# Patient Record
Sex: MALE | Race: BLACK OR AFRICAN AMERICAN | NOT HISPANIC OR LATINO | Employment: FULL TIME | URBAN - METROPOLITAN AREA
[De-identification: names, ages, dates, MRNs, and addresses within clinical notes are randomized per-mention and may not be internally consistent; named-entity substitution may affect disease eponyms.]

---

## 2017-01-18 ENCOUNTER — TELEPHONE (OUTPATIENT)
Dept: SMOKING CESSATION | Facility: CLINIC | Age: 42
End: 2017-01-18

## 2017-01-18 NOTE — TELEPHONE ENCOUNTER
First attempt regarding smoking cessation quit 1 episode, unable to leave message due to no answering service available.

## 2017-01-25 ENCOUNTER — TELEPHONE (OUTPATIENT)
Dept: SMOKING CESSATION | Facility: CLINIC | Age: 42
End: 2017-01-25

## 2017-01-25 ENCOUNTER — CLINICAL SUPPORT (OUTPATIENT)
Dept: SMOKING CESSATION | Facility: CLINIC | Age: 42
End: 2017-01-25
Payer: COMMERCIAL

## 2017-01-25 DIAGNOSIS — F17.200 NICOTINE DEPENDENCE: Primary | ICD-10-CM

## 2017-01-25 PROCEDURE — 99407 BEHAV CHNG SMOKING > 10 MIN: CPT | Mod: S$GLB,,,

## 2017-01-25 NOTE — TELEPHONE ENCOUNTER
First attempt regarding smoking cessation quit 1 episode, pt stated he was busy and couldn't talk. He requested for me to call him back in a few hours, will call back at a later time.

## 2017-01-27 ENCOUNTER — CLINICAL SUPPORT (OUTPATIENT)
Dept: SMOKING CESSATION | Facility: CLINIC | Age: 42
End: 2017-01-27
Payer: COMMERCIAL

## 2017-01-27 DIAGNOSIS — F17.200 NICOTINE DEPENDENCE: Primary | ICD-10-CM

## 2017-01-27 PROCEDURE — 99404 PREV MED CNSL INDIV APPRX 60: CPT | Mod: S$GLB,,,

## 2017-01-27 PROCEDURE — 99999 PR PBB SHADOW E&M-EST. PATIENT-LVL I: CPT | Mod: PBBFAC,,,

## 2017-01-27 RX ORDER — NICOTINE 7MG/24HR
1 PATCH, TRANSDERMAL 24 HOURS TRANSDERMAL DAILY
Qty: 14 PATCH | Refills: 0 | Status: SHIPPED | OUTPATIENT
Start: 2017-01-27 | End: 2018-03-08

## 2017-01-27 RX ORDER — VARENICLINE TARTRATE 0.5 (11)-1
KIT ORAL
Qty: 1 PACKAGE | Refills: 0 | Status: SHIPPED | OUTPATIENT
Start: 2017-01-27 | End: 2018-03-08

## 2017-01-27 NOTE — PROGRESS NOTES
Pt was seen today for smoking cessation quit #2. Pt is smoking about 4-5 cigs/day and is using a e-cigarette with 1.8 % nicotine 2-3 times per day. Pt is confident and ready to get quit. He was able to quit last year when he went through program for 4 months, but relapsed because of stress. Patient will be participating in biweekly tobacco cessation meetings and will begin the prescribed tobacco cessation medication regimen of the Chantix starter pack and 7 mg nicotine patches QAM. He used both prior and didn't experience any negative side effects. Pt asked to cut back to 2-3 cigs/day and try to eliminate using e-cig. Pt is to start  with rate reduction and wait time of 15 min prior to smoking. Pt is to follow up in group session on 1/31/17 with Fritz Vines.

## 2017-02-16 ENCOUNTER — CLINICAL SUPPORT (OUTPATIENT)
Dept: SMOKING CESSATION | Facility: CLINIC | Age: 42
End: 2017-02-16
Payer: COMMERCIAL

## 2017-02-16 DIAGNOSIS — F17.200 NICOTINE DEPENDENCE: Primary | ICD-10-CM

## 2017-02-16 PROCEDURE — 99407 BEHAV CHNG SMOKING > 10 MIN: CPT | Mod: S$GLB,,,

## 2017-03-07 ENCOUNTER — OFFICE VISIT (OUTPATIENT)
Dept: FAMILY MEDICINE | Facility: CLINIC | Age: 42
End: 2017-03-07
Payer: COMMERCIAL

## 2017-03-07 VITALS
BODY MASS INDEX: 41.75 KG/M2 | HEART RATE: 94 BPM | WEIGHT: 315 LBS | OXYGEN SATURATION: 96 % | HEIGHT: 73 IN | DIASTOLIC BLOOD PRESSURE: 84 MMHG | SYSTOLIC BLOOD PRESSURE: 122 MMHG

## 2017-03-07 DIAGNOSIS — G89.4 CHRONIC PAIN SYNDROME: ICD-10-CM

## 2017-03-07 DIAGNOSIS — Z12.5 SCREENING FOR PROSTATE CANCER: ICD-10-CM

## 2017-03-07 DIAGNOSIS — I10 ESSENTIAL HYPERTENSION: ICD-10-CM

## 2017-03-07 DIAGNOSIS — E66.01 SEVERE OBESITY (BMI >= 40): ICD-10-CM

## 2017-03-07 DIAGNOSIS — Z00.00 ANNUAL PHYSICAL EXAM: Primary | ICD-10-CM

## 2017-03-07 PROCEDURE — 3074F SYST BP LT 130 MM HG: CPT | Mod: S$GLB,,, | Performed by: FAMILY MEDICINE

## 2017-03-07 PROCEDURE — 99396 PREV VISIT EST AGE 40-64: CPT | Mod: S$GLB,,, | Performed by: FAMILY MEDICINE

## 2017-03-07 PROCEDURE — 3079F DIAST BP 80-89 MM HG: CPT | Mod: S$GLB,,, | Performed by: FAMILY MEDICINE

## 2017-03-07 PROCEDURE — 99999 PR PBB SHADOW E&M-EST. PATIENT-LVL III: CPT | Mod: PBBFAC,,, | Performed by: FAMILY MEDICINE

## 2017-03-07 NOTE — PROGRESS NOTES
Subjective:       Patient ID: Shane Savage is a 41 y.o. male.    Chief Complaint: Annual Exam    HPI Comments: 41 years old male who came to the clinic for his physical examination.  Blood pressure today is stable.  No chest pain palpitations or PND.  Patient is obese with a BMI of 44 currently trying to lose weight.  Patient with chronic back pain requesting a second opinion with other pain management group.    Review of Systems   Constitutional: Negative.    HENT: Negative.    Eyes: Negative.    Respiratory: Negative.    Cardiovascular: Negative.  Negative for chest pain, palpitations and leg swelling.   Gastrointestinal: Negative.    Genitourinary: Negative.    Musculoskeletal: Positive for arthralgias.   Skin: Negative.    Neurological: Negative.    Psychiatric/Behavioral: Negative.        Objective:      Physical Exam   Constitutional: He is oriented to person, place, and time. He appears well-developed and well-nourished. No distress.   HENT:   Head: Normocephalic and atraumatic.   Right Ear: External ear normal.   Left Ear: External ear normal.   Nose: Nose normal.   Mouth/Throat: Oropharynx is clear and moist. No oropharyngeal exudate.   Eyes: Conjunctivae and EOM are normal. Pupils are equal, round, and reactive to light. Right eye exhibits no discharge. Left eye exhibits no discharge. No scleral icterus.   Neck: Normal range of motion. Neck supple. No JVD present. No tracheal deviation present. No thyromegaly present.   Cardiovascular: Normal rate, regular rhythm, normal heart sounds and intact distal pulses.  Exam reveals no gallop and no friction rub.    No murmur heard.  Pulmonary/Chest: Effort normal and breath sounds normal. No stridor. No respiratory distress. He has no wheezes. He has no rales. He exhibits no tenderness.   Abdominal: Soft. Bowel sounds are normal. He exhibits no distension and no mass. There is no tenderness. There is no rebound and no guarding.   Musculoskeletal: Normal range of  motion. He exhibits no edema or tenderness.   Lymphadenopathy:     He has no cervical adenopathy.   Neurological: He is alert and oriented to person, place, and time. He has normal reflexes. He displays normal reflexes. No cranial nerve deficit. He exhibits normal muscle tone. Coordination normal.   Skin: Skin is warm and dry. No rash noted. He is not diaphoretic. No erythema. No pallor.   Psychiatric: He has a normal mood and affect. His behavior is normal. Judgment and thought content normal.   Nursing note and vitals reviewed.      Assessment:       1. Annual physical exam    2. Essential hypertension    3. Severe obesity (BMI >= 40)    4. Chronic pain syndrome    5. Screening for prostate cancer        Plan:         Shane was seen today for annual exam.    Diagnoses and all orders for this visit:    Annual physical exam  -     Comprehensive metabolic panel; Future  -     Lipid panel; Future  -     Urinalysis; Future  -     TSH; Future  -     CBC auto differential; Future    Essential hypertension  -     Comprehensive metabolic panel; Future  -     Lipid panel; Future  -     Urinalysis; Future  -     TSH; Future  -     CBC auto differential; Future    Severe obesity (BMI >= 40)  -     Comprehensive metabolic panel; Future  -     Lipid panel; Future    Chronic pain syndrome  -     Ambulatory referral to Pain Clinic    Screening for prostate cancer  -     PSA, Screening; Future    Continue monitoring blood pressure at home, low sodium diet.

## 2017-03-07 NOTE — MR AVS SNAPSHOT
[facility]   [place]  [place] [address]  Phone: [phone]  Fax: [phone]                  Shane Savage   3/7/2017 1:40 PM   Office Visit    Description:  Male : 1975   Provider:  Nuno White MD   Department:  [facility]           Reason for Visit     Annual Exam           Diagnoses this Visit        Comments    Annual physical exam    -  Primary     Essential hypertension         Severe obesity (BMI >= 40)         Chronic pain syndrome         Screening for prostate cancer                To Do List           Future Appointments        Provider Department Dept Phone    3/14/2017 7:00 AM [facility] [phone]    3/14/2017 7:15 AM [facility] [phone]      Goals (5 Years of Data)     None      Follow-Up and Disposition     Return in about 6 months (around 2017), or if symptoms worsen or fail to improve.    Follow-up and Disposition History      [facility]     [facility] Care [facility] -  Assistance  Registered nurses in the [facility] provide clinical advisement, health education, appointment booking, and other advisory services.  Call for this free service at [phone].             Medications           Message regarding Medications     Verify the changes and/or additions to your medication regime listed below are the same as discussed with your clinician today.  If any of these changes or additions are incorrect, please notify your healthcare provider.        STOP taking these medications     acyclovir (ZOVIRAX) 400 MG tablet TK ONE T PO FIVE TIMES DAILY FOR 7 DAYS    CYCLOBENZAPRINE HCL (FLEXERIL ORAL) Take by mouth every evening.    naproxen (NAPROSYN) 500 MG tablet Take 1 tablet (500 mg total) by mouth every 12 (twelve) hours as needed (pain). Take directly after meals.    valacyclovir (VALTREX) 1000 MG tablet Take 1 tablet (1,000 mg  "total) by mouth 3 (three) times daily.           Verify that the below list of medications is an accurate representation of the medications you are currently taking.  If none reported, the list may be blank. If incorrect, please contact your healthcare provider. Carry this list with you in case of emergency.           Current Medications     hydrochlorothiazide (HYDRODIURIL) 12.5 MG Tab TAKE ONE TABLET BY MOUTH ONCE DAILY    hydrocodone-acetaminophen 7.5-325mg (NORCO) 7.5-325 mg per tablet Take 1 tablet by mouth every 6 (six) hours as needed for Pain.    nicotine (NICODERM CQ) 7 mg/24 hr Place 1 patch onto the skin once daily.    CHANTIX STARTING MONTH BOX 0.5 mg (11)- 1 mg (42) tablet Take one 0.5mg tab by mouth once daily X3 days,then increase to one 0.5mg tab twice daily X4 days,then increase to one 1mg tab twice daily           Clinical Reference Information           Your Vitals Were     BP Pulse Height Weight SpO2 BMI    122/84 (BP Location: Left arm, Patient Position: Sitting, BP Method: Manual) 94 6' 1" (1.854 m) 152.8 kg (336 lb 13.8 oz) 96% 44.44 kg/m2      Blood Pressure          Most Recent Value    BP  122/84      Allergies as of 3/7/2017     Shellfish Containing Products      Immunizations Administered on Date of Encounter - 3/7/2017     None      Orders Placed During Today's Visit      Normal Orders This Visit    Ambulatory referral to Pain Clinic     Future Labs/Procedures Expected by Expires    CBC auto differential  3/7/2017 3/7/2018    Comprehensive metabolic panel  3/7/2017 6/5/2017    Lipid panel  3/7/2017 6/5/2017    PSA, Screening  3/7/2017 6/5/2017    TSH  3/7/2017 6/5/2017    Urinalysis  3/7/2017 6/5/2017      Instructions      Chronic Pain  Pain serves an important role. It lets you know something is wrong that needs your attention. When the body heals, pain normally goes away.  When pain lasts longer than six months, it is called chronic pain. This is pain that is present even after the " body has healed. Chronic pain can cause mood problems and get in the way of your relationships and your daily life.  A number of conditions can cause chronic pain. Some of the more common include:  · Previous surgery  · An old injury  · Infection  · Diseases such as diabetes  · Nerve damage  · Back injury  · Arthritis  · Migraine or other headaches  · Fibromyalgia  · Cancer  Depression and stress can make chronic pain symptoms worse. In some cases, a cause for the pain cannot be found.   Treatment  Treatment can greatly reduce pain. In many cases, pain can become less severe, occur less often, and interfere less with your daily life. Chronic pain is often treated with a combination of medicines, therapies, and lifestyle changes. You will work closely with your healthcare provider to find a treatment plan that works best for you.  · Ask your healthcare provider for a referral to a pain management specialty center. These can provide the most recent and proven pain management strategies, along with emotional support and comprehensive services.  · Several different types of medicines may be prescribed for chronic pain. Work with your healthcare provider to develop a medicine plan that helps manage your pain.  · Physical therapy can be very effective in helping reduce certain types of chronic pain.  · Occupational therapy teaches you how to do routine tasks of daily living in ways that lessen your discomfort.  · Psychological therapy can help you cope better with stress and pain.  · Other therapies such as meditation, yoga, biofeedback, massage, and acupuncture can also help manage chronic pain.  · Changing certain habits can help reduce chronic pain. They include:  ¨ Eating healthy  ¨ Developing an exercise routine  ¨ Getting enough sleep at night  ¨ Stopping smoking and limiting alcohol use  ¨ Losing excess weight  Follow-up care  Follow up with your healthcare provider as advised. Let your healthcare provider know if  your current treatment plan is working or if changes are needed.  Resources  For more information, contact:  · American [place] for Headache Society www.achenet.org  · American Chronic Pain Association www.theacpa.org [phone]  Date Last Reviewed: 7/26/2015  © 9218-4604 [employer]. [address] [place] [address]. All rights reserved. This information is not intended as a substitute for professional medical care. Always follow your healthcare professional's instructions.             Smoking Cessation     If you would like to quit smoking:   You may be eligible for free services if you are a [place] resident and started smoking cigarettes before September 1, 1988.  Call the Smoking Cessation Trust (SCT) toll free at (958) 255-9427 or (187) 126-1363.   Call 9-900-QUIT-NOW if you do not meet the above criteria.            Language Assistance Services     ATTENTION: Language assistance services are available, free of charge. Please call [phone].      ATENCIÓN: Si habla kuldip, tiene a villarreal disposición servicios gratuitos de asistencia lingüística. Llame al [phone].     LAKISHA Ý: N?u b?n nói Ti?ng Vi?t, có các d?ch v? h? tr? ngôn ng? mi?n phí dành cho b?n. G?i s? [phone].         [facility] complies with applicable Federal civil rights laws and does not discriminate on the basis of race, color, national origin, age, disability, or sex.

## 2017-03-07 NOTE — PATIENT INSTRUCTIONS
Chronic Pain  Pain serves an important role. It lets you know something is wrong that needs your attention. When the body heals, pain normally goes away.  When pain lasts longer than six months, it is called chronic pain. This is pain that is present even after the body has healed. Chronic pain can cause mood problems and get in the way of your relationships and your daily life.  A number of conditions can cause chronic pain. Some of the more common include:  · Previous surgery  · An old injury  · Infection  · Diseases such as diabetes  · Nerve damage  · Back injury  · Arthritis  · Migraine or other headaches  · Fibromyalgia  · Cancer  Depression and stress can make chronic pain symptoms worse. In some cases, a cause for the pain cannot be found.   Treatment  Treatment can greatly reduce pain. In many cases, pain can become less severe, occur less often, and interfere less with your daily life. Chronic pain is often treated with a combination of medicines, therapies, and lifestyle changes. You will work closely with your healthcare provider to find a treatment plan that works best for you.  · Ask your healthcare provider for a referral to a pain management specialty center. These can provide the most recent and proven pain management strategies, along with emotional support and comprehensive services.  · Several different types of medicines may be prescribed for chronic pain. Work with your healthcare provider to develop a medicine plan that helps manage your pain.  · Physical therapy can be very effective in helping reduce certain types of chronic pain.  · Occupational therapy teaches you how to do routine tasks of daily living in ways that lessen your discomfort.  · Psychological therapy can help you cope better with stress and pain.  · Other therapies such as meditation, yoga, biofeedback, massage, and acupuncture can also help manage chronic pain.  · Changing certain habits can help reduce chronic pain. They  include:  ¨ Eating healthy  ¨ Developing an exercise routine  ¨ Getting enough sleep at night  ¨ Stopping smoking and limiting alcohol use  ¨ Losing excess weight  Follow-up care  Follow up with your healthcare provider as advised. Let your healthcare provider know if your current treatment plan is working or if changes are needed.  Resources  For more information, contact:  · American [place] for Headache Society www.achenet.org  · American Chronic Pain Association www.theacpa.org [phone]  Date Last Reviewed: 7/26/2015 © 2000-2016 [employer]. [address] [place] [address]. All rights reserved. This information is not intended as a substitute for professional medical care. Always follow your healthcare professional's instructions.

## 2017-03-14 ENCOUNTER — LAB VISIT (OUTPATIENT)
Dept: LAB | Facility: HOSPITAL | Age: 42
End: 2017-03-14
Attending: FAMILY MEDICINE
Payer: COMMERCIAL

## 2017-03-14 DIAGNOSIS — E66.01 SEVERE OBESITY (BMI >= 40): ICD-10-CM

## 2017-03-14 DIAGNOSIS — I10 ESSENTIAL HYPERTENSION: ICD-10-CM

## 2017-03-14 DIAGNOSIS — Z00.00 ANNUAL PHYSICAL EXAM: ICD-10-CM

## 2017-03-14 DIAGNOSIS — Z12.5 SCREENING FOR PROSTATE CANCER: ICD-10-CM

## 2017-03-14 LAB
ALBUMIN SERPL BCP-MCNC: 3.3 G/DL
ALP SERPL-CCNC: 51 U/L
ALT SERPL W/O P-5'-P-CCNC: 20 U/L
ANION GAP SERPL CALC-SCNC: 7 MMOL/L
AST SERPL-CCNC: 29 U/L
BASOPHILS # BLD AUTO: 0.01 K/UL
BASOPHILS NFR BLD: 0.2 %
BILIRUB SERPL-MCNC: 0.4 MG/DL
BUN SERPL-MCNC: 9 MG/DL
CALCIUM SERPL-MCNC: 9.3 MG/DL
CHLORIDE SERPL-SCNC: 106 MMOL/L
CHOLEST/HDLC SERPL: 3.6 {RATIO}
CO2 SERPL-SCNC: 27 MMOL/L
COMPLEXED PSA SERPL-MCNC: 0.39 NG/ML
CREAT SERPL-MCNC: 1 MG/DL
DIFFERENTIAL METHOD: ABNORMAL
EOSINOPHIL # BLD AUTO: 0.2 K/UL
EOSINOPHIL NFR BLD: 3.2 %
ERYTHROCYTE [DISTWIDTH] IN BLOOD BY AUTOMATED COUNT: 13.2 %
EST. GFR  (AFRICAN AMERICAN): >60 ML/MIN/1.73 M^2
EST. GFR  (NON AFRICAN AMERICAN): >60 ML/MIN/1.73 M^2
GLUCOSE SERPL-MCNC: 91 MG/DL
HCT VFR BLD AUTO: 39.5 %
HDL/CHOLESTEROL RATIO: 27.8 %
HDLC SERPL-MCNC: 133 MG/DL
HDLC SERPL-MCNC: 37 MG/DL
HGB BLD-MCNC: 13 G/DL
LDLC SERPL CALC-MCNC: 72.4 MG/DL
LYMPHOCYTES # BLD AUTO: 2.9 K/UL
LYMPHOCYTES NFR BLD: 57.4 %
MCH RBC QN AUTO: 31.1 PG
MCHC RBC AUTO-ENTMCNC: 32.9 %
MCV RBC AUTO: 95 FL
MONOCYTES # BLD AUTO: 0.4 K/UL
MONOCYTES NFR BLD: 8 %
NEUTROPHILS # BLD AUTO: 1.6 K/UL
NEUTROPHILS NFR BLD: 31 %
NONHDLC SERPL-MCNC: 96 MG/DL
PLATELET # BLD AUTO: 272 K/UL
PMV BLD AUTO: 10.4 FL
POTASSIUM SERPL-SCNC: 4.2 MMOL/L
PROT SERPL-MCNC: 6.8 G/DL
RBC # BLD AUTO: 4.18 M/UL
SODIUM SERPL-SCNC: 140 MMOL/L
TRIGL SERPL-MCNC: 118 MG/DL
TSH SERPL DL<=0.005 MIU/L-ACNC: 1.03 UIU/ML
WBC # BLD AUTO: 5.02 K/UL

## 2017-03-14 PROCEDURE — 80053 COMPREHEN METABOLIC PANEL: CPT

## 2017-03-14 PROCEDURE — 80061 LIPID PANEL: CPT

## 2017-03-14 PROCEDURE — 84153 ASSAY OF PSA TOTAL: CPT

## 2017-03-14 PROCEDURE — 36415 COLL VENOUS BLD VENIPUNCTURE: CPT | Mod: PO

## 2017-03-14 PROCEDURE — 85025 COMPLETE CBC W/AUTO DIFF WBC: CPT

## 2017-03-14 PROCEDURE — 84443 ASSAY THYROID STIM HORMONE: CPT

## 2017-04-13 RX ORDER — HYDROCHLOROTHIAZIDE 12.5 MG/1
TABLET ORAL
Qty: 90 TABLET | Refills: 0 | Status: SHIPPED | OUTPATIENT
Start: 2017-04-13 | End: 2017-08-10 | Stop reason: SDUPTHER

## 2017-04-25 ENCOUNTER — HOSPITAL ENCOUNTER (OUTPATIENT)
Dept: RADIOLOGY | Facility: HOSPITAL | Age: 42
Discharge: HOME | End: 2017-04-25
Attending: NURSE PRACTITIONER
Payer: COMMERCIAL

## 2017-04-25 ENCOUNTER — OFFICE VISIT (OUTPATIENT)
Dept: FAMILY MEDICINE | Facility: CLINIC | Age: 42
End: 2017-04-25
Payer: COMMERCIAL

## 2017-04-25 VITALS
BODY MASS INDEX: 41.75 KG/M2 | WEIGHT: 315 LBS | DIASTOLIC BLOOD PRESSURE: 86 MMHG | HEART RATE: 88 BPM | SYSTOLIC BLOOD PRESSURE: 118 MMHG | HEIGHT: 73 IN

## 2017-04-25 DIAGNOSIS — R10.31 RIGHT LOWER QUADRANT ABDOMINAL PAIN: ICD-10-CM

## 2017-04-25 DIAGNOSIS — R10.31 RIGHT LOWER QUADRANT ABDOMINAL PAIN: Primary | ICD-10-CM

## 2017-04-25 DIAGNOSIS — R19.7 DIARRHEA, UNSPECIFIED TYPE: ICD-10-CM

## 2017-04-25 PROCEDURE — 3074F SYST BP LT 130 MM HG: CPT | Mod: S$GLB,,, | Performed by: NURSE PRACTITIONER

## 2017-04-25 PROCEDURE — 74000 XR ABDOMEN AP 1 VIEW: CPT | Mod: TC

## 2017-04-25 PROCEDURE — 3079F DIAST BP 80-89 MM HG: CPT | Mod: S$GLB,,, | Performed by: NURSE PRACTITIONER

## 2017-04-25 PROCEDURE — 74000 XR ABDOMEN AP 1 VIEW: CPT | Mod: 26,,, | Performed by: RADIOLOGY

## 2017-04-25 PROCEDURE — 99999 PR PBB SHADOW E&M-EST. PATIENT-LVL III: CPT | Mod: PBBFAC,,, | Performed by: NURSE PRACTITIONER

## 2017-04-25 PROCEDURE — 99213 OFFICE O/P EST LOW 20 MIN: CPT | Mod: S$GLB,,, | Performed by: NURSE PRACTITIONER

## 2017-04-25 PROCEDURE — 1160F RVW MEDS BY RX/DR IN RCRD: CPT | Mod: S$GLB,,, | Performed by: NURSE PRACTITIONER

## 2017-04-25 RX ORDER — TRAMADOL HYDROCHLORIDE 50 MG/1
TABLET ORAL
COMMUNITY
Start: 2017-03-28 | End: 2018-03-08

## 2017-04-25 RX ORDER — CIPROFLOXACIN 500 MG/1
500 TABLET ORAL EVERY 12 HOURS
Qty: 20 TABLET | Refills: 0 | Status: SHIPPED | OUTPATIENT
Start: 2017-04-25 | End: 2017-05-05

## 2017-04-25 RX ORDER — DICYCLOMINE HYDROCHLORIDE 20 MG/1
20 TABLET ORAL EVERY 6 HOURS PRN
Qty: 30 TABLET | Refills: 0 | Status: SHIPPED | OUTPATIENT
Start: 2017-04-25 | End: 2017-05-25

## 2017-04-25 NOTE — PROGRESS NOTES
Subjective:       Patient ID: Shane Savage is a 41 y.o. male.    Chief Complaint: Abdominal Pain    Abdominal Pain   This is a new problem. The current episode started in the past 7 days (3 days ago). The onset quality is sudden. The problem occurs constantly. The problem has been unchanged. The pain is located in the LLQ and RLQ. The pain is at a severity of 3/10 (pain is a 10 when it is exacerbated). The pain is mild. The quality of the pain is aching and sharp. The abdominal pain does not radiate. Associated symptoms include diarrhea and flatus. His past medical history is significant for abdominal surgery (gastric bypass sx).   Diarrhea    This is a new problem. The current episode started in the past 7 days (3 days ago). The problem occurs 2 to 4 times per day. The problem has been unchanged. Associated symptoms include abdominal pain, coughing and increased flatus. Risk factors include suspect food intake. Treatments tried: gas-x. The treatment provided no relief.     Review of Systems   Respiratory: Positive for cough.    Gastrointestinal: Positive for abdominal pain, diarrhea and flatus.       Objective:      Physical Exam   Constitutional: He is oriented to person, place, and time. He appears well-developed. No distress.   Morbidly obese   HENT:   Head: Normocephalic and atraumatic.   Eyes: EOM are normal. Pupils are equal, round, and reactive to light.   Neck: Neck supple. No JVD present. No tracheal deviation present.   Cardiovascular: Normal rate, regular rhythm, normal heart sounds and intact distal pulses.    No murmur heard.  Pulmonary/Chest: Effort normal and breath sounds normal. No respiratory distress. He has no wheezes. He has no rales.   Abdominal: Soft. Normal appearance. He exhibits no distension and no mass. Bowel sounds are increased. There is no hepatosplenomegaly. There is tenderness in the right lower quadrant. There is no rebound and no CVA tenderness.   Musculoskeletal: Normal range  of motion. He exhibits no edema or tenderness.   Neurological: He is alert and oriented to person, place, and time. Coordination normal.   Skin: Skin is warm and dry. No erythema. No pallor.   Psychiatric: He has a normal mood and affect. His behavior is normal. Judgment and thought content normal. Cognition and memory are normal. He expresses no homicidal and no suicidal ideation.   Nursing note and vitals reviewed.      Assessment:       1. Right lower quadrant abdominal pain    2. Diarrhea, unspecified type        Plan:     Shane was seen today for abdominal pain.    Diagnoses and all orders for this visit:    Right lower quadrant abdominal pain  -     X-Ray Abdomen AP 1 View; Future  -     dicyclomine (BENTYL) 20 mg tablet; Take 1 tablet (20 mg total) by mouth every 6 (six) hours as needed.    Diarrhea, unspecified type  -     ciprofloxacin HCl (CIPRO) 500 MG tablet; Take 1 tablet (500 mg total) by mouth every 12 (twelve) hours.    Follow-up with PCP if symptoms worsen or fail to improve.

## 2017-04-25 NOTE — MR AVS SNAPSHOT
[facility]   [place]  [place] [address]  Phone: [phone]  Fax: [phone]                  Shane Savage   2017 3:00 PM   Office Visit    Description:  Male : 1975   Provider:  Brittney Polk NP   Department:  [facility]           Reason for Visit     Abdominal Pain           Diagnoses this Visit        Comments    Right lower quadrant abdominal pain    -  Primary     Diarrhea, unspecified type                To Do List           Future Appointments        Provider Department Dept Phone    2017 3:45 PM [facility] XR1 300 LB LIMIT [facility] [phone]      Goals (5 Years of Data)     None      Follow-Up and Disposition     Return if symptoms worsen or fail to improve.       These Medications        Disp Refills Start End    ciprofloxacin HCl (CIPRO) 500 MG tablet 20 tablet 0 2017    Take 1 tablet (500 mg total) by mouth every 12 (twelve) hours. - Oral    Pharmacy: [facility] [place] [address] Ph #: [phone]       dicyclomine (BENTYL) 20 mg tablet 30 tablet 0 2017    Take 1 tablet (20 mg total) by mouth every 6 (six) hours as needed. - Oral    Pharmacy: [address] [place] [address] Ph #: [phone]         [facility]     [facility] Nurse Care Line - 24/7 Assistance  Unless otherwise directed by your provider, please contact [facility], our nurse care line that is available for 24/7 assistance.     Registered nurses in the [facility] provide: appointment scheduling, clinical advisement, health education, and other advisory services.  Call: [phone] (toll free)               Medications           Message regarding Medications     Verify the changes and/or additions to your medication regime listed below are the same as discussed with your clinician today.  If any of these changes or additions are  "incorrect, please notify your healthcare provider.        START taking these NEW medications        Refills    ciprofloxacin HCl (CIPRO) 500 MG tablet 0    Sig: Take 1 tablet (500 mg total) by mouth every 12 (twelve) hours.    Class: Normal    Route: Oral    dicyclomine (BENTYL) 20 mg tablet 0    Sig: Take 1 tablet (20 mg total) by mouth every 6 (six) hours as needed.    Class: Normal    Route: Oral           Verify that the below list of medications is an accurate representation of the medications you are currently taking.  If none reported, the list may be blank. If incorrect, please contact your healthcare provider. Carry this list with you in case of emergency.           Current Medications     CHANTIX STARTING MONTH BOX 0.5 mg (11)- 1 mg (42) tablet Take one 0.5mg tab by mouth once daily X3 days,then increase to one 0.5mg tab twice daily X4 days,then increase to one 1mg tab twice daily    hydrochlorothiazide (HYDRODIURIL) 12.5 MG Tab TAKE ONE TABLET BY MOUTH ONCE DAILY    hydrocodone-acetaminophen 7.5-325mg (NORCO) 7.5-325 mg per tablet Take 1 tablet by mouth every 6 (six) hours as needed for Pain.    nicotine (NICODERM CQ) 7 mg/24 hr Place 1 patch onto the skin once daily.    tramadol (ULTRAM) 50 mg tablet     ciprofloxacin HCl (CIPRO) 500 MG tablet Take 1 tablet (500 mg total) by mouth every 12 (twelve) hours.    dicyclomine (BENTYL) 20 mg tablet Take 1 tablet (20 mg total) by mouth every 6 (six) hours as needed.           Clinical Reference Information           Your Vitals Were     BP Pulse Height Weight BMI    118/86 88 6' 1" (1.854 m) 152.4 kg (335 lb 15.7 oz) 44.33 kg/m2      Blood Pressure          Most Recent Value    BP  118/86      Allergies as of 4/25/2017     Shellfish Containing Products      Immunizations Administered on Date of Encounter - 4/25/2017     None      Orders Placed During Today's Visit     Future Labs/Procedures Expected by Expires    X-Ray Abdomen AP 1 View  4/25/2017 4/25/2018    "   [facility] Sign-Up     Activating your [facility] account is as easy as 1-2-3!     1) Visit [email or web address], select Sign Up Now, enter this activation code and your date of birth, then select Next.  Activation code not generated  Current Patient Portal Status: Account disabled      2) Create a username and password to use when you visit [facility] in the future and select a security question in case you lose your password and select Next.    3) Enter your e-mail address and click Sign Up!    Additional Information  If you have questions, please e-mail [email or web address] or call [phone] to talk to our [facility] staff. Remember, [facility] is NOT to be used for urgent needs. For medical emergencies, dial 911.         Smoking Cessation     If you would like to quit smoking:   You may be eligible for free services if you are a [place] resident and started smoking cigarettes before September 1, 1988.  Call the Smoking Cessation Trust ([facility]) toll free at (140) 811-7565 or (316) 188-5187.   Call 9-739-QUIT-NOW if you do not meet the above criteria.   Contact us via email: [email or web address]   View our website for more information: [email or web address]        Language Assistance Services     ATTENTION: Language assistance services are available, free of charge. Please call [phone].      ATENCIÓN: Si habla español, tiene a villarreal disposición servicios gratuitos de asistencia lingüística. Llame al [phone].     CHÚ Ý: N?u b?n nói Ti?ng Vi?t, có các d?ch v? h? tr? ngôn ng? mi?n phí dành cho b?n. G?i s? [phone].         [facility] complies with applicable Federal civil rights laws and does not discriminate on the basis of race, color, national origin, age, disability, or sex.

## 2017-05-05 ENCOUNTER — TELEPHONE (OUTPATIENT)
Dept: SMOKING CESSATION | Facility: CLINIC | Age: 42
End: 2017-05-05

## 2017-07-11 ENCOUNTER — TELEPHONE (OUTPATIENT)
Dept: SMOKING CESSATION | Facility: CLINIC | Age: 42
End: 2017-07-11

## 2017-07-12 ENCOUNTER — TELEPHONE (OUTPATIENT)
Dept: SMOKING CESSATION | Facility: CLINIC | Age: 42
End: 2017-07-12

## 2017-07-18 ENCOUNTER — TELEPHONE (OUTPATIENT)
Dept: SMOKING CESSATION | Facility: CLINIC | Age: 42
End: 2017-07-18

## 2017-08-10 RX ORDER — HYDROCHLOROTHIAZIDE 12.5 MG/1
TABLET ORAL
Qty: 90 TABLET | Refills: 0 | Status: SHIPPED | OUTPATIENT
Start: 2017-08-10 | End: 2018-05-07 | Stop reason: SDUPTHER

## 2017-08-17 ENCOUNTER — TELEPHONE (OUTPATIENT)
Dept: FAMILY MEDICINE | Facility: CLINIC | Age: 42
End: 2017-08-17

## 2017-08-17 NOTE — TELEPHONE ENCOUNTER
----- Message from Molly Sloan sent at 8/17/2017  1:18 PM CDT -----  Contact:  Judith from [facility] [phone]   Pharmacy would like to speak with you about changing hydrochlorothiazide (HYDRODIURIL) 12.5 MG Tab to capsule. Please advise.

## 2017-12-20 RX ORDER — HYDROCHLOROTHIAZIDE 12.5 MG/1
CAPSULE ORAL
Qty: 90 CAPSULE | Refills: 0 | Status: SHIPPED | OUTPATIENT
Start: 2017-12-20 | End: 2018-03-08

## 2017-12-21 NOTE — TELEPHONE ENCOUNTER
----- Message from Carolyn Ko sent at 12/21/2017  2:28 PM CST -----  No. [phone]    [facility] on [address] requested refill on Hydrochlorothiazide.   Please authorize.

## 2018-01-10 ENCOUNTER — TELEPHONE (OUTPATIENT)
Dept: SMOKING CESSATION | Facility: CLINIC | Age: 43
End: 2018-01-10

## 2018-01-11 ENCOUNTER — TELEPHONE (OUTPATIENT)
Dept: SMOKING CESSATION | Facility: CLINIC | Age: 43
End: 2018-01-11

## 2018-01-12 ENCOUNTER — TELEPHONE (OUTPATIENT)
Dept: SMOKING CESSATION | Facility: CLINIC | Age: 43
End: 2018-01-12

## 2018-01-12 NOTE — TELEPHONE ENCOUNTER
Third attempt left message regarding smoking cessation quit 2 episode, will resolve episode.

## 2018-02-19 ENCOUNTER — CLINICAL SUPPORT (OUTPATIENT)
Dept: FAMILY MEDICINE | Facility: CLINIC | Age: 43
End: 2018-02-19

## 2018-02-19 DIAGNOSIS — Z00.00 ROUTINE GENERAL MEDICAL EXAMINATION AT A HEALTH CARE FACILITY: Primary | ICD-10-CM

## 2018-02-19 PROCEDURE — 99201 PR OFFICE/OUTPT VISIT,NEW,LEVL I: CPT | Mod: S$GLB,,, | Performed by: FAMILY MEDICINE

## 2018-02-19 NOTE — PROGRESS NOTES
Shane has presented today for Post-Accident screening on behalf of [facility]. Shane Savage has completed Non-DOT Physical. For left shoulder pain    $55    Chasity Kimbrough

## 2018-03-08 ENCOUNTER — LAB VISIT (OUTPATIENT)
Dept: LAB | Facility: HOSPITAL | Age: 43
End: 2018-03-08
Attending: FAMILY MEDICINE
Payer: COMMERCIAL

## 2018-03-08 ENCOUNTER — OFFICE VISIT (OUTPATIENT)
Dept: FAMILY MEDICINE | Facility: CLINIC | Age: 43
End: 2018-03-08
Payer: COMMERCIAL

## 2018-03-08 VITALS
HEART RATE: 60 BPM | BODY MASS INDEX: 41.75 KG/M2 | SYSTOLIC BLOOD PRESSURE: 130 MMHG | DIASTOLIC BLOOD PRESSURE: 94 MMHG | HEIGHT: 73 IN | WEIGHT: 315 LBS

## 2018-03-08 DIAGNOSIS — I10 ESSENTIAL HYPERTENSION: ICD-10-CM

## 2018-03-08 DIAGNOSIS — Z00.00 ROUTINE GENERAL MEDICAL EXAMINATION AT A HEALTH CARE FACILITY: ICD-10-CM

## 2018-03-08 DIAGNOSIS — E66.01 SEVERE OBESITY (BMI >= 40): ICD-10-CM

## 2018-03-08 DIAGNOSIS — Z12.5 SCREENING FOR PROSTATE CANCER: ICD-10-CM

## 2018-03-08 DIAGNOSIS — Z00.00 ROUTINE GENERAL MEDICAL EXAMINATION AT A HEALTH CARE FACILITY: Primary | ICD-10-CM

## 2018-03-08 DIAGNOSIS — F12.90 MARIJUANA USER: ICD-10-CM

## 2018-03-08 LAB
ALBUMIN SERPL BCP-MCNC: 3.7 G/DL
ALP SERPL-CCNC: 46 U/L
ALT SERPL W/O P-5'-P-CCNC: 23 U/L
ANION GAP SERPL CALC-SCNC: 9 MMOL/L
AST SERPL-CCNC: 27 U/L
BASOPHILS # BLD AUTO: 0.02 K/UL
BASOPHILS NFR BLD: 0.4 %
BILIRUB SERPL-MCNC: 0.6 MG/DL
BILIRUB UR QL STRIP: NEGATIVE
BUN SERPL-MCNC: 10 MG/DL
CALCIUM SERPL-MCNC: 9.8 MG/DL
CHLORIDE SERPL-SCNC: 105 MMOL/L
CHOLEST SERPL-MCNC: 139 MG/DL
CHOLEST/HDLC SERPL: 2.7 {RATIO}
CLARITY UR REFRACT.AUTO: CLEAR
CO2 SERPL-SCNC: 25 MMOL/L
COLOR UR AUTO: YELLOW
COMPLEXED PSA SERPL-MCNC: 0.4 NG/ML
CREAT SERPL-MCNC: 1.1 MG/DL
DIFFERENTIAL METHOD: ABNORMAL
EOSINOPHIL # BLD AUTO: 0.1 K/UL
EOSINOPHIL NFR BLD: 1.9 %
ERYTHROCYTE [DISTWIDTH] IN BLOOD BY AUTOMATED COUNT: 13.2 %
EST. GFR  (AFRICAN AMERICAN): >60 ML/MIN/1.73 M^2
EST. GFR  (NON AFRICAN AMERICAN): >60 ML/MIN/1.73 M^2
GLUCOSE SERPL-MCNC: 95 MG/DL
GLUCOSE UR QL STRIP: NEGATIVE
HCT VFR BLD AUTO: 40.7 %
HDLC SERPL-MCNC: 51 MG/DL
HDLC SERPL: 36.7 %
HGB BLD-MCNC: 13.3 G/DL
HGB UR QL STRIP: NEGATIVE
IMM GRANULOCYTES # BLD AUTO: 0.01 K/UL
IMM GRANULOCYTES NFR BLD AUTO: 0.2 %
KETONES UR QL STRIP: NEGATIVE
LDLC SERPL CALC-MCNC: 66.4 MG/DL
LEUKOCYTE ESTERASE UR QL STRIP: NEGATIVE
LYMPHOCYTES # BLD AUTO: 1.9 K/UL
LYMPHOCYTES NFR BLD: 40.2 %
MCH RBC QN AUTO: 31.1 PG
MCHC RBC AUTO-ENTMCNC: 32.7 G/DL
MCV RBC AUTO: 95 FL
MICROSCOPIC COMMENT: NORMAL
MONOCYTES # BLD AUTO: 0.5 K/UL
MONOCYTES NFR BLD: 10.6 %
NEUTROPHILS # BLD AUTO: 2.2 K/UL
NEUTROPHILS NFR BLD: 46.7 %
NITRITE UR QL STRIP: NEGATIVE
NONHDLC SERPL-MCNC: 88 MG/DL
NRBC BLD-RTO: 0 /100 WBC
PH UR STRIP: 5 [PH] (ref 5–8)
PLATELET # BLD AUTO: 259 K/UL
PMV BLD AUTO: 10.7 FL
POTASSIUM SERPL-SCNC: 4.2 MMOL/L
PROT SERPL-MCNC: 6.9 G/DL
PROT UR QL STRIP: NEGATIVE
RBC # BLD AUTO: 4.28 M/UL
RBC #/AREA URNS AUTO: 1 /HPF (ref 0–4)
SODIUM SERPL-SCNC: 139 MMOL/L
SP GR UR STRIP: 1.02 (ref 1–1.03)
TRIGL SERPL-MCNC: 108 MG/DL
TSH SERPL DL<=0.005 MIU/L-ACNC: 0.98 UIU/ML
URN SPEC COLLECT METH UR: NORMAL
UROBILINOGEN UR STRIP-ACNC: NEGATIVE EU/DL
WBC # BLD AUTO: 4.7 K/UL

## 2018-03-08 PROCEDURE — 84443 ASSAY THYROID STIM HORMONE: CPT

## 2018-03-08 PROCEDURE — 36415 COLL VENOUS BLD VENIPUNCTURE: CPT | Mod: PO

## 2018-03-08 PROCEDURE — 81001 URINALYSIS AUTO W/SCOPE: CPT

## 2018-03-08 PROCEDURE — 80053 COMPREHEN METABOLIC PANEL: CPT

## 2018-03-08 PROCEDURE — 99999 PR PBB SHADOW E&M-EST. PATIENT-LVL III: CPT | Mod: PBBFAC,,, | Performed by: FAMILY MEDICINE

## 2018-03-08 PROCEDURE — 85025 COMPLETE CBC W/AUTO DIFF WBC: CPT

## 2018-03-08 PROCEDURE — 99396 PREV VISIT EST AGE 40-64: CPT | Mod: S$GLB,,, | Performed by: FAMILY MEDICINE

## 2018-03-08 PROCEDURE — 84153 ASSAY OF PSA TOTAL: CPT

## 2018-03-08 PROCEDURE — 80061 LIPID PANEL: CPT

## 2018-03-08 NOTE — PROGRESS NOTES
Subjective:       Patient ID: Shane Savage is a 42 y.o. male.    Chief Complaint: Annual Exam    42 years old male came to the clinic for his physical examination.  Patient with a BMI of 45 currently trying to lose weight.  Blood pressure today stable.  No chest pain palpitations orthopnea PND.  Patient is occasional marijuana use for chronic pain.      Review of Systems   Constitutional: Negative.    HENT: Negative.    Eyes: Negative.    Respiratory: Negative.    Cardiovascular: Negative.  Negative for chest pain, palpitations and leg swelling.   Gastrointestinal: Negative.    Genitourinary: Negative.    Musculoskeletal: Positive for back pain.   Skin: Negative.    Neurological: Negative.    Psychiatric/Behavioral: Negative.        Objective:      Physical Exam   Constitutional: He is oriented to person, place, and time. He appears well-developed and well-nourished. No distress.   HENT:   Head: Normocephalic and atraumatic.   Right Ear: External ear normal.   Left Ear: External ear normal.   Nose: Nose normal.   Mouth/Throat: Oropharynx is clear and moist. No oropharyngeal exudate.   Eyes: Conjunctivae and EOM are normal. Pupils are equal, round, and reactive to light. Right eye exhibits no discharge. Left eye exhibits no discharge. No scleral icterus.   Neck: Normal range of motion. Neck supple. No JVD present. No tracheal deviation present. No thyromegaly present.   Cardiovascular: Normal rate, regular rhythm, normal heart sounds and intact distal pulses.  Exam reveals no gallop and no friction rub.    No murmur heard.  Pulmonary/Chest: Effort normal and breath sounds normal. No stridor. No respiratory distress. He has no wheezes. He has no rales. He exhibits no tenderness.   Abdominal: Soft. Bowel sounds are normal. He exhibits no distension and no mass. There is no tenderness. There is no rebound and no guarding.   Musculoskeletal: Normal range of motion. He exhibits no edema or tenderness.    Lymphadenopathy:     He has no cervical adenopathy.   Neurological: He is alert and oriented to person, place, and time. He has normal reflexes. He displays normal reflexes. No cranial nerve deficit. He exhibits normal muscle tone. Coordination normal.   Skin: Skin is warm and dry. No rash noted. He is not diaphoretic. No erythema. No pallor.   Psychiatric: He has a normal mood and affect. His behavior is normal. Judgment and thought content normal.   Nursing note and vitals reviewed.      Assessment:       1. Routine general medical examination at a health care facility    2. Essential hypertension    3. Screening for prostate cancer    4. Severe obesity (BMI >= 40)    5. Marijuana user        Plan:         Shane was seen today for annual exam.    Diagnoses and all orders for this visit:    Routine general medical examination at a health care facility  -     Comprehensive metabolic panel; Future  -     Lipid panel; Future  -     PSA, Screening; Future  -     Cancel: Urinalysis; Future  -     TSH; Future  -     CBC auto differential; Future    Essential hypertension  -     Comprehensive metabolic panel; Future  -     Lipid panel; Future  -     Cancel: Urinalysis; Future  -     TSH; Future  -     CBC auto differential; Future  -     Urinalysis    Screening for prostate cancer  -     PSA, Screening; Future    Severe obesity (BMI >= 40)    Marijuana user    Continue monitoring blood pressure at home, low sodium diet.   Diet and physical activity to promote weight loss.

## 2018-03-08 NOTE — PATIENT INSTRUCTIONS
Prevention Guidelines, Men Ages 40 to 49  Screening tests and vaccines are an important part of managing your health. Health counseling is essential, too. Below are guidelines for these, for men ages 40 to 49. Talk with your healthcare provider to make sure youre up to date on what you need.  Screening Who needs it How often   Alcohol misuse All men in this age group At routine exams   Blood pressure All men in this age group Every 2 years if your blood pressure reading is less than 120/80 mm Hg; yearly if your systolic blood pressure reading is 120 to 139 mm Hg, or your diastolic blood pressure reading is 80 to 89 mm Hg   Depression All men in this age group At routine exams   Type 2 diabetes or prediabetes All adults beginning at age 45 and adults without symptoms at any age who are overweight or obese and have 1 or more other risk factors for diabetes At least every 3 years (yearly if blood sugar has begun to rise)   Hepatitis C Men at increased risk for infection - talk with your healthcare provider At routine exams   High cholesterol or triglycerides All men ages 35 and older, and younger men at high risk for coronary artery disease At least every 5 years   HIV All men At routine exams   Obesity All men in this age group At routine exams   Prostate cancer Starting at age 45, talk to healthcare provider about risks and benefits of digital rectal exam (NICOLAS) and prostate-specific antigen (PSA) screening1 At routine exams   Syphilis Men at increased risk for infection - talk with your healthcare provider At routine exams   Tuberculosis Men at increased risk for infection - talk with your healthcare provider Check with your healthcare provider   Vision All men in this age group Every 2 to 4 years if no risk factors for eye disease2   Vaccine Who needs it How often   Chickenpox (varicella) All men in this age group who have no record of this infection or vaccine 2 doses; the second dose should be given at least 4  weeks after the first dose   Hepatitis A Men at increased risk for infection - talk with your healthcare provider 2 doses given at least 6 months apart   Hepatitis B Men at increased risk for infection - talk with your healthcare provider 3 doses over 6 months; second dose should be given 1 month after the first dose; the third dose should be given at least 2 months after the second dose and at least 4 months after the first dose   Haemophilus influenzae Type B (HIB) Men at increased risk for infection - talk with your healthcare provider 1 to 3 doses   Influenza (flu) All men in this age group Once a year   Measles, mumps, rubella (MMR) All men in this age group who have no record of these infections or vaccines 1 or 2 doses   Meningococcal Men at increased risk for infection - talk with your healthcare provider 1 or more doses   Pneumococcal conjugate vaccine (PCV13) and pneumococcal polysaccharide vaccine (PPSV23) Men at increased risk for infection - talk with your healthcare provider PCV13: 1 dose ages 19 to 65 (protects against 13 types of pneumococcal bacteria)     PPSV23: 1 to 2 doses through age 64, or 1 dose at 65 or older (protects against 23 types of pneumococcal bacteria)      Tetanus/diphtheria/  pertussis (Td/Tdap) booster All men in this age group Td every 10 years, or a one-time dose of Tdap instead of a Td booster after age 18, then Td every 10 years   Counseling Who needs it How often   Diet and exercise Men who are overweight or obese When diagnosed, and then at routine exams   Sexually transmitted infection prevention Men at increased risk for infection - talk with your healthcare provider At routine exams   Use of daily aspirin Men ages 45 to 79 at risk for cardiovascular health problems At routine exams   Use of tobacco and the health effects it can cause All men in this age group Every exam   [address] [place] [address] [facility]   [facility] [employer]  Date Last Reviewed:  2/1/2017  © 7073-1724 [employer] [place]. [address] [place] [address], [place] [address]. All rights reserved. This information is not intended as a substitute for professional medical care. Always follow your healthcare professional's instructions.

## 2018-05-06 ENCOUNTER — HOSPITAL ENCOUNTER (EMERGENCY)
Facility: HOSPITAL | Age: 43
Discharge: HOME | End: 2018-05-06
Attending: EMERGENCY MEDICINE
Payer: COMMERCIAL

## 2018-05-06 VITALS
HEIGHT: 73 IN | HEART RATE: 64 BPM | OXYGEN SATURATION: 96 % | RESPIRATION RATE: 18 BRPM | SYSTOLIC BLOOD PRESSURE: 182 MMHG | BODY MASS INDEX: 41.75 KG/M2 | DIASTOLIC BLOOD PRESSURE: 110 MMHG | TEMPERATURE: 98 F | WEIGHT: 315 LBS

## 2018-05-06 DIAGNOSIS — S61.213A LACERATION OF LEFT MIDDLE FINGER WITHOUT FOREIGN BODY WITHOUT DAMAGE TO NAIL, INITIAL ENCOUNTER: ICD-10-CM

## 2018-05-06 DIAGNOSIS — S67.193A CRUSHING INJURY OF LEFT MIDDLE FINGER, INITIAL ENCOUNTER: Primary | ICD-10-CM

## 2018-05-06 PROCEDURE — 99283 EMERGENCY DEPT VISIT LOW MDM: CPT

## 2018-05-06 PROCEDURE — 63600175 PHARM REV CODE 636 W HCPCS: Performed by: EMERGENCY MEDICINE

## 2018-05-06 PROCEDURE — 90715 TDAP VACCINE 7 YRS/> IM: CPT | Performed by: EMERGENCY MEDICINE

## 2018-05-06 PROCEDURE — 90471 IMMUNIZATION ADMIN: CPT | Performed by: EMERGENCY MEDICINE

## 2018-05-06 PROCEDURE — 25000003 PHARM REV CODE 250: Performed by: EMERGENCY MEDICINE

## 2018-05-06 RX ORDER — HYDROCODONE BITARTRATE AND ACETAMINOPHEN 7.5; 325 MG/1; MG/1
1 TABLET ORAL EVERY 6 HOURS PRN
Qty: 12 TABLET | Refills: 0 | Status: SHIPPED | OUTPATIENT
Start: 2018-05-06 | End: 2018-06-13

## 2018-05-06 RX ORDER — HYDROCODONE BITARTRATE AND ACETAMINOPHEN 10; 325 MG/1; MG/1
2 TABLET ORAL
Status: COMPLETED | OUTPATIENT
Start: 2018-05-06 | End: 2018-05-06

## 2018-05-06 RX ORDER — NAPROXEN 500 MG/1
500 TABLET ORAL 2 TIMES DAILY WITH MEALS
Qty: 30 TABLET | Refills: 0 | Status: SHIPPED | OUTPATIENT
Start: 2018-05-06 | End: 2018-06-13

## 2018-05-06 RX ADMIN — HYDROCODONE BITARTRATE AND ACETAMINOPHEN 2 TABLET: 10; 325 TABLET ORAL at 01:05

## 2018-05-06 RX ADMIN — CLOSTRIDIUM TETANI TOXOID ANTIGEN (FORMALDEHYDE INACTIVATED), CORYNEBACTERIUM DIPHTHERIAE TOXOID ANTIGEN (FORMALDEHYDE INACTIVATED), BORDETELLA PERTUSSIS TOXOID ANTIGEN (GLUTARALDEHYDE INACTIVATED), BORDETELLA PERTUSSIS FILAMENTOUS HEMAGGLUTININ ANTIGEN (FORMALDEHYDE INACTIVATED), BORDETELLA PERTUSSIS PERTACTIN ANTIGEN, AND BORDETELLA PERTUSSIS FIMBRIAE 2/3 ANTIGEN 0.5 ML: 5; 2; 2.5; 5; 3; 5 INJECTION, SUSPENSION INTRAMUSCULAR at 01:05

## 2018-05-06 NOTE — ED PROVIDER NOTES
"Encounter Date: 5/6/2018       History     Chief Complaint   Patient presents with    Finger Injury     Pt states "smashed" left middle finger in machine at work [employer].  Ace wrap and bandage in place on arrival.      42-year-old male was at work when while working with a piece of equipment he had the middle distal left hand finger smashed.  Sustained a traumatic laceration contusion of the distal fingertip.  Patient was wearing his glasses at the time.  No other injuries.  Typically healthy otherwise.  Does several hypertension and has not been taking his blood pressure medicines about 3 or 4 days.  Suspects his blood pressure may be high secondary to pain and medication noncompliance.          Review of patient's allergies indicates:   Allergen Reactions    Shellfish containing products Hives     Past Medical History:   Diagnosis Date    Arthritis     Hypertension      Past Surgical History:   Procedure Laterality Date    gastic bypass       Family History   Problem Relation Age of Onset    Hypertension Mother     Diabetes Mother     Hypertension Father      Social History   Substance Use Topics    Smoking status: Current Some Day Smoker     Packs/day: 0.50     Years: 9.00     Types: Cigarettes    Smokeless tobacco: Former User    Alcohol use Yes     Review of Systems   Constitutional: Negative.    HENT: Negative.    Respiratory: Negative.    Cardiovascular: Negative.    Gastrointestinal: Negative.    Musculoskeletal: Negative.    All other systems reviewed and are negative.      Physical Exam     Initial Vitals [05/06/18 1249]   BP Pulse Resp Temp SpO2   (!) 182/110 64 18 98.1 °F (36.7 °C) 96 %      MAP       134         Physical Exam    Nursing note and vitals reviewed.  Constitutional: He appears well-developed and well-nourished.   HENT:   Head: Normocephalic.   Neck: Normal range of motion.   Cardiovascular: Normal rate and regular rhythm.   Pulmonary/Chest: Breath sounds normal.   Abdominal: Soft. "   Musculoskeletal: Normal range of motion.   Left index distal finger with 1 cm laceration on the distal pad.  Minimal to no bleeding at this time.   Neurological: He is alert and oriented to person, place, and time. He has normal strength.   Skin: Skin is warm. Capillary refill takes less than 2 seconds.   Psychiatric: He has a normal mood and affect. Thought content normal.         ED Course   Procedures  Labs Reviewed - No data to display          Medical Decision Making:   Differential Diagnosis:   Tendon injury, nerve injury, muscle injury, nail injury, foreign body, infection, fracture.  ED Management:  The wound is been close.  The patient stable at this time.  I'll have the patient follow-up with primary care physician in 9 days time for removal of stitches.                      Clinical Impression:   The primary encounter diagnosis was Crushing injury of left middle finger, initial encounter. A diagnosis of Laceration of left middle finger without foreign body without damage to nail, initial encounter was also pertinent to this visit.    Disposition:   Disposition: Discharged  Condition: Stable                        Nikolay Henry MD  05/06/18 5706

## 2018-05-06 NOTE — ED NOTES
Smashed his left middle finger at work finger is sliced open pretty good. Finger was undressed and covered with a wet gauze.

## 2018-05-06 NOTE — DISCHARGE INSTRUCTIONS
Stitches out in 9 days.  Keep finger dry and clean for 2 days.  Keep elevated.  Keep Neosporin on the wound for the first 4 days.  Off work

## 2018-05-07 ENCOUNTER — CLINICAL SUPPORT (OUTPATIENT)
Dept: FAMILY MEDICINE | Facility: CLINIC | Age: 43
End: 2018-05-07

## 2018-05-07 DIAGNOSIS — Z00.00 ROUTINE GENERAL MEDICAL EXAMINATION AT A HEALTH CARE FACILITY: Primary | ICD-10-CM

## 2018-05-07 PROCEDURE — 99201 PR OFFICE/OUTPT VISIT,NEW,LEVL I: CPT | Mod: S$GLB,,, | Performed by: FAMILY MEDICINE

## 2018-05-07 RX ORDER — HYDROCHLOROTHIAZIDE 12.5 MG/1
12.5 TABLET ORAL DAILY
Qty: 90 TABLET | Refills: 0 | Status: SHIPPED | OUTPATIENT
Start: 2018-05-07 | End: 2018-08-24 | Stop reason: SDUPTHER

## 2018-05-07 NOTE — PROGRESS NOTES
Shane has presented today for Post-Accident screening on behalf of [facility] [employer]. Shane Savage has completed Non-DOT Physical.      Total $55      Chasity Kimbrough

## 2018-05-07 NOTE — PROGRESS NOTES
Subjective:      Patient ID: Shane Savage is a 42 y.o. male.    Chief Complaint: No chief complaint on file.      HPI       Review of Systems  Objective:     Physical Exam  Assessment:     No diagnosis found.  Plan:     New Prescriptions    No medications on file     Discontinued Medications    No medications on file     Modified Medications    No medications on file       There are no diagnoses linked to this encounter.

## 2018-05-08 ENCOUNTER — OFFICE VISIT (OUTPATIENT)
Dept: FAMILY MEDICINE | Facility: CLINIC | Age: 43
End: 2018-05-08
Payer: COMMERCIAL

## 2018-05-08 VITALS
WEIGHT: 315 LBS | HEIGHT: 73 IN | BODY MASS INDEX: 41.75 KG/M2 | DIASTOLIC BLOOD PRESSURE: 84 MMHG | SYSTOLIC BLOOD PRESSURE: 140 MMHG | HEART RATE: 66 BPM

## 2018-05-08 DIAGNOSIS — J41.1 MUCOPURULENT CHRONIC BRONCHITIS: ICD-10-CM

## 2018-05-08 DIAGNOSIS — E66.01 SEVERE OBESITY (BMI >= 40): ICD-10-CM

## 2018-05-08 DIAGNOSIS — I10 ESSENTIAL HYPERTENSION: Primary | ICD-10-CM

## 2018-05-08 PROCEDURE — 99214 OFFICE O/P EST MOD 30 MIN: CPT | Mod: S$GLB,,, | Performed by: FAMILY MEDICINE

## 2018-05-08 PROCEDURE — 3008F BODY MASS INDEX DOCD: CPT | Mod: CPTII,S$GLB,, | Performed by: FAMILY MEDICINE

## 2018-05-08 PROCEDURE — 99999 PR PBB SHADOW E&M-EST. PATIENT-LVL III: CPT | Mod: PBBFAC,,, | Performed by: FAMILY MEDICINE

## 2018-05-08 PROCEDURE — 3077F SYST BP >= 140 MM HG: CPT | Mod: CPTII,S$GLB,, | Performed by: FAMILY MEDICINE

## 2018-05-08 PROCEDURE — 3079F DIAST BP 80-89 MM HG: CPT | Mod: CPTII,S$GLB,, | Performed by: FAMILY MEDICINE

## 2018-05-08 RX ORDER — AMOXICILLIN AND CLAVULANATE POTASSIUM 875; 125 MG/1; MG/1
1 TABLET, FILM COATED ORAL 2 TIMES DAILY
Qty: 20 TABLET | Refills: 0 | Status: SHIPPED | OUTPATIENT
Start: 2018-05-08 | End: 2018-05-18

## 2018-05-08 RX ORDER — PROMETHAZINE HYDROCHLORIDE AND DEXTROMETHORPHAN HYDROBROMIDE 6.25; 15 MG/5ML; MG/5ML
5 SYRUP ORAL 4 TIMES DAILY PRN
Qty: 180 ML | Refills: 0 | Status: SHIPPED | OUTPATIENT
Start: 2018-05-08 | End: 2018-05-18

## 2018-05-08 NOTE — PROGRESS NOTES
Subjective:       Patient ID: Shane Savage is a 42 y.o. male.    Chief Complaint: Cough    42 years old male came to the clinic with cough and congestion for the last 4 weeks.  The cough is productive with yellowish sputum.  Patient was using over-the-counter medicine with no significant improvement.  No fever or chills.  Blood pressure today is elevated.  No chest pain palpitations orthopnea or PND.  Patient did not take his blood pressure medicine for the last couple of days.  Patient with a BMI of 45 currently trying to lose weight.      Cough   Pertinent negatives include no chest pain, chills or fever.     Review of Systems   Constitutional: Negative.  Negative for chills and fever.   HENT: Positive for congestion.    Eyes: Negative.    Respiratory: Positive for cough.    Cardiovascular: Negative.  Negative for chest pain, palpitations and leg swelling.   Gastrointestinal: Negative.    Genitourinary: Negative.    Musculoskeletal: Negative.    Skin: Negative.    Neurological: Negative.    Psychiatric/Behavioral: Negative.        Objective:      Physical Exam   Constitutional: He is oriented to person, place, and time. He appears well-developed and well-nourished. No distress.   HENT:   Head: Normocephalic and atraumatic.   Right Ear: External ear normal.   Left Ear: External ear normal.   Nose: Nose normal.   Mouth/Throat: Oropharynx is clear and moist. No oropharyngeal exudate.   Eyes: Conjunctivae and EOM are normal. Pupils are equal, round, and reactive to light. Right eye exhibits no discharge. Left eye exhibits no discharge. No scleral icterus.   Neck: Normal range of motion. Neck supple. No JVD present. No tracheal deviation present. No thyromegaly present.   Cardiovascular: Normal rate, regular rhythm, normal heart sounds and intact distal pulses.  Exam reveals no gallop and no friction rub.    No murmur heard.  Pulmonary/Chest: Effort normal. No stridor. No respiratory distress. He has no wheezes. He  has rhonchi in the right middle field. He has no rales. He exhibits no tenderness.   Abdominal: Soft. Bowel sounds are normal. He exhibits no distension and no mass. There is no tenderness. There is no rebound and no guarding.   Musculoskeletal: Normal range of motion. He exhibits no edema or tenderness.   Lymphadenopathy:     He has no cervical adenopathy.   Neurological: He is alert and oriented to person, place, and time. He has normal reflexes. He displays normal reflexes. No cranial nerve deficit. He exhibits normal muscle tone. Coordination normal.   Skin: Skin is warm and dry. No rash noted. He is not diaphoretic. No erythema. No pallor.   Psychiatric: He has a normal mood and affect. His behavior is normal. Judgment and thought content normal.   Nursing note and vitals reviewed.      Assessment:       1. Essential hypertension    2. Mucopurulent chronic bronchitis    3. Severe obesity (BMI >= 40)        Plan:     Shane was seen today for cough.    Diagnoses and all orders for this visit:    Essential hypertension    Mucopurulent chronic bronchitis  -     amoxicillin-clavulanate 875-125mg (AUGMENTIN) 875-125 mg per tablet; Take 1 tablet by mouth 2 (two) times daily.  -     promethazine-dextromethorphan (PROMETHAZINE-DM) 6.25-15 mg/5 mL Syrp; Take 5 mLs by mouth 4 (four) times daily as needed.  -     X-Ray Chest PA And Lateral; Future    Severe obesity (BMI >= 40)     Diet and physical activity to promote weight loss.    Continue monitoring blood pressure at home, low sodium diet.

## 2018-05-08 NOTE — PATIENT INSTRUCTIONS
What Is Chronic Bronchitis?  Chronic bronchitis is when damaged lungs make more mucus than they should. If you cough up mucus and feel short of breath for at least three months each year, two or more years in a row, without another diagnosis to explain the cough, you may have chronic bronchitis.  Healthy lungs  This is what happens when your lungs are healthy:  · Inside the lungs are branching airways of stretchy tissue. Each airway is wrapped with bands of muscle that help keep it open. Air travels in and out of the lungs through these airways.  · The cells in the lining of the airways produce a sticky fluid called mucus. This traps dust, smoke, and other particles in the air you breathe and helps protect the lungs.  · Tiny hairs, called cilia, then sweep the mucus up the airways to the throat, where it is swallowed or coughed up, again to protect the lungs.         When you have chronic bronchitis  This is what happens when you have chronic bronchitis:  · Cells in the airways make more mucus than normal. The mucus builds up, narrowing the airways. This means less air travels into and out of the lungs.  · The lining of the airways may also become inflamed (swollen). And, the muscle surrounding the airways may constrict (tighten). These problems cause the airways to narrow even more.  · The cilia may also be damaged. This means they cant sweep mucus and particles away. This damage makes the problems described above even worse.         Date Last Reviewed: 5/1/2016  © 2469-2166 [employer]. [address] [place] [address], [place] [address]. All rights reserved. This information is not intended as a substitute for professional medical care. Always follow your healthcare professional's instructions.

## 2018-05-09 ENCOUNTER — CLINICAL SUPPORT (OUTPATIENT)
Dept: FAMILY MEDICINE | Facility: CLINIC | Age: 43
End: 2018-05-09

## 2018-05-09 DIAGNOSIS — Z00.00 ROUTINE GENERAL MEDICAL EXAMINATION AT A HEALTH CARE FACILITY: Primary | ICD-10-CM

## 2018-05-09 NOTE — PROGRESS NOTES
Shane has presented today for Post-Accident screening on behalf of [facility] [employer] ([facility] [employer]. Shane Savage has completed Non-DOT Physical.    Rose Mary Lindquist        Total charge is 55.00

## 2018-05-18 ENCOUNTER — CLINICAL SUPPORT (OUTPATIENT)
Dept: FAMILY MEDICINE | Facility: CLINIC | Age: 43
End: 2018-05-18

## 2018-05-18 DIAGNOSIS — Z00.00 ROUTINE GENERAL MEDICAL EXAMINATION AT A HEALTH CARE FACILITY: Primary | ICD-10-CM

## 2018-05-18 PROCEDURE — 99201 PR OFFICE/OUTPT VISIT,NEW,LEVL I: CPT | Mod: S$GLB,,, | Performed by: FAMILY MEDICINE

## 2018-05-18 NOTE — PROGRESS NOTES
Shane has presented today for Post-Accident screening on behalf of [facility] [employer] ([employer] [employer]. Shane Savage has completed Non-DOT Physical.    Total charge is 55.00    Rose Mary Lindquist

## 2018-06-13 ENCOUNTER — OFFICE VISIT (OUTPATIENT)
Dept: FAMILY MEDICINE | Facility: CLINIC | Age: 43
End: 2018-06-13
Payer: COMMERCIAL

## 2018-06-13 VITALS
SYSTOLIC BLOOD PRESSURE: 138 MMHG | DIASTOLIC BLOOD PRESSURE: 82 MMHG | HEART RATE: 68 BPM | BODY MASS INDEX: 41.75 KG/M2 | WEIGHT: 315 LBS | HEIGHT: 73 IN

## 2018-06-13 DIAGNOSIS — S86.912A KNEE STRAIN, LEFT, INITIAL ENCOUNTER: ICD-10-CM

## 2018-06-13 DIAGNOSIS — I10 ESSENTIAL HYPERTENSION: Primary | ICD-10-CM

## 2018-06-13 DIAGNOSIS — E66.01 SEVERE OBESITY (BMI >= 40): ICD-10-CM

## 2018-06-13 PROCEDURE — 99214 OFFICE O/P EST MOD 30 MIN: CPT | Mod: S$GLB,,, | Performed by: FAMILY MEDICINE

## 2018-06-13 PROCEDURE — 3008F BODY MASS INDEX DOCD: CPT | Mod: CPTII,S$GLB,, | Performed by: FAMILY MEDICINE

## 2018-06-13 PROCEDURE — 99999 PR PBB SHADOW E&M-EST. PATIENT-LVL IV: CPT | Mod: PBBFAC,,, | Performed by: FAMILY MEDICINE

## 2018-06-13 PROCEDURE — 3075F SYST BP GE 130 - 139MM HG: CPT | Mod: CPTII,S$GLB,, | Performed by: FAMILY MEDICINE

## 2018-06-13 PROCEDURE — 3079F DIAST BP 80-89 MM HG: CPT | Mod: CPTII,S$GLB,, | Performed by: FAMILY MEDICINE

## 2018-06-13 RX ORDER — PIROXICAM 20 MG/1
20 CAPSULE ORAL DAILY
Qty: 30 CAPSULE | Refills: 0 | Status: SHIPPED | OUTPATIENT
Start: 2018-06-13 | End: 2018-12-06

## 2018-06-13 NOTE — PROGRESS NOTES
Subjective:       Patient ID: Shane Savage is a 42 y.o. male.    Chief Complaint: Knee Pain    42 years old male came to the clinic with left knee sprain for the last week.  The pain is 6/10 of intensity on and off aggravated with activity and better with rest.  Patient blood pressure today stable.  No chest pain, palpitation, orthopnea or PND.  Patient with a BMI of 44 currently trying to lose weight.      Knee Pain        Review of Systems   Constitutional: Negative.    HENT: Negative.    Eyes: Negative.    Respiratory: Negative.    Cardiovascular: Negative.    Gastrointestinal: Negative.    Genitourinary: Negative.    Musculoskeletal: Positive for arthralgias.   Skin: Negative.    Neurological: Negative.    Psychiatric/Behavioral: Negative.        Objective:      Physical Exam   Constitutional: He is oriented to person, place, and time. He appears well-developed and well-nourished. No distress.   HENT:   Head: Normocephalic and atraumatic.   Right Ear: External ear normal.   Left Ear: External ear normal.   Nose: Nose normal.   Mouth/Throat: Oropharynx is clear and moist. No oropharyngeal exudate.   Eyes: Conjunctivae and EOM are normal. Pupils are equal, round, and reactive to light. Right eye exhibits no discharge. Left eye exhibits no discharge. No scleral icterus.   Neck: Normal range of motion. Neck supple. No JVD present. No tracheal deviation present. No thyromegaly present.   Cardiovascular: Normal rate, regular rhythm, normal heart sounds and intact distal pulses.  Exam reveals no gallop and no friction rub.    No murmur heard.  Pulmonary/Chest: Effort normal and breath sounds normal. No stridor. No respiratory distress. He has no wheezes. He has no rales. He exhibits no tenderness.   Abdominal: Soft. Bowel sounds are normal. He exhibits no distension and no mass. There is no tenderness. There is no rebound and no guarding.   Musculoskeletal: Normal range of motion. He exhibits no edema.        Left  knee: Tenderness found. Medial joint line and lateral joint line tenderness noted.   Lymphadenopathy:     He has no cervical adenopathy.   Neurological: He is alert and oriented to person, place, and time. He has normal reflexes. He displays normal reflexes. No cranial nerve deficit. He exhibits normal muscle tone. Coordination normal.   Skin: Skin is warm and dry. No rash noted. He is not diaphoretic. No erythema. No pallor.   Psychiatric: He has a normal mood and affect. His behavior is normal. Judgment and thought content normal.   Nursing note and vitals reviewed.      Assessment:       1. Essential hypertension    2. Knee strain, left, initial encounter    3. Severe obesity (BMI >= 40)        Plan:         Shane was seen today for knee pain.    Diagnoses and all orders for this visit:    Essential hypertension  -     NURSING COMMUNICATION: [facility]  -     Hypertension Digital Medicine (HDMP) Enrollment Order    Knee strain, left, initial encounter  -     X-Ray Knee 1 or 2 View Left; Future  -     Ambulatory referral to Orthopedics  -     piroxicam (FELDENE) 20 MG capsule; Take 1 capsule (20 mg total) by mouth once daily.    Severe obesity (BMI >= 40)     Diet and physical activity to promote weight loss.  Continue monitoring blood pressure at home, low sodium diet.

## 2018-06-13 NOTE — PATIENT INSTRUCTIONS

## 2018-06-14 ENCOUNTER — PATIENT MESSAGE (OUTPATIENT)
Dept: FAMILY MEDICINE | Facility: CLINIC | Age: 43
End: 2018-06-14

## 2018-06-14 ENCOUNTER — HOSPITAL ENCOUNTER (OUTPATIENT)
Dept: RADIOLOGY | Facility: HOSPITAL | Age: 43
Discharge: HOME | End: 2018-06-14
Attending: FAMILY MEDICINE
Payer: COMMERCIAL

## 2018-06-14 DIAGNOSIS — S86.912A KNEE STRAIN, LEFT, INITIAL ENCOUNTER: ICD-10-CM

## 2018-06-14 PROCEDURE — 73560 X-RAY EXAM OF KNEE 1 OR 2: CPT | Mod: TC,FY,PO,LT

## 2018-06-14 PROCEDURE — 73560 X-RAY EXAM OF KNEE 1 OR 2: CPT | Mod: 26,LT,, | Performed by: RADIOLOGY

## 2018-06-29 ENCOUNTER — OFFICE VISIT (OUTPATIENT)
Dept: ORTHOPEDICS | Facility: CLINIC | Age: 43
End: 2018-06-29
Payer: COMMERCIAL

## 2018-06-29 VITALS — BODY MASS INDEX: 41.75 KG/M2 | WEIGHT: 315 LBS | HEIGHT: 73 IN

## 2018-06-29 DIAGNOSIS — M17.12 PRIMARY OSTEOARTHRITIS OF LEFT KNEE: Primary | ICD-10-CM

## 2018-06-29 PROCEDURE — 3008F BODY MASS INDEX DOCD: CPT | Mod: CPTII,S$GLB,, | Performed by: ORTHOPAEDIC SURGERY

## 2018-06-29 PROCEDURE — 99214 OFFICE O/P EST MOD 30 MIN: CPT | Mod: 25,S$GLB,, | Performed by: ORTHOPAEDIC SURGERY

## 2018-06-29 PROCEDURE — 99999 PR PBB SHADOW E&M-EST. PATIENT-LVL II: CPT | Mod: PBBFAC,,, | Performed by: ORTHOPAEDIC SURGERY

## 2018-06-29 PROCEDURE — 20610 DRAIN/INJ JOINT/BURSA W/O US: CPT | Mod: LT,S$GLB,, | Performed by: ORTHOPAEDIC SURGERY

## 2018-06-29 RX ORDER — TRIAMCINOLONE ACETONIDE 40 MG/ML
40 INJECTION, SUSPENSION INTRA-ARTICULAR; INTRAMUSCULAR
Status: COMPLETED | OUTPATIENT
Start: 2018-06-29 | End: 2018-06-29

## 2018-06-29 RX ADMIN — TRIAMCINOLONE ACETONIDE 40 MG: 40 INJECTION, SUSPENSION INTRA-ARTICULAR; INTRAMUSCULAR at 11:06

## 2018-06-29 NOTE — PROGRESS NOTES
Subjective:      Patient ID: Shane Savage is a 42 y.o. male.    Chief Complaint: Pain of the Left Knee    HPI     They have experienced problems with their left knee over the past 1 month. The patient reports relevant history of injury/aggravation.  Minimal twisting episode experienced while visiting relative.  Pain is located medially Associated symptoms include NA.  Symptoms are aggravated by na. They have been treated with NSAIDS.   Symptoms have recently improved. Ambulation reportedly has not been impaired. Self care ADLs are not painful.     Review of Systems   Constitution: Negative for fever and weight loss.   HENT: Negative for congestion.    Eyes: Negative for visual disturbance.   Cardiovascular: Negative for chest pain.   Respiratory: Negative for shortness of breath.    Hematologic/Lymphatic: Negative for bleeding problem. Does not bruise/bleed easily.   Skin: Negative for poor wound healing.   Musculoskeletal: Positive for joint pain.   Gastrointestinal: Negative for abdominal pain.   Genitourinary: Negative for dysuria.   Neurological: Negative for seizures.   Psychiatric/Behavioral: Negative for altered mental status.   Allergic/Immunologic: Negative for persistent infections.         Objective:            Ortho/SPM Exam        There were no vitals filed for this visit.        Left Knee      There were no vitals filed for this visit.    The patient is not in acute distress.   Body habitus is obese.   The patient walks without a limp.  Resisted SLR negative.   The skin over the knee is intact.  Knee effusion 0  Tendernes is located absent  Range of motion- Flexion 135 deg, Extension 0 deg,   Ligament exam:   MCL intact   Lachman intact              Post sag intact    LCL intact  Patellar apprehension negative.  Popliteal cyst negative  Patellar crepitation absent.  Flexion/pinch negative.  Pulses DP present, PT present.  Motor normal 5/5 strength in all tested muscle groups.   Sensory normal    I  reviewed the relevant radiographic images for the patient's condition:  Recent knee films show very mild narrowing and small osteophytes.            Assessment:       Encounter Diagnosis   Name Primary?    Primary osteoarthritis of left knee Yes        the structural changes are mild..  Current acute flare is somewhat improved already  Plan:       Shane was seen today for pain.    Diagnoses and all orders for this visit:    Primary osteoarthritis of left knee          I explained my diagnostic impression and the reasoning behind it in detail, using layman's terms.  Models and/or pictures were used to help in the explanation.    Weight loss recommended    Appropriate home exercise program explained    Injection recommended and consent given

## 2018-06-29 NOTE — LETTER
June 29, 2018      Nuno White MD  [address] [facility] [address]           [facility]  [address] [place] [address] [address]  [facility] [address]  Phone: [phone]          Patient: Shane Savage   MR Number: [id number]   YOB: 1975   Date of Visit: 6/29/2018       Dear Dr. Nuno White:    Thank you for referring Shane Savage to me for evaluation. Attached you will find relevant portions of my assessment and plan of care.    If you have questions, please do not hesitate to call me. I look forward to following Shane Savage along with you.    Sincerely,    Larry Shabazz MD    Enclosure  CC:  No Recipients    If you would like to receive this communication electronically, please contact [email or web address] or [phone] to request more information on [facility] Link access.    For providers and/or their staff who would like to refer a patient to [facility], please contact us through our one-stop-shop provider referral line, [facility] , at [phone].    If you feel you have received this communication in error or would no longer like to receive these types of communications, please e-mail [email or web address]

## 2018-08-24 RX ORDER — HYDROCHLOROTHIAZIDE 12.5 MG/1
12.5 TABLET ORAL DAILY
Qty: 90 TABLET | Refills: 0 | Status: SHIPPED | OUTPATIENT
Start: 2018-08-24 | End: 2018-12-06 | Stop reason: SDUPTHER

## 2018-12-06 ENCOUNTER — OFFICE VISIT (OUTPATIENT)
Dept: FAMILY MEDICINE | Facility: CLINIC | Age: 43
End: 2018-12-06
Payer: COMMERCIAL

## 2018-12-06 VITALS
WEIGHT: 315 LBS | BODY MASS INDEX: 41.75 KG/M2 | DIASTOLIC BLOOD PRESSURE: 80 MMHG | OXYGEN SATURATION: 97 % | HEIGHT: 73 IN | HEART RATE: 68 BPM | SYSTOLIC BLOOD PRESSURE: 138 MMHG | TEMPERATURE: 98 F

## 2018-12-06 DIAGNOSIS — E66.01 MORBID OBESITY WITH BMI OF 40.0-44.9, ADULT: ICD-10-CM

## 2018-12-06 DIAGNOSIS — F17.200 TOBACCO DEPENDENCE: ICD-10-CM

## 2018-12-06 DIAGNOSIS — I10 ESSENTIAL HYPERTENSION: Primary | ICD-10-CM

## 2018-12-06 DIAGNOSIS — M47.816 LUMBAR FACET ARTHROPATHY: ICD-10-CM

## 2018-12-06 DIAGNOSIS — M54.2 NECK PAIN: ICD-10-CM

## 2018-12-06 PROCEDURE — 3075F SYST BP GE 130 - 139MM HG: CPT | Mod: CPTII,S$GLB,, | Performed by: FAMILY MEDICINE

## 2018-12-06 PROCEDURE — 99214 OFFICE O/P EST MOD 30 MIN: CPT | Mod: S$GLB,,, | Performed by: FAMILY MEDICINE

## 2018-12-06 PROCEDURE — 99999 PR PBB SHADOW E&M-EST. PATIENT-LVL IV: CPT | Mod: PBBFAC,,, | Performed by: FAMILY MEDICINE

## 2018-12-06 PROCEDURE — 3008F BODY MASS INDEX DOCD: CPT | Mod: CPTII,S$GLB,, | Performed by: FAMILY MEDICINE

## 2018-12-06 PROCEDURE — 3079F DIAST BP 80-89 MM HG: CPT | Mod: CPTII,S$GLB,, | Performed by: FAMILY MEDICINE

## 2018-12-06 RX ORDER — IBUPROFEN 600 MG/1
600 TABLET ORAL
Qty: 42 TABLET | Refills: 0 | Status: SHIPPED | OUTPATIENT
Start: 2018-12-06 | End: 2018-12-20

## 2018-12-06 RX ORDER — HYDROCHLOROTHIAZIDE 12.5 MG/1
12.5 TABLET ORAL DAILY
Qty: 90 TABLET | Refills: 0 | Status: SHIPPED | OUTPATIENT
Start: 2018-12-06 | End: 2019-04-03 | Stop reason: SDUPTHER

## 2018-12-06 RX ORDER — TIZANIDINE 4 MG/1
4 TABLET ORAL NIGHTLY PRN
Qty: 30 TABLET | Refills: 0 | Status: SHIPPED | OUTPATIENT
Start: 2018-12-06 | End: 2018-12-16

## 2018-12-06 NOTE — PROGRESS NOTES
Subjective:       Patient ID: Shane Savage is a 42 y.o. male.    Chief Complaint: Back Pain (chronic); Neck Pain (right side); and Hypertension (BP check + refill med)    Shane is a 42 y.o. male who presents today for an urgent care visit.     He has had back pain for many years; initially started in 1990 after a football surgery. He is also having neck pain. This started around 2014, 2015 after a accident at work. 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 He has seen pain management and this didn't help.     He has had HTN for many years. He is asymptomatic.       Back Pain   This is a chronic problem. The current episode started more than 1 year ago. The problem occurs intermittently. The problem has been waxing and waning since onset. The pain is present in the lumbar spine. The pain does not radiate. Stiffness is present in the morning. Pertinent negatives include no abdominal pain, bladder incontinence, bowel incontinence, chest pain, dysuria, fever, headaches, leg pain, numbness, pelvic pain, perianal numbness or tingling. Risk factors include obesity (old trauma). He has tried NSAIDs (pain management) for the symptoms. The treatment provided mild relief.   Neck Pain    This is a new problem. The current episode started more than 1 year ago. The problem occurs daily. The problem has been waxing and waning. The pain is associated with a remote injury. The pain is present in the right side and occipital region. The symptoms are aggravated by position. Pertinent negatives include no chest pain, fever, headaches, leg pain, numbness, pain with swallowing, syncope, tingling or trouble swallowing.   Hypertension   This is a chronic problem. The current episode started more than 1 year ago. Associated symptoms include neck pain. Pertinent negatives include no anxiety, blurred vision, chest pain, headaches, malaise/fatigue, orthopnea, palpitations, peripheral edema, PND or shortness of breath. There are no associated agents  to hypertension. Past treatments include diuretics. The current treatment provides moderate improvement.     Review of Systems   Constitutional: Negative for fever and malaise/fatigue.   HENT: Negative for trouble swallowing.    Eyes: Negative for blurred vision.   Respiratory: Negative for shortness of breath.    Cardiovascular: Negative for chest pain, palpitations, orthopnea, syncope and PND.   Gastrointestinal: Negative for abdominal pain and bowel incontinence.   Genitourinary: Negative for bladder incontinence, dysuria and pelvic pain.   Musculoskeletal: Positive for back pain and neck pain.   Skin: Negative for rash.   Neurological: Negative for dizziness, tingling, light-headedness, numbness and headaches.           Objective:     Vitals:    12/06/18 1056   BP: 138/80   Pulse: 68   Temp: 98 °F (36.7 °C)        Physical Exam   Constitutional: He is oriented to person, place, and time. He appears well-developed and well-nourished.   HENT:   Head: Normocephalic and atraumatic.   Eyes: Conjunctivae and EOM are normal. Pupils are equal, round, and reactive to light.   Neck: Normal range of motion. Neck supple.   Cardiovascular: Normal rate, regular rhythm and normal heart sounds.   Pulmonary/Chest: Effort normal and breath sounds normal.   Abdominal: Soft. Bowel sounds are normal. He exhibits no distension.   Musculoskeletal: He exhibits no edema or tenderness.   Limited ROM when turning neck to the right  Otherwise ROM of the neck and low back appear to be normal  Negative straight leg test BL  Very tight hamstrings BL with R>L   Neurological: He is alert and oriented to person, place, and time. No cranial nerve deficit or sensory deficit. He exhibits normal muscle tone. He displays a negative Romberg sign.   Finger to nose intact  Heel to shin intact  Strength and sensation grossly intract BL UE/LE  CN 2-12 grossly intact  PERRLA  Rapid alternating movement intact  Patellar reflex present and equal BL   Skin:  Skin is warm and dry.   Psychiatric: He has a normal mood and affect. His speech is normal and behavior is normal. Judgment and thought content normal.   Nursing note and vitals reviewed.      Assessment:       1. Essential hypertension    2. Morbid obesity with BMI of 40.0-44.9, adult    3. Neck pain    4. Lumbar facet arthropathy    5. Tobacco dependence        Plan:         Doesn't want Pain management or surgery for now. Defer imaging to PT as there are no warning signs. Reviewed XRays from 2015.   Ice to affected area as needed for local pain relief.  NSAIDs per medication orders.  Muscle relaxants per medication orders. THIS MAY BE SEDATING. PLEASE BE CAREFUL WITH DRIVING UNTIL YOU KNOW HOW THIS AFFECTS YOU  PT referral.    Refill BP medication.     Follow up with PCP in 3 months for physical.       Essential hypertension  -     hydroCHLOROthiazide (HYDRODIURIL) 12.5 MG Tab; Take 1 tablet (12.5 mg total) by mouth once daily.  Dispense: 90 tablet; Refill: 0    Morbid obesity with BMI of 40.0-44.9, adult  -     Ambulatory Referral to [facility] ([facility])  -     [facility] ASSIGN QUESTIONNAIRE SERIES ([facility])  -     [facility] Patient Entered [facility] ([facility])    Neck pain  -     tiZANidine (ZANAFLEX) 4 MG tablet; Take 1 tablet (4 mg total) by mouth nightly as needed.  Dispense: 30 tablet; Refill: 0  -     Ambulatory Referral to Physical/Occupational Therapy  -     ibuprofen (ADVIL,MOTRIN) 600 MG tablet; Take 1 tablet (600 mg total) by mouth 3 (three) times daily with meals. for 14 days  Dispense: 42 tablet; Refill: 0    Lumbar facet arthropathy  -     tiZANidine (ZANAFLEX) 4 MG tablet; Take 1 tablet (4 mg total) by mouth nightly as needed.  Dispense: 30 tablet; Refill: 0  -     Ambulatory Referral to Physical/Occupational Therapy  -     ibuprofen (ADVIL,MOTRIN) 600 MG tablet; Take 1 tablet (600 mg total) by mouth 3 (three) times daily with meals. for 14 days  Dispense: 42 tablet; Refill: 0    Tobacco  dependence  -     Ambulatory referral to Smoking Cessation Program        Warning signs discussed, patient to call with any further issues or worsening of symptoms.

## 2018-12-06 NOTE — PATIENT INSTRUCTIONS
Natural history and expected course discussed. Questions answered.  Educational material distributed.  Proper lifting, bending technique discussed.  Stretching exercises discussed.  Ice to affected area as needed for local pain relief.  NSAIDs per medication orders.  Muscle relaxants per medication orders. THIS MAY BE SEDATING. PLEASE BE CAREFUL WITH DRIVING UNTIL YOU KNOW HOW THIS AFFECTS YOU  PT referral.      Low-Salt Diet  This diet removes foods that are high in salt. It also limits the amount of salt you use when cooking. It is most often used for people with high blood pressure, edema (fluid retention), and kidney, liver, or heart disease.  Table salt contains the mineral sodium. Your body needs sodium to work normally. But too much sodium can make your health problems worse. Your healthcare provider is recommending a low-salt (also called low-sodium) diet for you. Your total daily allowance of salt is 1,500 to 2,300 milligrams (mg). It is less than 1 teaspoon of table salt. This means you can have only about 500 to 700 mg of sodium at each meal. People with certain health problems should limit salt intake to the lower end of the recommended range.    When you cook, dont add much salt. If you can cook without using salt, even better. Dont add salt to your food at the table.  When shopping, read food labels. Salt is often called sodium on the label. Choose foods that are salt-free, low salt, or very low salt. Note that foods with reduced salt may not lower your salt intake enough.    Beans, potatoes, and pasta  Ok: Dry beans, split peas, lentils, potatoes, rice, macaroni, pasta, spaghetti without added salt  Avoid: Potato chips, tortilla chips, and similar products  Breads and cereals  Ok: Low-sodium breads, rolls, cereals, and cakes; low-salt crackers, matzo crackers  Avoid: Salted crackers, pretzels, popcorn, French toast, pancakes, muffins  Dairy  Ok: Milk, chocolate milk, hot chocolate mix, low-salt  cheeses, and yogurt  Avoid: Processed cheese and cheese spreads; Roquefort, Camembert, and cottage cheese; buttermilk, instant breakfast drink  Desserts  Ok: Ice cream, frozen yogurt, juice bars, gelatin, cookies and pies, sugar, honey, jelly, hard candy  Avoid: Most pies, cakes and cookies prepared or processed with salt; instant pudding  Drinks  Ok: Tea, coffee, fizzy (carbonated) drinks, juices  Avoid: Flavored coffees, electrolyte replacement drinks, sports drinks  Meats  Ok: All fresh meat, fish, poultry, low-salt tuna, eggs, egg substitute  Avoid: Smoked, pickled, brine-cured, or salted meats and fish. This includes quinones, chipped beef, corned beef, hot dogs, deli meats, ham, kosher meats, salt pork, sausage, canned tuna, salted codfish, smoked salmon, herring, sardines, or anchovies.  Seasonings and spices  Ok: Most seasonings are okay. Good substitutes for salt include: fresh herb blends, hot sauce, lemon, garlic, byrd, vinegar, dry mustard, parsley, cilantro, horseradish, tomato paste, regular margarine, mayonnaise, unsalted butter, cream cheese, vegetable oil, cream, low-salt salad dressing and gravy.  Avoid: Regular ketchup, relishes, pickles, soy sauce, teriyaki sauce, Worcestershire sauce, BBQ sauce, tartar sauce, meat tenderizer, chili sauce, regular gravy, regular salad dressing, salted butter  Soups  Ok: Low-salt soups and broths made with allowed foods  Avoid: Bouillon cubes, soups with smoked or salted meats, regular soup and broth  Vegetables  Ok: Most vegetables are okay; also low-salt tomato and vegetable juices  Avoid: Sauerkraut and other brine-soaked vegetables; pickles and other pickled vegetables; tomato juice, olives  Date Last Reviewed: 8/1/2016  © 0867-9507 [email or web address]. [address] [place] [address], [place] [address]. All rights reserved. This information is not intended as a substitute for professional medical care. Always follow your healthcare professional's  instructions.

## 2019-03-19 ENCOUNTER — HOSPITAL ENCOUNTER (EMERGENCY)
Facility: HOSPITAL | Age: 44
Discharge: HOME | End: 2019-03-19
Attending: EMERGENCY MEDICINE
Payer: COMMERCIAL

## 2019-03-19 VITALS
SYSTOLIC BLOOD PRESSURE: 176 MMHG | RESPIRATION RATE: 20 BRPM | OXYGEN SATURATION: 96 % | WEIGHT: 315 LBS | DIASTOLIC BLOOD PRESSURE: 86 MMHG | HEIGHT: 73 IN | TEMPERATURE: 99 F | HEART RATE: 69 BPM | BODY MASS INDEX: 41.75 KG/M2

## 2019-03-19 DIAGNOSIS — M25.561 RIGHT KNEE PAIN, UNSPECIFIED CHRONICITY: Primary | ICD-10-CM

## 2019-03-19 DIAGNOSIS — T14.90XA TRAUMA: ICD-10-CM

## 2019-03-19 PROCEDURE — 25000003 PHARM REV CODE 250: Performed by: NURSE PRACTITIONER

## 2019-03-19 PROCEDURE — 99283 EMERGENCY DEPT VISIT LOW MDM: CPT

## 2019-03-19 RX ORDER — NAPROXEN 500 MG/1
500 TABLET ORAL 2 TIMES DAILY WITH MEALS
Qty: 20 TABLET | Refills: 0 | Status: SHIPPED | OUTPATIENT
Start: 2019-03-19 | End: 2019-04-04 | Stop reason: ALTCHOICE

## 2019-03-19 RX ORDER — KETOROLAC TROMETHAMINE 10 MG/1
10 TABLET, FILM COATED ORAL
Status: COMPLETED | OUTPATIENT
Start: 2019-03-19 | End: 2019-03-19

## 2019-03-19 RX ADMIN — KETOROLAC TROMETHAMINE 10 MG: 10 TABLET, FILM COATED ORAL at 12:03

## 2019-03-19 NOTE — ED PROVIDER NOTES
Encounter Date: 3/19/2019       History     Chief Complaint   Patient presents with    Knee Injury     pt presents to ED today c/o injury to right knee in [place] reports hitting it on wooden bench at work.      44 y/o male with PMH of chronic back and neck pain presents for right knee pain after an injury in December.  Pt states he hit knee on a bench at work but did not want to be evaluated by the work physician.  Pt states the knee swells at times, pain is worse with bending and walking.  Pt has taken his aunt's hydrocodone for pain, states it does help.      The history is provided by the patient.     Review of patient's allergies indicates:   Allergen Reactions    Iodine and iodide containing products      Past Medical History:   Diagnosis Date    Arthritis     Hypertension      Past Surgical History:   Procedure Laterality Date    gastic bypass       Family History   Problem Relation Age of Onset    Hypertension Mother     Diabetes Mother     Hypertension Father      Social History     Tobacco Use    Smoking status: Current Some Day Smoker     Packs/day: 1.00     Years: 9.00     Pack years: 9.00     Types: Cigarettes    Smokeless tobacco: Former User   Substance Use Topics    Alcohol use: Yes    Drug use: No     Review of Systems   Constitutional: Negative for activity change and fever.   Musculoskeletal: Positive for arthralgias (chronic), back pain (chronic) and neck pain (chronic). Negative for gait problem.        R knee pain   Skin: Negative for wound.   Allergic/Immunologic: Negative for immunocompromised state.   Neurological: Negative for weakness and numbness.   Hematological: Does not bruise/bleed easily.   All other systems reviewed and are negative.      Physical Exam     Initial Vitals [03/19/19 1207]   BP Pulse Resp Temp SpO2   (!) 176/86 69 20 98.9 °F (37.2 °C) 96 %      MAP       --         Physical Exam    Nursing note and vitals reviewed.  Constitutional: Vital signs are normal.  He appears well-developed and well-nourished. He is cooperative.  Non-toxic appearance. He does not appear ill. No distress.   HENT:   Head: Atraumatic.   Mouth/Throat: Mucous membranes are normal.   Eyes: Conjunctivae and EOM are normal.   Neck: Neck supple.   Cardiovascular: Normal rate and regular rhythm.   Pulmonary/Chest: Effort normal.   Abdominal: Normal appearance. There is no tenderness.   Musculoskeletal: Normal range of motion.        Right knee: He exhibits swelling (L superior of patella). He exhibits normal range of motion, no effusion, no ecchymosis, normal alignment, normal patellar mobility and no bony tenderness. No medial joint line and no lateral joint line tenderness noted.        Right ankle: Normal.        Right lower leg: Normal.   Neurological: He is alert and oriented to person, place, and time. He has normal strength. No cranial nerve deficit or sensory deficit.   Skin: Skin is warm, dry and intact. Capillary refill takes less than 2 seconds. No rash noted.   Psychiatric: He has a normal mood and affect. His speech is normal and behavior is normal.         ED Course   Procedures  Labs Reviewed - No data to display       Imaging Results          X-Ray Knee 3 View Right (Final result)  Result time 03/19/19 14:10:56    Final result by Shaun Sommers MD (03/19/19 14:10:56)                 Impression:      Negative for fracture      Electronically signed by: Shaun Sommers MD  Date:    03/19/2019  Time:    14:10             Narrative:    EXAMINATION:  XR KNEE 3 VIEW RIGHT    CLINICAL HISTORY:  Injury, unspecified, initial encounter    TECHNIQUE:  AP, lateral, and Merchant views of the right knee were performed.    COMPARISON:  Left knee 06/14/2018    FINDINGS:  Skeletal structures are intact.  No fracture, dislocation, or joint effusion is seen.  Joint spaces are satisfactory.  Mild degenerative spurring is present at several positions.  External clothing compresses the soft tissues.   No significant soft tissue swelling is detected.                                 Medical Decision Making:   Initial Assessment:   Pt presents for R knee pain after an injury in December. He hit knee on a bench at work but did not want to be evaluated by the work physician. Pain worse with flexion and walking.  Mild swelling to superior L patella area, no decreased ROM, ambulating without difficulty.  Pt requesting x-ray.  Differential Diagnosis:   Tendonitis, bursitis, patellar fx  Clinical Tests:   Radiological Study: Ordered and Reviewed  ED Management:  Xray R knee-No fracture, dislocation, or joint effusion is seen.  Joint spaces are satisfactory.  Mild degenerative spurring is present at several positions.  Pt d/c with naproxen Rx, pt states these do not help.  Explained to pt why use of narcotic pain pills are not indicated at this time for this type of pain. Pt to f/u with PCP for ortho referral.  Pt to return to ER for any concerns, a worsening or change in current condition. Pt verbalized understanding of all d/c instructions.     Other:   I have discussed this case with another health care provider.              Attending Attestation:     Physician Attestation Statement for NP/PA:   I discussed this assessment and plan of this patient with the NP/PA, but I did not personally examine the patient. The face to face encounter was performed by the NP/PA.                     Clinical Impression:       ICD-10-CM ICD-9-CM   1. Right knee pain, unspecified chronicity M25.561 719.46   2. Trauma T14.90XA 959.9                                Jason Olsen NP  03/19/19 1428       Bruno Zamora MD  03/19/19 1378

## 2019-03-21 ENCOUNTER — TELEPHONE (OUTPATIENT)
Dept: FAMILY MEDICINE | Facility: CLINIC | Age: 44
End: 2019-03-21

## 2019-03-21 NOTE — TELEPHONE ENCOUNTER
----- Message from Christian Brantley sent at 3/21/2019  9:48 AM CDT -----  Contact: self/[phone]  Type:  Same Day Appointment Request    Caller is requesting a same day appointment.  Caller declined first available appointment listed below.    Name of Caller: Shane Savage  When is the first available appointment? 03/30/19  Symptoms:Right knee pain/cyst in back  Best Call Back Number: [phone]  Additional Information: really wants to be seen today

## 2019-03-21 NOTE — TELEPHONE ENCOUNTER
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

## 2019-04-02 ENCOUNTER — TELEPHONE (OUTPATIENT)
Dept: FAMILY MEDICINE | Facility: CLINIC | Age: 44
End: 2019-04-02

## 2019-04-02 DIAGNOSIS — M23.206 OLD TEAR OF MENISCUS OF RIGHT KNEE, UNSPECIFIED MENISCUS, UNSPECIFIED TEAR TYPE: ICD-10-CM

## 2019-04-02 DIAGNOSIS — M25.561 RIGHT KNEE PAIN, UNSPECIFIED CHRONICITY: Primary | ICD-10-CM

## 2019-04-02 NOTE — TELEPHONE ENCOUNTER
----- Message from Patsy Hickman sent at 4/2/2019 10:14 AM CDT -----  Contact: Meryl w/ [employer] (WORK COMP) [phone](DIRECT LINE)  She needs the notes from the pt's first work comp visit for [employer] and the last most recent visit sent to her fax.  Chau Meryl @ fax # [phone].  Any further questions, call her at the direct line listed

## 2019-04-03 DIAGNOSIS — I10 ESSENTIAL HYPERTENSION: ICD-10-CM

## 2019-04-03 RX ORDER — HYDROCHLOROTHIAZIDE 12.5 MG/1
TABLET ORAL
Qty: 90 TABLET | Refills: 3 | Status: SHIPPED | OUTPATIENT
Start: 2019-04-03 | End: 2019-06-13 | Stop reason: SDUPTHER

## 2019-04-03 NOTE — TELEPHONE ENCOUNTER
Orders placed for Ortho Referral.  Tyesha is working on 1010 form and will fax to June with [employer].     Office note faxed to June as requested below.

## 2019-04-04 ENCOUNTER — OFFICE VISIT (OUTPATIENT)
Dept: FAMILY MEDICINE | Facility: CLINIC | Age: 44
End: 2019-04-04
Payer: COMMERCIAL

## 2019-04-04 VITALS
TEMPERATURE: 98 F | HEIGHT: 73 IN | WEIGHT: 315 LBS | OXYGEN SATURATION: 96 % | HEART RATE: 66 BPM | BODY MASS INDEX: 41.75 KG/M2 | SYSTOLIC BLOOD PRESSURE: 122 MMHG | DIASTOLIC BLOOD PRESSURE: 88 MMHG

## 2019-04-04 DIAGNOSIS — M54.2 NECK PAIN: Primary | ICD-10-CM

## 2019-04-04 DIAGNOSIS — M25.561 ACUTE PAIN OF RIGHT KNEE: ICD-10-CM

## 2019-04-04 DIAGNOSIS — M47.816 LUMBAR FACET ARTHROPATHY: ICD-10-CM

## 2019-04-04 DIAGNOSIS — M54.6 ACUTE RIGHT-SIDED THORACIC BACK PAIN: ICD-10-CM

## 2019-04-04 DIAGNOSIS — T14.90XA TRAUMA: ICD-10-CM

## 2019-04-04 DIAGNOSIS — E66.01 MORBID OBESITY WITH BMI OF 40.0-44.9, ADULT: ICD-10-CM

## 2019-04-04 PROCEDURE — 3079F DIAST BP 80-89 MM HG: CPT | Mod: CPTII,S$GLB,, | Performed by: FAMILY MEDICINE

## 2019-04-04 PROCEDURE — 99999 PR PBB SHADOW E&M-EST. PATIENT-LVL IV: ICD-10-PCS | Mod: PBBFAC,,, | Performed by: FAMILY MEDICINE

## 2019-04-04 PROCEDURE — 3079F PR MOST RECENT DIASTOLIC BLOOD PRESSURE 80-89 MM HG: ICD-10-PCS | Mod: CPTII,S$GLB,, | Performed by: FAMILY MEDICINE

## 2019-04-04 PROCEDURE — 3008F BODY MASS INDEX DOCD: CPT | Mod: CPTII,S$GLB,, | Performed by: FAMILY MEDICINE

## 2019-04-04 PROCEDURE — 96372 PR INJECTION,THERAP/PROPH/DIAG2ST, IM OR SUBCUT: ICD-10-PCS | Mod: S$GLB,,, | Performed by: FAMILY MEDICINE

## 2019-04-04 PROCEDURE — 96372 THER/PROPH/DIAG INJ SC/IM: CPT | Mod: S$GLB,,, | Performed by: FAMILY MEDICINE

## 2019-04-04 PROCEDURE — 3074F SYST BP LT 130 MM HG: CPT | Mod: CPTII,S$GLB,, | Performed by: FAMILY MEDICINE

## 2019-04-04 PROCEDURE — 99999 PR PBB SHADOW E&M-EST. PATIENT-LVL IV: CPT | Mod: PBBFAC,,, | Performed by: FAMILY MEDICINE

## 2019-04-04 PROCEDURE — 99215 PR OFFICE/OUTPT VISIT, EST, LEVL V, 40-54 MIN: ICD-10-PCS | Mod: 25,S$GLB,, | Performed by: FAMILY MEDICINE

## 2019-04-04 PROCEDURE — 99215 OFFICE O/P EST HI 40 MIN: CPT | Mod: 25,S$GLB,, | Performed by: FAMILY MEDICINE

## 2019-04-04 PROCEDURE — 3008F PR BODY MASS INDEX (BMI) DOCUMENTED: ICD-10-PCS | Mod: CPTII,S$GLB,, | Performed by: FAMILY MEDICINE

## 2019-04-04 PROCEDURE — 3074F PR MOST RECENT SYSTOLIC BLOOD PRESSURE < 130 MM HG: ICD-10-PCS | Mod: CPTII,S$GLB,, | Performed by: FAMILY MEDICINE

## 2019-04-04 RX ORDER — TIZANIDINE 4 MG/1
4 TABLET ORAL NIGHTLY PRN
Qty: 30 TABLET | Refills: 0 | Status: SHIPPED | OUTPATIENT
Start: 2019-04-04 | End: 2019-04-14

## 2019-04-04 RX ORDER — IBUPROFEN 600 MG/1
600 TABLET ORAL
Qty: 42 TABLET | Refills: 0 | Status: SHIPPED | OUTPATIENT
Start: 2019-04-04 | End: 2019-04-18

## 2019-04-04 RX ORDER — KETOROLAC TROMETHAMINE 30 MG/ML
30 INJECTION, SOLUTION INTRAMUSCULAR; INTRAVENOUS ONCE
Status: COMPLETED | OUTPATIENT
Start: 2019-04-04 | End: 2019-04-04

## 2019-04-04 RX ADMIN — KETOROLAC TROMETHAMINE 30 MG: 30 INJECTION, SOLUTION INTRAMUSCULAR; INTRAVENOUS at 11:04

## 2019-04-04 NOTE — PROGRESS NOTES
"Subjective:       Patient ID: Shane Savage is a 43 y.o. male.    Chief Complaint: Knee Pain (Right) and Back Pain    Shane is a 43 y.o. male who presents today for an urgent care visit for back and knee pain.     He was seen in December for back pain. He has had back pain for many years; initially started in 1990 after a football surgery. He is also having neck pain. This started around 2014, 2015 after a accident at work. 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 He has seen pain management and this didn't help. He was given zanaflex at the last visit but he isn't sure if this helped. He was given ibuprofen and he doesn't remember if this helped. He thinks that this current neck pain is in a different area. He is not sure what triggered this pain. Stretching and "cracking his neck" makes it better.     His knee was was evaluated by the ED on 3/19/19. This was apparently secondary to a work injury. He also has an MRI scheduled for his knee. His knee pain has been ongoing for about 3 months also. He ran into a bench with his right knee. This initially improved but then worsened in March. He's unsure if this helps. Palpation and bending and going up stairs makes this worse. He limps when he walks. He was given naproxen but hasn't taken any.     Knee Pain    The incident occurred more than 1 week ago. The incident occurred at work. The pain is present in the right knee. Pertinent negatives include no inability to bear weight, loss of motion, loss of sensation, muscle weakness, numbness or tingling. It is unknown if a foreign body is present. The symptoms are aggravated by palpation. The treatment provided no relief.   Back Pain   This is a new problem. The current episode started more than 1 month ago. The problem occurs daily. Pain location: cervical spine. Pertinent negatives include no abdominal pain, bladder incontinence, bowel incontinence, chest pain, dysuria, fever, headaches, leg pain, numbness, pelvic pain, " tingling or weight loss. Risk factors include obesity.     Review of Systems   Constitutional: Negative for chills, fever and weight loss.   Cardiovascular: Negative for chest pain.   Gastrointestinal: Negative for abdominal pain and bowel incontinence.   Genitourinary: Negative for bladder incontinence, dysuria and pelvic pain.   Musculoskeletal: Positive for back pain, gait problem and joint swelling.   Skin: Negative for rash.   Neurological: Negative for tingling, numbness and headaches.         Objective:     Vitals:    04/04/19 1055   BP: 122/88   Pulse: 66   Temp: 98.3 °F (36.8 °C)        Physical Exam   Constitutional: He is oriented to person, place, and time. He appears well-developed and well-nourished.   Eyes: Pupils are equal, round, and reactive to light. Conjunctivae and EOM are normal.   Neck: Normal range of motion. Neck supple.   Cardiovascular: Normal rate, regular rhythm and normal heart sounds.   Pulmonary/Chest: Effort normal and breath sounds normal.   Abdominal: Soft.   obese   Musculoskeletal: He exhibits tenderness. He exhibits no edema.   Positive thessaly test (medial aspect, right knee)  Otherwise BL:  Negative vivi test.  Negative anterior drawer, posterior drawer.   Negative varus/valgus   Negative patellar grind test  Exam limited by body habitus    There was poor posture noted  There was extensive thoracic paraspinal muscle spasm noted (R)   Neurological: He is alert and oriented to person, place, and time. No cranial nerve deficit or sensory deficit. He exhibits normal muscle tone. He displays a negative Romberg sign.   Finger to nose intact  Heel to shin intact  Strength and sensation grossly intract BL UE/LE  CN 2-12 grossly intact  PERRLA  Rapid alternating movement intact  Patellar reflex diminished BL   Skin: Skin is warm and dry.   Psychiatric: He has a normal mood and affect. His speech is normal and behavior is normal. Judgment and thought content normal.   Nursing note  and vitals reviewed.      Assessment:       1. Neck pain    2. Acute right-sided thoracic back pain    3. Acute pain of right knee    4. Trauma    5. Morbid obesity with BMI of 40.0-44.9, adult    6. Lumbar facet arthropathy        Plan:       Natural history and expected course discussed. Questions answered.  Educational material distributed.  Proper lifting, bending technique discussed.  OMT performed today. HVLA and soft tissue. Patient tolerated this well.   Ice to affected area as needed for local pain relief.  NSAIDs per medication orders. Start tomorrow as you are getting an IM injection today  Muscle relaxants per medication orders. THIS MAY BE SEDATING. PLEASE BE CAREFUL WITH DRIVING UNTIL YOU KNOW HOW THIS AFFECTS YOU  PT referral.  Parenteral therapy given with IM NSAIDS. Further workup with XR ordered.     Knee pain may be meniscus tear, first line for non complicated tears is PT.     If pain persists, can consider Pain management in 6-12 weeks.       Neck pain  -     tiZANidine (ZANAFLEX) 4 MG tablet; Take 1 tablet (4 mg total) by mouth nightly as needed.  Dispense: 30 tablet; Refill: 0  -     Ambulatory referral to Pain Clinic    Acute right-sided thoracic back pain  -     tiZANidine (ZANAFLEX) 4 MG tablet; Take 1 tablet (4 mg total) by mouth nightly as needed.  Dispense: 30 tablet; Refill: 0  -     Ambulatory Referral to Physical/Occupational Therapy  -     ibuprofen (ADVIL,MOTRIN) 600 MG tablet; Take 1 tablet (600 mg total) by mouth 3 (three) times daily with meals. for 14 days  Dispense: 42 tablet; Refill: 0  -     ketorolac injection 30 mg  -     Ambulatory referral to Pain Clinic    Acute pain of right knee  -     Ambulatory Referral to Physical/Occupational Therapy  -     ibuprofen (ADVIL,MOTRIN) 600 MG tablet; Take 1 tablet (600 mg total) by mouth 3 (three) times daily with meals. for 14 days  Dispense: 42 tablet; Refill: 0  -     ketorolac injection 30 mg  -     Ambulatory referral to Pain  Clinic    Trauma  -     ketorolac injection 30 mg    Morbid obesity with BMI of 40.0-44.9, adult    Lumbar facet arthropathy  -     Ambulatory referral to Pain Clinic      Warning signs discussed, patient to call with any further issues or worsening of symptoms.

## 2019-04-04 NOTE — PATIENT INSTRUCTIONS
Natural history and expected course discussed. Questions answered.  Educational material distributed.  Proper lifting, bending technique discussed.  OMT performed today  Ice to affected area as needed for local pain relief.  NSAIDs per medication orders. Start tomorrow as you are getting an IM injection today  Muscle relaxants per medication orders. THIS MAY BE SEDATING. PLEASE BE CAREFUL WITH DRIVING UNTIL YOU KNOW HOW THIS AFFECTS YOU  PT referral.    Knee pain may be meniscus tear, first line for non complicated tears is PT.     If pain persists, can consider Pain management in 6-12 weeks.

## 2019-04-05 ENCOUNTER — TELEPHONE (OUTPATIENT)
Dept: ADMINISTRATIVE | Facility: OTHER | Age: 44
End: 2019-04-05

## 2019-04-09 ENCOUNTER — TELEPHONE (OUTPATIENT)
Dept: FAMILY MEDICINE | Facility: CLINIC | Age: 44
End: 2019-04-09

## 2019-04-09 NOTE — TELEPHONE ENCOUNTER
Spoke with Meryl in regards to message. She needed to clarify if pt was to have a CT scan done or a MRI. Informed Meryl that pt should be having an MRI. Meryl gave verbal authorization for MRI and stated that her team will be in contact with the pt to schedule MRI.

## 2019-04-09 NOTE — TELEPHONE ENCOUNTER
----- Message from Ciara Gomez sent at 4/9/2019  8:51 AM CDT -----  Contact: [employer]/June/[phone]  [employer]  [employer] is calling to verify the code on the 1010 report. Is it for a MRI or CT?

## 2019-04-10 ENCOUNTER — HOSPITAL ENCOUNTER (OUTPATIENT)
Dept: RADIOLOGY | Facility: HOSPITAL | Age: 44
Discharge: HOME | End: 2019-04-10
Attending: FAMILY MEDICINE
Payer: COMMERCIAL

## 2019-04-10 DIAGNOSIS — M23.206 OLD TEAR OF MENISCUS OF RIGHT KNEE, UNSPECIFIED MENISCUS, UNSPECIFIED TEAR TYPE: ICD-10-CM

## 2019-04-10 PROCEDURE — 73721 MRI KNEE WITHOUT CONTRAST RIGHT: ICD-10-PCS | Mod: 26,RT,, | Performed by: RADIOLOGY

## 2019-04-10 PROCEDURE — 73721 MRI JNT OF LWR EXTRE W/O DYE: CPT | Mod: 26,RT,, | Performed by: RADIOLOGY

## 2019-04-10 PROCEDURE — 73721 MRI JNT OF LWR EXTRE W/O DYE: CPT | Mod: TC,RT

## 2019-04-11 ENCOUNTER — TELEPHONE (OUTPATIENT)
Dept: FAMILY MEDICINE | Facility: CLINIC | Age: 44
End: 2019-04-11

## 2019-04-11 NOTE — TELEPHONE ENCOUNTER
Kip Pompa - Printed for paper chart.      ----- Message from Gentry Savage MD sent at 4/11/2019  6:54 AM CDT -----  Arthritis and joint fluid and Baker's cyst; no tear;

## 2019-05-03 ENCOUNTER — CLINICAL SUPPORT (OUTPATIENT)
Dept: REHABILITATION | Facility: HOSPITAL | Age: 44
End: 2019-05-03
Attending: FAMILY MEDICINE
Payer: COMMERCIAL

## 2019-05-03 DIAGNOSIS — R26.89 DECREASED MOBILITY: ICD-10-CM

## 2019-05-03 DIAGNOSIS — M25.661 DECREASED RANGE OF MOTION (ROM) OF RIGHT KNEE: ICD-10-CM

## 2019-05-03 DIAGNOSIS — R53.1 DECREASED STRENGTH: ICD-10-CM

## 2019-05-03 PROCEDURE — 97161 PT EVAL LOW COMPLEX 20 MIN: CPT | Mod: PN

## 2019-05-03 PROCEDURE — 97110 THERAPEUTIC EXERCISES: CPT | Mod: PN

## 2019-05-03 NOTE — PLAN OF CARE
[facility]  Physical Therapy Initial Evaluation    Name: Shane Savage  Clinic Number: [id number]    Therapy Diagnosis:   Encounter Diagnoses   Name Primary?    Decreased range of motion (ROM) of right knee     Decreased strength     Decreased mobility      Physician: Smith Ratliff DO    Physician Orders: PT Eval and Treat   Medical Diagnosis from Referral:   M54.6 (ICD-10-CM) - Acute right-sided thoracic back pain   M25.561 (ICD-10-CM) - Acute pain of right knee     Evaluation Date: 5/3/2019  Authorization Period Expiration: 04/03/2020  Plan of Care Expiration: 6/28/19  Visit # / Visits authorized: 1/ 1  FOTO: 1/5  PTA Visit: 0/6    Time In: 7:15 am  Time Out: 8:00 am  Total Billable Time: 45 minutes (1 LCE + 1 TE)    Precautions: chronic low back and neck pain, lumbar surgery, HTN    Subjective   Date of onset: 12/2018  History of current condition - Shane reports: injured this past December 23rd by hitting his knee on a bench with a twist that got better but re-injured it this past March climbing down an embankment stepping on a bucket that did not support him. His knee swelled up on him each time and pain ensued, reports of R knee buckling (not daily), and no loss of weight bearing on RLE throughout. Pain is episodic and biggest limitation is stairs, squatting, steep ramps, and prolonged standing/walking. Currently unable to walk regular 2-3 miles a day due to R knee pain. Denies any numbness or tingling or sharp pain. History of chronic low back pain without sciatica, but he says his right knee is the main issue he would like to have therapy for no his back, which is manages himself.     Medical History:   Past Medical History:   Diagnosis Date    Arthritis     Hypertension        Surgical History:   Shane Savage  has a past surgical history that includes gastic bypass.    Medications:   Shane has a current medication list which includes the following prescription(s):  hydrochlorothiazide.    Allergies:   Review of patient's allergies indicates:   Allergen Reactions    Iodine and iodide containing products         Imaging, MRI studies: 3/29/18  Numerous subcutaneous varicosities lateral and posterior and lesser degree medial aspects of knee joint, with comparison to previous radiographs appearing since 06/14/2018.  Septated popliteal cyst 7.9 x 5.2 x 6.4 cm containing dependent debris.  Mild moderate joint effusion with some intimal irregularity suprapatellar bursa.  Subcutaneous and juxta-articular edema.  Edema about portions of medial and lateral collateral ligaments particularly medially and anteriorly suggesting medial anterior collateral ligament strain.  Iliotibial band normal.  Subcortical marrow space edema medial posterior epiphysis medial femoral condyle, and adjacent medial anterior tibial plateau epiphysis.  Adjacent DJD changes with 0.5 cm chondral defect weight-bearing surface medial femoral condyle.  Additional edema central patella with adjacent osteochondral defect cartilage in subcortical area 1 x 0.5 cm medial trochlea area.  Some increased signal extensor tendon insertion anterior tibial tubercle and insertion superior patella, chronic change.  Edema within Hoffa's fat pad.  ACL and PCL normal.  Limited fraying apex adjacent inferior articular surface body medial meniscus without significant tear.  Lateral meniscus appears intact.  No fracture dislocation.    Prior Therapy: yes for low back a long time ago  Social History:  lives with their family, lives on 2nd floor apartment  Occupation:  at steel mill, stairs to enter work  Prior Level of Function: independent with all activities, slightly limited by chronic back pain  Current Level of Function: limited in stair negotiation, squatting, prolonged standing/walking  Pts goals: to relieve the pain    Pain:  Current 3/10, worst 6/10, best 2/10   Location: right knee   Description: Aching and  Dull  Aggravating Factors: Standing, Bending, Walking, Flexing and Getting out of bed/chair  Easing Factors: rest    Objective     Gait: min decreased L step length, min right trunk lean in right stance  Posture: WNL  DTR:    Right Left Comment   Patellar (L3-4) 2+ 2+    Achilles (S1) 2+ 2+      Sensation: light touch intact  Palpation: +TTP at psoterior R knee in popliteal space and distal quad tendon  Joint mobility: decreased R knee flexion    * = knee pain with testing  Hip  Right   Left  Pain/Dysfunction with Movement    AROM PROM MMT AROM PROM MMT    Flexion WFL WFL 5/5 WFL WFL 5/5    Extension WFL WFL 4/5 WFL WFL 4+/5    Abduction WFL WFL 4/5 WFL WFL 5/5    Adduction WFL WFL 5/5 WFL WFL 5/5    Internal rotation WFL WFL 5/5 WFL WFL 5/5    External rotation WFL WFL 5/5 WFL WFL 5/5       Knee  Right   Left  Pain/Dysfunction with Movement    AROM PROM MMT AROM PROM MMT    Flexion 112* 114* 5/5 131 132 5/5    Extension 0 0 4/5 0 0 5/5      Ankle  Right   Left  Pain/Dysfunction with Movement    AROM PROM MMT AROM PROM MMT    Plantarflexion WFL WFL 5/5 WFL WFL 5/5    Dorsiflexion WFL WFL 5/5 WFL WFL 5/5      Knee extension clearing test: positive on R for anterior knee pain  Marcus's Sign = negative, posterior knee pain on R  Patellar Grind Test = positive for anterior R knee pain  Knee Varus Stress Test = positive on R for medial/lateral knee pain, negative on L  Knee Valgus Stress Test = positive on R for medial knee pain, negative on L  Lachman Drawer Test = not tested  Anterior Drawer Test = negative B  Pivot Shift test = not tested  Alexander's Test = positive for R knee medial meniscus pain with clicking at mid range and more towards R knee extension  Apley's Test = negative on R  Thessaly's Test = not tested  Ga Test = not tested    CMS Impairment/Limitation/Restriction for FOTO KNEE Survey    Therapist reviewed FOTO scores for Shane ERIBERTO Janette on 5/3/2019.   FOTO documents entered into EPIC - see Media  section.    Limitation Score: 49%  Category: Mobility  Predicted: 34%       TREATMENT   Treatment Time In: 7:50 am  Treatment Time Out: 8:00 am  Total Treatment time separate from Evaluation: 10 minutes    Shane received therapeutic exercises to develop strength, endurance, ROM, flexibility and posture for 10 minutes including:    Hamstring stretch   1x30'' R katharine stretch  SLR     10x R  SL hip abd    10x R  Bridges    10x    Next:  Prone quad stretch   On right  Lateral lunges    On right  Step ups    L1 on right  Lateral step downs   L1 on right      Home Exercises and Patient Education Provided  Education provided: Yes  - correct body mechanics for knee health  - avoid any activities that causes sharp pain in right knee or repetitive buckling in right knee    Written Home Exercises Provided: yes.  Exercises were reviewed and Shane was able to demonstrate them prior to the end of the session.  Shane demonstrated good  understanding of the education provided.     See EMR under Media for exercises provided 5/3/2019.    Assessment   Shane is a 43 y.o. male referred to outpatient Physical Therapy with a medical diagnosis of Acute pain of right knee and Acute right-sided thoracic back pain at [facility]. Pt currently presents with right knee pain, decreased right knee ROM, decreased BLE strength, poor posture, impaired gait, and functional deficits with climbing ladders/steps, prolonged walking/standing, and squatting/stooping. Pt would benefit from skilled PT consisting of gait training, muscular skeletal stretching/strengthening, manual therapy, neuro muscular re-education, and modalities prn to address limitations and increase functional mobility.    Pt prognosis is Excellent.   Pt will benefit from skilled outpatient Physical Therapy to address the deficits stated above and in the chart below, provide pt/family education, and to maximize pt's level of independence.      Plan of care discussed with patient: Yes  Pt's spiritual, cultural and educational needs considered and patient is agreeable to the plan of care and goals as stated below:     Anticipated Barriers for therapy: chronic low back pain    Medical Necessity is demonstrated by the following  History  Co-morbidities and personal factors that may impact the plan of care Co-morbidities:   chronic low back pain, HTN, BMI over 30, visual impairment    Personal Factors:   no deficits     moderate   Examination  Body Structures and Functions, activity limitations and participation restrictions that may impact the plan of care Body Regions:   lower extremities  trunk    Body Systems:    gross symmetry  ROM  strength  gross coordinated movement  balance  gait  transfers  transitions  motor control    Participation Restrictions:   Community walking, recreational activities    Activity limitations:   Learning and applying knowledge  no deficits    General Tasks and Commands  no deficits    Communication  no deficits    Mobility  walking    Self care  no deficits    Domestic Life  doing house work (cleaning house, washing dishes, laundry)    Interactions/Relationships  no deficits    Life Areas  no deficits    Community and Social Life  no deficits         high   Clinical Presentation stable and uncomplicated low   Decision Making/ Complexity Score: low     GOALS: Short Term Goals:  4 weeks  1. Increase R knee AROM to 0-120 degrees in order to be able to perform ADLs without difficulty.  2. Increase strength by 1/3 MMT grade in BLE  to increase tolerance for ADL and work activities.  3. Pt to tolerate HEP to improve ROM and independence with ADL's.    Long Term Goals: 8 weeks  1.Report decreased right knee pain </= 1/10  to increase tolerance for ADLs and increased QoL.  2.Patient goal: to relieve the pain  3.Increase strength to >/= 4+/5 in BLE to increase tolerance for ADL and work activities.  4. Pt will report at </= 34%  impaired on KNEE FOTO to demonstrate increase in LE function with every day tasks.   5. Pt will negotiate 8 steps and ambulate community distances at >/= Mod I for increased functional mobility.  6. Increase R knee AROM to 0-130 degrees with minimal to no pain in order to be able to perform ADLs without difficulty.    Plan   Plan of care Certification: 5/3/2019 to 6/28/19/    Outpatient Physical Therapy 2 times weekly for 8 weeks to include the following interventions: Aquatic Therapy, Cervical/Lumbar Traction, Electrical Stimulation IFC/Russian, Gait Training, Manual Therapy, Moist Heat/ Ice, Neuromuscular Re-ed, Orthotic Management and Training, Patient Education, Self Care, Therapeutic Activites and Therapeutic Exercise.     Konrad Concepcion, PT

## 2019-05-06 PROBLEM — R53.1 DECREASED STRENGTH: Status: ACTIVE | Noted: 2019-05-06

## 2019-05-06 PROBLEM — M25.661 DECREASED RANGE OF MOTION (ROM) OF RIGHT KNEE: Status: ACTIVE | Noted: 2019-05-06

## 2019-05-06 PROBLEM — R26.89 DECREASED MOBILITY: Status: ACTIVE | Noted: 2019-05-06

## 2019-05-13 ENCOUNTER — TELEPHONE (OUTPATIENT)
Dept: REHABILITATION | Facility: HOSPITAL | Age: 44
End: 2019-05-13

## 2019-05-16 ENCOUNTER — TELEPHONE (OUTPATIENT)
Dept: REHABILITATION | Facility: HOSPITAL | Age: 44
End: 2019-05-16

## 2019-05-16 DIAGNOSIS — M25.661 DECREASED RANGE OF MOTION (ROM) OF RIGHT KNEE: ICD-10-CM

## 2019-05-16 DIAGNOSIS — R26.89 DECREASED MOBILITY: ICD-10-CM

## 2019-05-16 DIAGNOSIS — R53.1 DECREASED STRENGTH: ICD-10-CM

## 2019-05-23 ENCOUNTER — TELEPHONE (OUTPATIENT)
Dept: ADMINISTRATIVE | Facility: OTHER | Age: 44
End: 2019-05-23

## 2019-06-13 ENCOUNTER — OFFICE VISIT (OUTPATIENT)
Dept: FAMILY MEDICINE | Facility: CLINIC | Age: 44
End: 2019-06-13
Payer: COMMERCIAL

## 2019-06-13 VITALS
HEIGHT: 73 IN | SYSTOLIC BLOOD PRESSURE: 138 MMHG | DIASTOLIC BLOOD PRESSURE: 84 MMHG | BODY MASS INDEX: 41.75 KG/M2 | WEIGHT: 315 LBS | OXYGEN SATURATION: 97 % | HEART RATE: 75 BPM

## 2019-06-13 DIAGNOSIS — Z00.00 ANNUAL PHYSICAL EXAM: Primary | ICD-10-CM

## 2019-06-13 DIAGNOSIS — Z12.5 SCREENING FOR PROSTATE CANCER: ICD-10-CM

## 2019-06-13 DIAGNOSIS — E66.01 SEVERE OBESITY (BMI >= 40): ICD-10-CM

## 2019-06-13 DIAGNOSIS — I10 ESSENTIAL HYPERTENSION: ICD-10-CM

## 2019-06-13 DIAGNOSIS — M47.816 LUMBAR FACET ARTHROPATHY: ICD-10-CM

## 2019-06-13 PROCEDURE — 3008F PR BODY MASS INDEX (BMI) DOCUMENTED: ICD-10-PCS | Mod: CPTII,S$GLB,, | Performed by: FAMILY MEDICINE

## 2019-06-13 PROCEDURE — 99999 PR PBB SHADOW E&M-EST. PATIENT-LVL IV: CPT | Mod: PBBFAC,,, | Performed by: FAMILY MEDICINE

## 2019-06-13 PROCEDURE — 99214 OFFICE O/P EST MOD 30 MIN: CPT | Mod: S$GLB,,, | Performed by: FAMILY MEDICINE

## 2019-06-13 PROCEDURE — 99214 PR OFFICE/OUTPT VISIT, EST, LEVL IV, 30-39 MIN: ICD-10-PCS | Mod: S$GLB,,, | Performed by: FAMILY MEDICINE

## 2019-06-13 PROCEDURE — 99999 PR PBB SHADOW E&M-EST. PATIENT-LVL IV: ICD-10-PCS | Mod: PBBFAC,,, | Performed by: FAMILY MEDICINE

## 2019-06-13 PROCEDURE — 3079F DIAST BP 80-89 MM HG: CPT | Mod: CPTII,S$GLB,, | Performed by: FAMILY MEDICINE

## 2019-06-13 PROCEDURE — 3008F BODY MASS INDEX DOCD: CPT | Mod: CPTII,S$GLB,, | Performed by: FAMILY MEDICINE

## 2019-06-13 PROCEDURE — 3079F PR MOST RECENT DIASTOLIC BLOOD PRESSURE 80-89 MM HG: ICD-10-PCS | Mod: CPTII,S$GLB,, | Performed by: FAMILY MEDICINE

## 2019-06-13 PROCEDURE — 3075F SYST BP GE 130 - 139MM HG: CPT | Mod: CPTII,S$GLB,, | Performed by: FAMILY MEDICINE

## 2019-06-13 PROCEDURE — 3075F PR MOST RECENT SYSTOLIC BLOOD PRESS GE 130-139MM HG: ICD-10-PCS | Mod: CPTII,S$GLB,, | Performed by: FAMILY MEDICINE

## 2019-06-13 RX ORDER — ETODOLAC 400 MG/1
400 TABLET, EXTENDED RELEASE ORAL DAILY PRN
Qty: 30 TABLET | Refills: 0 | Status: SHIPPED | OUTPATIENT
Start: 2019-06-13 | End: 2019-07-13

## 2019-06-13 RX ORDER — HYDROCHLOROTHIAZIDE 12.5 MG/1
12.5 TABLET ORAL DAILY
Qty: 90 TABLET | Refills: 3 | Status: SHIPPED | OUTPATIENT
Start: 2019-06-13 | End: 2020-07-24

## 2019-06-13 NOTE — PATIENT INSTRUCTIONS
"  Understanding Body Mass Index (BMI)  Body mass index (BMI) is a method of screening for a weight category using the ratio of your height to your weight. The BMI is a measure of overweight that is corrected for height. Knowing your BMI is a way to tell if you are at a healthy weight. The higher your BMI, the greater your risk for weight-related health problems.  What BMI means  · BMI below 18.5: Underweight  · BMI 18.5 to 24.9: Healthy weight or "ideal body weight"   · BMI 25 to 29.9: Overweight  · BMI 30 and over: Obese  · BMI 40 and over: Severe obesity   Online BMI Calculators  Find your BMI with an online BMI calculator tool, such as these from the CDC:  · BMI calculator for adults  · BMI calculator for children and teens   Using the BMI chart  To figure out your BMI, find your height and weight (or the numbers closest to them) on the table below. Follow each column of numbers to where your height and weight meet on the table. That is your BMI.    Date Last Reviewed: 7/1/2016  © 8869-8437 [employer]. [address] [place] [address], [place] [address]. All rights reserved. This information is not intended as a substitute for professional medical care. Always follow your healthcare professional's instructions.        "

## 2019-06-13 NOTE — PROGRESS NOTES
Subjective:       Patient ID: Shane Savage is a 43 y.o. male.    Chief Complaint: Annual Exam    43 years old male came who came to the clinic for his physical examination.  Patient with a BMI of 43 currently trying to lose weight.  Patient with chronic lumbar pain. The pain is 6/10 of intensity on and off aggravated with activity and better with rest.  Patient with previous injury over the right knee at his workplace .  Patient is considering  chiropractor referral.  Blood pressure today is borderline .  No chest pain, palpitation, orthopnea or PND.    Review of Systems   Constitutional: Negative.    HENT: Negative.    Eyes: Negative.    Respiratory: Negative.    Cardiovascular: Negative.  Negative for chest pain, palpitations and leg swelling.   Gastrointestinal: Negative.    Genitourinary: Negative.    Musculoskeletal: Positive for arthralgias and back pain.   Skin: Negative.    Neurological: Negative.    Psychiatric/Behavioral: Negative.        Objective:      Physical Exam   Constitutional: He is oriented to person, place, and time. He appears well-developed and well-nourished. No distress.   HENT:   Head: Normocephalic and atraumatic.   Right Ear: External ear normal.   Left Ear: External ear normal.   Nose: Nose normal.   Mouth/Throat: Oropharynx is clear and moist. No oropharyngeal exudate.   Eyes: Pupils are equal, round, and reactive to light. Conjunctivae and EOM are normal. Right eye exhibits no discharge. Left eye exhibits no discharge. No scleral icterus.   Neck: Normal range of motion. Neck supple. No JVD present. No tracheal deviation present. No thyromegaly present.   Cardiovascular: Normal rate, regular rhythm, normal heart sounds and intact distal pulses. Exam reveals no gallop and no friction rub.   No murmur heard.  Pulmonary/Chest: Effort normal and breath sounds normal. No stridor. No respiratory distress. He has no wheezes. He has no rales. He exhibits no tenderness.   Abdominal: Soft.  Bowel sounds are normal. He exhibits no distension and no mass. There is no tenderness. There is no rebound and no guarding.   Musculoskeletal: Normal range of motion. He exhibits no edema.        Right knee: Tenderness found. Medial joint line and lateral joint line tenderness noted.        Lumbar back: He exhibits tenderness, pain and spasm.   Lymphadenopathy:     He has no cervical adenopathy.   Neurological: He is alert and oriented to person, place, and time. He has normal reflexes. He displays normal reflexes. No cranial nerve deficit. He exhibits normal muscle tone. Coordination normal.   Skin: Skin is warm and dry. No rash noted. He is not diaphoretic. No erythema. No pallor.   Psychiatric: He has a normal mood and affect. His behavior is normal. Judgment and thought content normal.   Nursing note and vitals reviewed.      Assessment:       1. Annual physical exam    2. Severe obesity (BMI >= 40)    3. Essential hypertension    4. Screening for prostate cancer    5. Lumbar facet arthropathy        Plan:         Shane was seen today for annual exam.    Diagnoses and all orders for this visit:    Annual physical exam  -     Comprehensive metabolic panel; Future    Severe obesity (BMI >= 40)  -     Comprehensive metabolic panel; Future  -     Lipid panel; Future    Essential hypertension  -     Hypertension Digital Medicine ([facility]) Enrollment Order  -     Hypertension Digital Medicine ([facility]): Assign Onboarding Questionnaires  -     Comprehensive metabolic panel; Future  -     Lipid panel; Future  -     Urinalysis; Future  -     TSH; Future  -     CBC auto differential; Future  -     hydroCHLOROthiazide (HYDRODIURIL) 12.5 MG Tab; Take 1 tablet (12.5 mg total) by mouth once daily.    Screening for prostate cancer  -     PSA, Screening; Future    Lumbar facet arthropathy  -     Ambulatory Referral to Chiropractic  -     etodolac (LODINE XL) 400 MG 24 hr tablet; Take 1 tablet (400 mg total) by mouth daily as needed  (pain).     Diet and physical activity to promote weight loss.  Continue monitoring blood pressure at home, low sodium diet.

## 2019-06-24 ENCOUNTER — LAB VISIT (OUTPATIENT)
Dept: LAB | Facility: HOSPITAL | Age: 44
End: 2019-06-24
Attending: FAMILY MEDICINE
Payer: COMMERCIAL

## 2019-06-24 DIAGNOSIS — Z00.00 ANNUAL PHYSICAL EXAM: ICD-10-CM

## 2019-06-24 DIAGNOSIS — Z12.5 SCREENING FOR PROSTATE CANCER: ICD-10-CM

## 2019-06-24 DIAGNOSIS — I10 ESSENTIAL HYPERTENSION: ICD-10-CM

## 2019-06-24 DIAGNOSIS — E66.01 SEVERE OBESITY (BMI >= 40): ICD-10-CM

## 2019-06-24 LAB
ALBUMIN SERPL BCP-MCNC: 3.7 G/DL (ref 3.5–5.2)
ALP SERPL-CCNC: 51 U/L (ref 55–135)
ALT SERPL W/O P-5'-P-CCNC: 24 U/L (ref 10–44)
ANION GAP SERPL CALC-SCNC: 7 MMOL/L (ref 8–16)
AST SERPL-CCNC: 30 U/L (ref 10–40)
BASOPHILS # BLD AUTO: 0.02 K/UL (ref 0–0.2)
BASOPHILS NFR BLD: 0.4 % (ref 0–1.9)
BILIRUB SERPL-MCNC: 0.8 MG/DL (ref 0.1–1)
BUN SERPL-MCNC: 12 MG/DL (ref 6–20)
CALCIUM SERPL-MCNC: 9.6 MG/DL (ref 8.7–10.5)
CHLORIDE SERPL-SCNC: 107 MMOL/L (ref 95–110)
CHOLEST SERPL-MCNC: 128 MG/DL (ref 120–199)
CHOLEST/HDLC SERPL: 2.6 {RATIO} (ref 2–5)
CO2 SERPL-SCNC: 26 MMOL/L (ref 23–29)
COMPLEXED PSA SERPL-MCNC: 0.44 NG/ML (ref 0–4)
CREAT SERPL-MCNC: 1 MG/DL (ref 0.5–1.4)
DIFFERENTIAL METHOD: ABNORMAL
EOSINOPHIL # BLD AUTO: 0.1 K/UL (ref 0–0.5)
EOSINOPHIL NFR BLD: 1.8 % (ref 0–8)
ERYTHROCYTE [DISTWIDTH] IN BLOOD BY AUTOMATED COUNT: 13.2 % (ref 11.5–14.5)
EST. GFR  (AFRICAN AMERICAN): >60 ML/MIN/1.73 M^2
EST. GFR  (NON AFRICAN AMERICAN): >60 ML/MIN/1.73 M^2
GLUCOSE SERPL-MCNC: 96 MG/DL (ref 70–110)
HCT VFR BLD AUTO: 43.2 % (ref 40–54)
HDLC SERPL-MCNC: 49 MG/DL (ref 40–75)
HDLC SERPL: 38.3 % (ref 20–50)
HGB BLD-MCNC: 13.9 G/DL (ref 14–18)
IMM GRANULOCYTES # BLD AUTO: 0.01 K/UL (ref 0–0.04)
IMM GRANULOCYTES NFR BLD AUTO: 0.2 % (ref 0–0.5)
LDLC SERPL CALC-MCNC: 57.2 MG/DL (ref 63–159)
LYMPHOCYTES # BLD AUTO: 1.8 K/UL (ref 1–4.8)
LYMPHOCYTES NFR BLD: 39.6 % (ref 18–48)
MCH RBC QN AUTO: 31.2 PG (ref 27–31)
MCHC RBC AUTO-ENTMCNC: 32.2 G/DL (ref 32–36)
MCV RBC AUTO: 97 FL (ref 82–98)
MONOCYTES # BLD AUTO: 0.5 K/UL (ref 0.3–1)
MONOCYTES NFR BLD: 10.1 % (ref 4–15)
NEUTROPHILS # BLD AUTO: 2.2 K/UL (ref 1.8–7.7)
NEUTROPHILS NFR BLD: 47.9 % (ref 38–73)
NONHDLC SERPL-MCNC: 79 MG/DL
NRBC BLD-RTO: 0 /100 WBC
PLATELET # BLD AUTO: 270 K/UL (ref 150–350)
PMV BLD AUTO: 11.2 FL (ref 9.2–12.9)
POTASSIUM SERPL-SCNC: 4.2 MMOL/L (ref 3.5–5.1)
PROT SERPL-MCNC: 7.1 G/DL (ref 6–8.4)
RBC # BLD AUTO: 4.45 M/UL (ref 4.6–6.2)
SODIUM SERPL-SCNC: 140 MMOL/L (ref 136–145)
TRIGL SERPL-MCNC: 109 MG/DL (ref 30–150)
TSH SERPL DL<=0.005 MIU/L-ACNC: 0.4 UIU/ML (ref 0.4–4)
WBC # BLD AUTO: 4.57 K/UL (ref 3.9–12.7)

## 2019-06-24 PROCEDURE — 85025 COMPLETE CBC W/AUTO DIFF WBC: CPT

## 2019-06-24 PROCEDURE — 84153 ASSAY OF PSA TOTAL: CPT

## 2019-06-24 PROCEDURE — 36415 COLL VENOUS BLD VENIPUNCTURE: CPT | Mod: PO

## 2019-06-24 PROCEDURE — 80061 LIPID PANEL: CPT

## 2019-06-24 PROCEDURE — 84443 ASSAY THYROID STIM HORMONE: CPT

## 2019-06-24 PROCEDURE — 80053 COMPREHEN METABOLIC PANEL: CPT

## 2019-08-27 ENCOUNTER — HOSPITAL ENCOUNTER (EMERGENCY)
Facility: HOSPITAL | Age: 44
Discharge: HOME | End: 2019-08-27
Attending: EMERGENCY MEDICINE
Payer: COMMERCIAL

## 2019-08-27 VITALS
RESPIRATION RATE: 24 BRPM | HEART RATE: 62 BPM | HEIGHT: 73 IN | TEMPERATURE: 98 F | DIASTOLIC BLOOD PRESSURE: 70 MMHG | BODY MASS INDEX: 41.75 KG/M2 | SYSTOLIC BLOOD PRESSURE: 132 MMHG | WEIGHT: 315 LBS | OXYGEN SATURATION: 97 %

## 2019-08-27 DIAGNOSIS — I10 HYPERTENSION: ICD-10-CM

## 2019-08-27 DIAGNOSIS — F43.9 STRESS: ICD-10-CM

## 2019-08-27 DIAGNOSIS — R06.02 SHORTNESS OF BREATH: ICD-10-CM

## 2019-08-27 DIAGNOSIS — G44.219 EPISODIC TENSION-TYPE HEADACHE, NOT INTRACTABLE: Primary | ICD-10-CM

## 2019-08-27 LAB
ALBUMIN SERPL BCP-MCNC: 3.9 G/DL (ref 3.5–5.2)
ALP SERPL-CCNC: 51 U/L (ref 55–135)
ALT SERPL W/O P-5'-P-CCNC: 29 U/L (ref 10–44)
ANION GAP SERPL CALC-SCNC: 8 MMOL/L (ref 8–16)
AST SERPL-CCNC: 35 U/L (ref 10–40)
BACTERIA #/AREA URNS HPF: ABNORMAL /HPF
BASOPHILS # BLD AUTO: 0.01 K/UL (ref 0–0.2)
BASOPHILS NFR BLD: 0.3 % (ref 0–1.9)
BILIRUB SERPL-MCNC: 0.8 MG/DL (ref 0.1–1)
BILIRUB UR QL STRIP: NEGATIVE
BNP SERPL-MCNC: 33 PG/ML (ref 0–99)
BUN SERPL-MCNC: 13 MG/DL (ref 6–20)
CALCIUM SERPL-MCNC: 9.5 MG/DL (ref 8.7–10.5)
CHLORIDE SERPL-SCNC: 104 MMOL/L (ref 95–110)
CLARITY UR: CLEAR
CO2 SERPL-SCNC: 26 MMOL/L (ref 23–29)
COLOR UR: ABNORMAL
CREAT SERPL-MCNC: 1.1 MG/DL (ref 0.5–1.4)
DIFFERENTIAL METHOD: ABNORMAL
EOSINOPHIL # BLD AUTO: 0.1 K/UL (ref 0–0.5)
EOSINOPHIL NFR BLD: 2.9 % (ref 0–8)
ERYTHROCYTE [DISTWIDTH] IN BLOOD BY AUTOMATED COUNT: 13.2 % (ref 11.5–14.5)
EST. GFR  (AFRICAN AMERICAN): >60 ML/MIN/1.73 M^2
EST. GFR  (NON AFRICAN AMERICAN): >60 ML/MIN/1.73 M^2
GLUCOSE SERPL-MCNC: 101 MG/DL (ref 70–110)
GLUCOSE UR QL STRIP: NEGATIVE
HCT VFR BLD AUTO: 41 % (ref 40–54)
HGB BLD-MCNC: 13.4 G/DL (ref 14–18)
HGB UR QL STRIP: ABNORMAL
HYALINE CASTS #/AREA URNS LPF: 2 /LPF
KETONES UR QL STRIP: NEGATIVE
LEUKOCYTE ESTERASE UR QL STRIP: NEGATIVE
LYMPHOCYTES # BLD AUTO: 1.5 K/UL (ref 1–4.8)
LYMPHOCYTES NFR BLD: 39.8 % (ref 18–48)
MCH RBC QN AUTO: 31.2 PG (ref 27–31)
MCHC RBC AUTO-ENTMCNC: 32.7 G/DL (ref 32–36)
MCV RBC AUTO: 96 FL (ref 82–98)
MICROSCOPIC COMMENT: ABNORMAL
MONOCYTES # BLD AUTO: 0.3 K/UL (ref 0.3–1)
MONOCYTES NFR BLD: 7.4 % (ref 4–15)
NEUTROPHILS # BLD AUTO: 1.9 K/UL (ref 1.8–7.7)
NEUTROPHILS NFR BLD: 49.6 % (ref 38–73)
NITRITE UR QL STRIP: NEGATIVE
PH UR STRIP: 6 [PH] (ref 5–8)
PLATELET # BLD AUTO: 248 K/UL (ref 150–350)
PMV BLD AUTO: 9.9 FL (ref 9.2–12.9)
POTASSIUM SERPL-SCNC: 4.2 MMOL/L (ref 3.5–5.1)
PROT SERPL-MCNC: 7.1 G/DL (ref 6–8.4)
PROT UR QL STRIP: ABNORMAL
RBC # BLD AUTO: 4.29 M/UL (ref 4.6–6.2)
RBC #/AREA URNS HPF: 4 /HPF (ref 0–4)
SODIUM SERPL-SCNC: 138 MMOL/L (ref 136–145)
SP GR UR STRIP: 1.02 (ref 1–1.03)
TROPONIN I SERPL DL<=0.01 NG/ML-MCNC: 0.01 NG/ML (ref 0–0.03)
URN SPEC COLLECT METH UR: ABNORMAL
UROBILINOGEN UR STRIP-ACNC: NEGATIVE EU/DL
WBC # BLD AUTO: 3.77 K/UL (ref 3.9–12.7)
WBC #/AREA URNS HPF: 1 /HPF (ref 0–5)

## 2019-08-27 PROCEDURE — 80053 COMPREHEN METABOLIC PANEL: CPT

## 2019-08-27 PROCEDURE — 85025 COMPLETE CBC W/AUTO DIFF WBC: CPT

## 2019-08-27 PROCEDURE — 81000 URINALYSIS NONAUTO W/SCOPE: CPT

## 2019-08-27 PROCEDURE — 25000003 PHARM REV CODE 250: Performed by: EMERGENCY MEDICINE

## 2019-08-27 PROCEDURE — 99285 EMERGENCY DEPT VISIT HI MDM: CPT | Mod: 25

## 2019-08-27 PROCEDURE — 93005 ELECTROCARDIOGRAM TRACING: CPT

## 2019-08-27 PROCEDURE — 83880 ASSAY OF NATRIURETIC PEPTIDE: CPT

## 2019-08-27 PROCEDURE — 93010 ELECTROCARDIOGRAM REPORT: CPT | Mod: ,,, | Performed by: INTERNAL MEDICINE

## 2019-08-27 PROCEDURE — 84484 ASSAY OF TROPONIN QUANT: CPT

## 2019-08-27 PROCEDURE — 93010 EKG 12-LEAD: ICD-10-PCS | Mod: ,,, | Performed by: INTERNAL MEDICINE

## 2019-08-27 RX ORDER — ACETAMINOPHEN 500 MG
500 TABLET ORAL EVERY 6 HOURS PRN
Qty: 28 TABLET | Refills: 0 | Status: SHIPPED | OUTPATIENT
Start: 2019-08-27 | End: 2020-03-10

## 2019-08-27 RX ORDER — HYDROCHLOROTHIAZIDE 25 MG/1
25 TABLET ORAL
Status: COMPLETED | OUTPATIENT
Start: 2019-08-27 | End: 2019-08-27

## 2019-08-27 RX ORDER — ACETAMINOPHEN 500 MG
1000 TABLET ORAL
Status: COMPLETED | OUTPATIENT
Start: 2019-08-27 | End: 2019-08-27

## 2019-08-27 RX ADMIN — ACETAMINOPHEN 1000 MG: 500 TABLET ORAL at 12:08

## 2019-08-27 RX ADMIN — HYDROCHLOROTHIAZIDE 25 MG: 25 TABLET ORAL at 10:08

## 2019-08-27 NOTE — ED PROVIDER NOTES
Encounter Date: 8/27/2019    SCRIBE #1 NOTE: I, Bob Mason, am scribing for, and in the presence of,  Daniel Rodríguez MD. I have scribed the entire note.       History     Chief Complaint   Patient presents with    Headache     intermittent headaches with dizziness and sob. bp elevated. pt denies taking hctz this am. pt also reports that he recently began smoking cigarettes again.      43 year-old male with PMHx HTN presents to the ED with c/o headache that started 2 days ago. Patient reports intermittent left sided headache with intermittent episodes of light-headedness. He has been taking his blood pressure medication. States he also started smoking again which could be the cause of his elevated blood pressure. He admits to mild SOB while in the shower earlier today (currently resolved) and left forearm paresthesias but denies chest pain, dizziness, diaphoresis, or abd pain. He denies known history of COPD or emphysema but reports family history (father) with stent placement. Patient reports increased stress with family and with work recently. He denies suicidal or homicidal ideations.    The history is provided by the patient.     Review of patient's allergies indicates:   Allergen Reactions    Iodine and iodide containing products      Past Medical History:   Diagnosis Date    Arthritis     Hypertension      Past Surgical History:   Procedure Laterality Date    gastic bypass       Family History   Problem Relation Age of Onset    Hypertension Mother     Diabetes Mother     Hypertension Father      Social History     Tobacco Use    Smoking status: Current Some Day Smoker     Packs/day: 1.00     Years: 9.00     Pack years: 9.00     Types: Cigarettes    Smokeless tobacco: Former User   Substance Use Topics    Alcohol use: Yes    Drug use: No     Review of Systems   Constitutional: Negative for chills, diaphoresis and fever.   HENT: Negative for rhinorrhea, sore throat and trouble swallowing.    Eyes:  Negative for visual disturbance.   Respiratory: Positive for shortness of breath (currently resolved). Negative for cough.    Cardiovascular: Negative for chest pain.   Gastrointestinal: Negative for abdominal pain, diarrhea and vomiting.   Genitourinary: Negative for dysuria and hematuria.   Musculoskeletal: Negative for back pain.   Skin: Negative for rash.   Neurological: Positive for light-headedness and headaches. Negative for dizziness.   Hematological: Negative for adenopathy.   Psychiatric/Behavioral: Negative for suicidal ideas.   All other systems reviewed and are negative.      Physical Exam     Initial Vitals [08/27/19 0938]   BP Pulse Resp Temp SpO2   (!) 186/105 95 18 98.3 °F (36.8 °C) 98 %      MAP       --         Physical Exam    Nursing note and vitals reviewed.  Constitutional: He appears well-developed and well-nourished. No distress.   HENT:   Head: Normocephalic and atraumatic.   Eyes: EOM are normal. Pupils are equal, round, and reactive to light.   Neck: Normal range of motion. Neck supple.   Cardiovascular: Normal rate, regular rhythm, normal heart sounds and intact distal pulses.   Pulmonary/Chest: Breath sounds normal. No respiratory distress. He has no wheezes.   Abdominal: Soft. He exhibits no distension. There is no tenderness.   Musculoskeletal: Normal range of motion. He exhibits no edema.   Neurological: He is alert and oriented to person, place, and time.   CN 2-12 intact  Finger to nose within normal limits  Negative romberg  Gait normal  Equal strength to bilateral upper extremities, SILT  Equal strength to bilateral lower extremities, SILT   Skin: Skin is warm and dry.         ED Course   Procedures  Labs Reviewed   CBC W/ AUTO DIFFERENTIAL - Abnormal; Notable for the following components:       Result Value    WBC 3.77 (*)     RBC 4.29 (*)     Hemoglobin 13.4 (*)     Mean Corpuscular Hemoglobin 31.2 (*)     All other components within normal limits   COMPREHENSIVE METABOLIC  PANEL - Abnormal; Notable for the following components:    Alkaline Phosphatase 51 (*)     All other components within normal limits   URINALYSIS, REFLEX TO URINE CULTURE - Abnormal; Notable for the following components:    Protein, UA 1+ (*)     Occult Blood UA Trace (*)     All other components within normal limits    Narrative:     Preferred Collection Type->Urine, Clean Catch   URINALYSIS MICROSCOPIC - Abnormal; Notable for the following components:    Bacteria Few (*)     Hyaline Casts, UA 2 (*)     All other components within normal limits    Narrative:     Preferred Collection Type->Urine, Clean Catch   TROPONIN I   B-TYPE NATRIURETIC PEPTIDE     EKG Readings: (Independently Interpreted)   EKG: rate: 66 bpm, normal sinus rhythm, no ST changes, no STEMI, normal intervals       Imaging Results          CT Head Without Contrast (Final result)  Result time 08/27/19 11:53:11    Final result by Haley Akbar MD (08/27/19 11:53:11)                 Impression:      No abnormality identified.      Electronically signed by: Haley Akbar MD  Date:    08/27/2019  Time:    11:53             Narrative:    EXAMINATION:  CT HEAD WITHOUT CONTRAST    CLINICAL HISTORY:  Headache, acute, norm neuro exam;    TECHNIQUE:  Low dose axial images were obtained through the head.  Coronal and sagittal reformations were also performed. Contrast was not administered.    COMPARISON:  None.    FINDINGS:  The brain parenchyma is of normal attenuation with no evidence of intracranial hemorrhage, major vascular distribution infarct or mass effect.  There are no extra-axial masses or fluid collections.  The ventricular system is of normal size for age and midline.    Visualized paranasal sinuses and mastoid air cells are clear.  There is no evidence of skull fracture.                               X-Ray Chest AP Portable (Final result)  Result time 08/27/19 10:42:28    Final result by Haley Akbar MD (08/27/19 10:42:28)                  Impression:      Clear lungs.      Electronically signed by: Haley Akbar MD  Date:    08/27/2019  Time:    10:42             Narrative:    EXAMINATION:  XR CHEST AP PORTABLE    CLINICAL HISTORY:  shortness of breath;    TECHNIQUE:  Single frontal view of the chest was performed.    COMPARISON:  Prior dated 09/15/2009    FINDINGS:  The mediastinal structures are midline.  The cardiac silhouette is not well evaluated due to AP technique.  The lungs appear grossly clear.    No osseous abnormalities are seen.                                 Medical Decision Making:   Differential Diagnosis:   Differential Diagnosis includes, but is not limited to:  Hypertensive encephalopathy, CVA/TIA, intracranial hemorrhage, MI/ACS, aortic/carotid artery dissection, AAA, hypertensive nephropathy, medication non-compliance, anxiety, drug abuse/intoxication, essential hypertension    Clinical Tests:   Lab Tests: Ordered and Reviewed  Radiological Study: Ordered and Reviewed  Medical Tests: Reviewed and Ordered  ED Management:  This is a 43-year-old male who presents with headache lightheaded hypertension shortness of breath that worsened shortly after took a shower.    Patient reports a recent stress for the past couple of weeks.    Proximity of symptoms to shower suspicious for vasovagal episode.    CT head is negative for acute pathology    Blood pressure responded to home dose of antihypertensives    Labs without significant abnormality    Patient has a overall reassuring neuro exam on reassessment he is improved.    I believe patient is stable for discharge return precautions immediately to the ED for worsening symptoms including headache nausea vomiting weakness trouble breathing chest pain syncope or any other concerns.    After taking into careful account the historical factors and physical exam findings of the patient's presentation today, in conjunction with the empirical and objective data obtained on ED  workup, no acute emergent medical condition has been identified. The patient appears to be low risk for an emergent medical condition and I feel it is safe and appropriate at this time for the patient to be discharged to follow-up as detailed in their discharge instructions for reevaluation and possible continued outpatient workup and management. I have discussed the specifics of the workup with the patient and the patient has verbalized understanding of the details of the workup, the diagnosis, the treatment plan, and the need for outpatient follow-up.  Although the patient has no emergent etiology today this does not preclude the development of an emergent condition so in addition, I have advised the patient that they can return to the ED and/or activate EMS at any time with worsening of their symptoms, change of their symptoms, or with any other medical complaint.  The patient remained comfortable and stable during their visit in the ED.  Discharge and follow-up instructions discussed with the patient who expressed understanding and willingness to comply with my recommendations.      Additional MDM:   PERC Rule:   Age is greater than or equal to 50 = 0.0  Heart Rate is greater than or equal to 100 = 0.0  SaO2 on room air < 95% = 0.0  Unilateral leg swelling = 0.0  Hemoptysis = 0.0  Recent surgery or trauma = 0.0  Prior PE or DVT =  0.0  Hormone use = 0.00  PERC Score = 0  Heart Score:    History:          Slightly suspicious.  ECG:             Normal  Age:               45-65 years  Risk factors: 1-2 risk factors  Troponin:       Less than or equal to normal limit  Final Score: 2                    ED Course as of Aug 27 1236   Tue Aug 27, 2019   1020 This is a 43-year-old male who presents with shortness of breath headache and lightheadedness.  Patient reports symptoms worsened after stepping out of a shower however currently he feels much better.  His neuro exam is nonfocal is lungs clear to auscultation  bilaterally. He is noted to be hypertensive however is not taking his home medications.  Will obtain x-ray evaluate for pneumonia or emergent pathology CT head given presence of headache and elevated blood pressure and will give home medications and reassess patient.  He additionally reported some paresthesias to his forearm however his sensation is intact to light touch to his fingers and his neuro exam is overall reassuring.    [RN]   1123 Urinalysis Microscopic(!) [RN]   1123 Urinalysis, Reflex to Urine Culture Urine, Clean Catch(!) [RN]   1123 Comprehensive metabolic panel(!) [RN]   1123 Brain natriuretic peptide [RN]   1123 CBC auto differential(!) [RN]   1159 CT Head Without Contrast [RN]      ED Course User Index  [RN] Daniel Rodríguez Jr., MD     Clinical Impression:       ICD-10-CM ICD-9-CM   1. Episodic tension-type headache, not intractable G44.219 339.11   2. Hypertension I10 401.9   3. Shortness of breath R06.02 786.05   4. Stress F43.9 V62.89           Disposition:   Disposition: Discharged  Condition: Stable        I, Daniel Rodríguez,  personally performed the services described in this documentation. All medical record entries made by the scribe were at my direction and in my presence.  I have reviewed the chart and agree that the record reflects my personal performance and is accurate and complete. Daniel Rodríguez M.D. 7:07 AM08/28/2019                   Daniel Rodríguez Jr., MD  08/28/19 0707

## 2019-08-27 NOTE — ED TRIAGE NOTES
Pt presented to ED with c/o HA,sob, dizziness, and blurry vision. Pt states he has been having a HA for last few days and this am after shower he started to feel sob and dizzy pt states he also has occasional breathlessness when he speaks, pt also states while waiting to be seen in ED he had numbness and tingling in middle of left arm , pt presently states all complaints have subsided except the HA ,pt denies any chest pain , abdominal pain , or n/v.pt has history of CHTN

## 2020-03-10 ENCOUNTER — HOSPITAL ENCOUNTER (EMERGENCY)
Facility: HOSPITAL | Age: 45
Discharge: HOME | End: 2020-03-10
Attending: EMERGENCY MEDICINE
Payer: MEDICAID

## 2020-03-10 VITALS
OXYGEN SATURATION: 98 % | HEART RATE: 69 BPM | DIASTOLIC BLOOD PRESSURE: 65 MMHG | SYSTOLIC BLOOD PRESSURE: 135 MMHG | TEMPERATURE: 99 F | RESPIRATION RATE: 20 BRPM | HEIGHT: 73 IN | BODY MASS INDEX: 41.08 KG/M2 | WEIGHT: 310 LBS

## 2020-03-10 DIAGNOSIS — M25.512 ACUTE PAIN OF LEFT SHOULDER: Primary | ICD-10-CM

## 2020-03-10 LAB
ALBUMIN SERPL BCP-MCNC: 3.5 G/DL (ref 3.5–5.2)
ALP SERPL-CCNC: 48 U/L (ref 55–135)
ALT SERPL W/O P-5'-P-CCNC: 21 U/L (ref 10–44)
ANION GAP SERPL CALC-SCNC: 5 MMOL/L (ref 8–16)
AST SERPL-CCNC: 28 U/L (ref 10–40)
BASOPHILS # BLD AUTO: 0 K/UL (ref 0–0.2)
BASOPHILS NFR BLD: 0 % (ref 0–1.9)
BILIRUB SERPL-MCNC: 0.9 MG/DL (ref 0.1–1)
BUN SERPL-MCNC: 13 MG/DL (ref 6–20)
CALCIUM SERPL-MCNC: 9 MG/DL (ref 8.7–10.5)
CHLORIDE SERPL-SCNC: 107 MMOL/L (ref 95–110)
CO2 SERPL-SCNC: 25 MMOL/L (ref 23–29)
CREAT SERPL-MCNC: 1.1 MG/DL (ref 0.5–1.4)
DIFFERENTIAL METHOD: ABNORMAL
EOSINOPHIL # BLD AUTO: 0.1 K/UL (ref 0–0.5)
EOSINOPHIL NFR BLD: 1.3 % (ref 0–8)
ERYTHROCYTE [DISTWIDTH] IN BLOOD BY AUTOMATED COUNT: 13.5 % (ref 11.5–14.5)
EST. GFR  (AFRICAN AMERICAN): >60 ML/MIN/1.73 M^2
EST. GFR  (NON AFRICAN AMERICAN): >60 ML/MIN/1.73 M^2
GLUCOSE SERPL-MCNC: 91 MG/DL (ref 70–110)
HCT VFR BLD AUTO: 39.5 % (ref 40–54)
HGB BLD-MCNC: 13 G/DL (ref 14–18)
IMM GRANULOCYTES # BLD AUTO: 0.01 K/UL (ref 0–0.04)
IMM GRANULOCYTES NFR BLD AUTO: 0.3 % (ref 0–0.5)
LYMPHOCYTES # BLD AUTO: 1.3 K/UL (ref 1–4.8)
LYMPHOCYTES NFR BLD: 32.7 % (ref 18–48)
MCH RBC QN AUTO: 31.6 PG (ref 27–31)
MCHC RBC AUTO-ENTMCNC: 32.9 G/DL (ref 32–36)
MCV RBC AUTO: 96 FL (ref 82–98)
MONOCYTES # BLD AUTO: 0.4 K/UL (ref 0.3–1)
MONOCYTES NFR BLD: 9.1 % (ref 4–15)
NEUTROPHILS # BLD AUTO: 2.2 K/UL (ref 1.8–7.7)
NEUTROPHILS NFR BLD: 56.6 % (ref 38–73)
NRBC BLD-RTO: 0 /100 WBC
PLATELET # BLD AUTO: 239 K/UL (ref 150–350)
PMV BLD AUTO: 9.9 FL (ref 9.2–12.9)
POTASSIUM SERPL-SCNC: 4.5 MMOL/L (ref 3.5–5.1)
PROT SERPL-MCNC: 6.5 G/DL (ref 6–8.4)
RBC # BLD AUTO: 4.12 M/UL (ref 4.6–6.2)
SODIUM SERPL-SCNC: 137 MMOL/L (ref 136–145)
TROPONIN I SERPL DL<=0.01 NG/ML-MCNC: <0.006 NG/ML (ref 0–0.03)
WBC # BLD AUTO: 3.85 K/UL (ref 3.9–12.7)

## 2020-03-10 PROCEDURE — 84484 ASSAY OF TROPONIN QUANT: CPT

## 2020-03-10 PROCEDURE — 99285 EMERGENCY DEPT VISIT HI MDM: CPT | Mod: 25

## 2020-03-10 PROCEDURE — 25000003 PHARM REV CODE 250: Performed by: EMERGENCY MEDICINE

## 2020-03-10 PROCEDURE — 85025 COMPLETE CBC W/AUTO DIFF WBC: CPT

## 2020-03-10 PROCEDURE — 80053 COMPREHEN METABOLIC PANEL: CPT

## 2020-03-10 PROCEDURE — 93005 ELECTROCARDIOGRAM TRACING: CPT

## 2020-03-10 RX ORDER — ASPIRIN 325 MG
325 TABLET ORAL
Status: COMPLETED | OUTPATIENT
Start: 2020-03-10 | End: 2020-03-10

## 2020-03-10 RX ADMIN — ASPIRIN 325 MG ORAL TABLET 325 MG: 325 PILL ORAL at 12:03

## 2020-03-10 NOTE — ED PROVIDER NOTES
Encounter Date: 3/10/2020    SCRIBE #1 NOTE: I, Reji Carmona, am scribing for, and in the presence of,  Dr. Rock. I have scribed the entire note.       History     Chief Complaint   Patient presents with    Chest Pain     Intermittent left sided chest pain since last night. Describes as sharp. No N/V, no h/o injury. Presents awake, alert, oriented. No distress.      Time seen by provider: 11:51 AM on 03/10/2020    The patient is a 44 y.o. male who presents to the ED with complaint of left shoulder pain beginning last night. He describes an intermittent pain to the left shoulder radiating down his left arm to the elbow and up the left side of his neck, occurring as distinct episodes lasting a few seconds at a time. No mitigating or exacerbating factors reported. Patient denies any recent trauma, CP, abdominal pain, nausea, vomiting, or any other symptoms at this time.     Patient  has a past medical history of Arthritis and Hypertension. Patient  has a past surgical history that includes gastic bypass.        The history is provided by the patient.     Review of patient's allergies indicates:   Allergen Reactions    Iodine and iodide containing products      Past Medical History:   Diagnosis Date    Arthritis     Hypertension      Past Surgical History:   Procedure Laterality Date    gastic bypass       Family History   Problem Relation Age of Onset    Hypertension Mother     Diabetes Mother     Hypertension Father      Social History     Tobacco Use    Smoking status: Current Every Day Smoker     Packs/day: 1.00     Years: 9.00     Pack years: 9.00     Types: Cigarettes    Smokeless tobacco: Former User   Substance Use Topics    Alcohol use: Yes    Drug use: No     Review of Systems   Cardiovascular: Positive for chest pain.   All other systems reviewed and are negative.      Physical Exam     Initial Vitals [03/10/20 1133]   BP Pulse Resp Temp SpO2   (!) 146/82 80 18 98.3 °F (36.8 °C) 99 %      MAP        --         Physical Exam    Nursing note and vitals reviewed.  Constitutional: He appears well-developed and well-nourished. He is not diaphoretic. No distress.   HENT:   Head: Normocephalic and atraumatic.   Mouth/Throat: Oropharynx is clear and moist.   Eyes: Conjunctivae and EOM are normal.   Neck: Normal range of motion. Neck supple.   Cardiovascular: Normal rate, regular rhythm and normal heart sounds.   Pulmonary/Chest: Breath sounds normal. No respiratory distress.   Abdominal: Soft. There is no tenderness.   Musculoskeletal: Normal range of motion. He exhibits no edema or tenderness.   Neurological: He is alert and oriented to person, place, and time. He has normal strength.   Skin: Skin is warm and dry.         ED Course   Procedures  Labs Reviewed   CBC W/ AUTO DIFFERENTIAL - Abnormal; Notable for the following components:       Result Value    WBC 3.85 (*)     RBC 4.12 (*)     Hemoglobin 13.0 (*)     Hematocrit 39.5 (*)     Mean Corpuscular Hemoglobin 31.6 (*)     All other components within normal limits   COMPREHENSIVE METABOLIC PANEL - Abnormal; Notable for the following components:    Alkaline Phosphatase 48 (*)     Anion Gap 5 (*)     All other components within normal limits   TROPONIN I     EKG Readings: (Independently Interpreted)   Rhythm: Normal Sinus Rhythm. Heart Rate: 78.   No ST elevations  Normal T waves     ECG Results          EKG 12-lead (In process)  Result time 03/10/20 14:01:06    In process by Interface, Lab In [facility] (03/10/20 14:01:06)                 Narrative:    Test Reason : R07.9,    Vent. Rate : 078 BPM     Atrial Rate : 078 BPM     P-R Int : 144 ms          QRS Dur : 076 ms      QT Int : 350 ms       P-R-T Axes : 030 032 060 degrees     QTc Int : 399 ms    Normal sinus rhythm  Normal ECG  When compared with ECG of 27-AUG-2019 10:24,  No significant change was found    Referred By: [email or web address]   SELF           Confirmed By:                             Imaging Results           X-Ray Chest 1 View (Final result)  Result time 03/10/20 13:34:08    Final result by Gabe Tan MD (03/10/20 13:34:08)                 Impression:      No acute abnormality.    Electronically signed by resident: Juventino Corbett  Date:    03/10/2020  Time:    13:22    Electronically signed by: Gabe Tan MD  Date:    03/10/2020  Time:    13:34             Narrative:    EXAMINATION:  XR CHEST 1 VIEW    CLINICAL HISTORY:  chest pain;    TECHNIQUE:  Single frontal view of the chest was performed.    COMPARISON:  08/27/2019    FINDINGS:  Monitor leads overlie the chest.    The lungs are clear, with normal appearance of pulmonary vasculature and no pleural effusion or pneumothorax.    The cardiac silhouette is normal in size. The hilar and mediastinal contours are unremarkable.    Bones are intact.                                 Medical Decision Making:   Initial Assessment:   44 year old male presents to the ED complaining of chest pain beginning last night. The patient was seen and examined. The history and physical exam was obtained. The nursing notes and vital signs were reviewed.     Secondary to symptoms and exam findings we ordered:    Labs: CBC, CMP, Troponin    Imaging: CXR    Patient will be administered Aspirin.  Patient will be monitored here.    Clinical Tests:   Lab Tests: Ordered and Reviewed  Radiological Study: Ordered and Reviewed  Medical Tests: Ordered and Reviewed  ED Management:  44-year-old male with left shoulder and left arm pain.  Patient describes his pain as only lasting a few seconds with complete spontaneous relief.  This is highly inconsistent with angina.  Workup here in the ED is unremarkable.  He will be discharged in stable condition and advised to follow-up with his primary physician as soon as able for recheck and further treatment as warranted.                   ED Course as of Mar 10 1414   Tue Mar 10, 2020   1413 On re-evaluation the patient's status has improved. I informed  the patient of test results. We discussed plan to discharge. I discussed discharge instructions and return precautions with patient and answered questions regarding treatment plan. The patient is stable for discharge.    [EW]      ED Course User Index  [EW] Reji Carmona                Clinical Impression:       ICD-10-CM ICD-9-CM   1. Acute pain of left shoulder M25.512 719.41         ED Disposition Condition    Discharge Stable        ED Prescriptions     None        Follow-up Information     Follow up With Specialties Details Why Contact Info    Nuno White MD Family Medicine Schedule an appointment as soon as possible for a visit   [address] [facility] [id number] [phone]                          I, Dr. Bruno Rock, personally performed the services described in this documentation. All medical record entries made by the scribe were at my direction and in my presence. I have reviewed the chart and agree that the record reflects my personal performance and is accurate and complete. Bruno Rock MD.  5:37 PM 03/10/2020       Bruno Rock MD  03/10/20 4202

## 2020-03-10 NOTE — ED TRIAGE NOTES
Intermittent left sided chest pain since last night at 1815. States pain radiates toward left side of neck and into left shoulder  Describes as sharp. No N/V, no h/o injury. Presents awake, alert, oriented. No distress.

## 2020-05-04 ENCOUNTER — TELEPHONE (OUTPATIENT)
Dept: FAMILY MEDICINE | Facility: CLINIC | Age: 45
End: 2020-05-04

## 2020-05-04 NOTE — TELEPHONE ENCOUNTER
Returned pt's call, he reports chest pains on Friday and Saturday. Denies SOB. Informed pt to report to the ER and then follow up with Dr. Coreas. He verbalized an understanding.

## 2020-05-04 NOTE — TELEPHONE ENCOUNTER
----- Message from Antonino Lynn sent at 5/4/2020 11:16 AM CDT -----  Type:  Same Day Appointment Request    Caller is requesting a same day appointment.  Caller declined first available appointment listed below.    Name of Caller: pt   Symptoms: chest pains   Best Call Back Number: [phone]  Additional Information:

## 2020-06-10 ENCOUNTER — HOSPITAL ENCOUNTER (EMERGENCY)
Facility: HOSPITAL | Age: 45
Discharge: HOME | End: 2020-06-10
Attending: EMERGENCY MEDICINE
Payer: MEDICAID

## 2020-06-10 VITALS
HEART RATE: 49 BPM | BODY MASS INDEX: 41.75 KG/M2 | TEMPERATURE: 99 F | DIASTOLIC BLOOD PRESSURE: 81 MMHG | HEIGHT: 73 IN | SYSTOLIC BLOOD PRESSURE: 157 MMHG | WEIGHT: 315 LBS | RESPIRATION RATE: 16 BRPM | OXYGEN SATURATION: 100 %

## 2020-06-10 DIAGNOSIS — R53.83 FATIGUE, UNSPECIFIED TYPE: Primary | ICD-10-CM

## 2020-06-10 DIAGNOSIS — R74.8 ELEVATED CK: ICD-10-CM

## 2020-06-10 DIAGNOSIS — R53.1 WEAKNESS: ICD-10-CM

## 2020-06-10 LAB
ALBUMIN SERPL BCP-MCNC: 3.6 G/DL (ref 3.5–5.2)
ALP SERPL-CCNC: 53 U/L (ref 55–135)
ALT SERPL W/O P-5'-P-CCNC: 25 U/L (ref 10–44)
ANION GAP SERPL CALC-SCNC: 6 MMOL/L (ref 8–16)
AST SERPL-CCNC: 30 U/L (ref 10–40)
BASOPHILS # BLD AUTO: 0 K/UL (ref 0–0.2)
BASOPHILS NFR BLD: 0 % (ref 0–1.9)
BILIRUB SERPL-MCNC: 0.3 MG/DL (ref 0.1–1)
BILIRUB UR QL STRIP: NEGATIVE
BUN SERPL-MCNC: 10 MG/DL (ref 6–20)
CALCIUM SERPL-MCNC: 9.1 MG/DL (ref 8.7–10.5)
CHLORIDE SERPL-SCNC: 105 MMOL/L (ref 95–110)
CK SERPL-CCNC: 574 U/L (ref 20–200)
CLARITY UR: CLEAR
CO2 SERPL-SCNC: 26 MMOL/L (ref 23–29)
COLOR UR: YELLOW
CREAT SERPL-MCNC: 0.9 MG/DL (ref 0.5–1.4)
DIFFERENTIAL METHOD: ABNORMAL
EOSINOPHIL # BLD AUTO: 0.1 K/UL (ref 0–0.5)
EOSINOPHIL NFR BLD: 2.3 % (ref 0–8)
ERYTHROCYTE [DISTWIDTH] IN BLOOD BY AUTOMATED COUNT: 13.2 % (ref 11.5–14.5)
EST. GFR  (AFRICAN AMERICAN): >60 ML/MIN/1.73 M^2
EST. GFR  (NON AFRICAN AMERICAN): >60 ML/MIN/1.73 M^2
GLUCOSE SERPL-MCNC: 82 MG/DL (ref 70–110)
GLUCOSE UR QL STRIP: NEGATIVE
HCT VFR BLD AUTO: 40.8 % (ref 40–54)
HGB BLD-MCNC: 13.3 G/DL (ref 14–18)
HGB UR QL STRIP: ABNORMAL
IMM GRANULOCYTES # BLD AUTO: 0.01 K/UL (ref 0–0.04)
IMM GRANULOCYTES NFR BLD AUTO: 0.2 % (ref 0–0.5)
KETONES UR QL STRIP: NEGATIVE
LACTATE SERPL-SCNC: 0.8 MMOL/L (ref 0.5–2.2)
LDH SERPL L TO P-CCNC: 210 U/L (ref 110–260)
LEUKOCYTE ESTERASE UR QL STRIP: NEGATIVE
LYMPHOCYTES # BLD AUTO: 1.2 K/UL (ref 1–4.8)
LYMPHOCYTES NFR BLD: 26.6 % (ref 18–48)
MCH RBC QN AUTO: 31.6 PG (ref 27–31)
MCHC RBC AUTO-ENTMCNC: 32.6 G/DL (ref 32–36)
MCV RBC AUTO: 97 FL (ref 82–98)
MONOCYTES # BLD AUTO: 0.4 K/UL (ref 0.3–1)
MONOCYTES NFR BLD: 8.5 % (ref 4–15)
NEUTROPHILS # BLD AUTO: 2.7 K/UL (ref 1.8–7.7)
NEUTROPHILS NFR BLD: 62.4 % (ref 38–73)
NITRITE UR QL STRIP: NEGATIVE
NRBC BLD-RTO: 0 /100 WBC
PH UR STRIP: 7 [PH] (ref 5–8)
PLATELET # BLD AUTO: 247 K/UL (ref 150–350)
PMV BLD AUTO: 10.1 FL (ref 9.2–12.9)
POTASSIUM SERPL-SCNC: 4.4 MMOL/L (ref 3.5–5.1)
PROT SERPL-MCNC: 6.7 G/DL (ref 6–8.4)
PROT UR QL STRIP: NEGATIVE
RBC # BLD AUTO: 4.21 M/UL (ref 4.6–6.2)
SARS-COV-2 RDRP RESP QL NAA+PROBE: NEGATIVE
SODIUM SERPL-SCNC: 137 MMOL/L (ref 136–145)
SP GR UR STRIP: 1.02 (ref 1–1.03)
TROPONIN I SERPL DL<=0.01 NG/ML-MCNC: 0.01 NG/ML (ref 0–0.03)
TSH SERPL DL<=0.005 MIU/L-ACNC: 0.6 UIU/ML (ref 0.4–4)
URN SPEC COLLECT METH UR: ABNORMAL
UROBILINOGEN UR STRIP-ACNC: NEGATIVE EU/DL
WBC # BLD AUTO: 4.33 K/UL (ref 3.9–12.7)

## 2020-06-10 PROCEDURE — 93010 EKG 12-LEAD: ICD-10-PCS | Mod: ,,, | Performed by: INTERNAL MEDICINE

## 2020-06-10 PROCEDURE — 81003 URINALYSIS AUTO W/O SCOPE: CPT

## 2020-06-10 PROCEDURE — 96360 HYDRATION IV INFUSION INIT: CPT

## 2020-06-10 PROCEDURE — 63600175 PHARM REV CODE 636 W HCPCS: Performed by: EMERGENCY MEDICINE

## 2020-06-10 PROCEDURE — 80053 COMPREHEN METABOLIC PANEL: CPT

## 2020-06-10 PROCEDURE — 82550 ASSAY OF CK (CPK): CPT

## 2020-06-10 PROCEDURE — U0002 COVID-19 LAB TEST NON-CDC: HCPCS

## 2020-06-10 PROCEDURE — 85025 COMPLETE CBC W/AUTO DIFF WBC: CPT

## 2020-06-10 PROCEDURE — 84484 ASSAY OF TROPONIN QUANT: CPT

## 2020-06-10 PROCEDURE — 99285 EMERGENCY DEPT VISIT HI MDM: CPT | Mod: 25

## 2020-06-10 PROCEDURE — 84443 ASSAY THYROID STIM HORMONE: CPT

## 2020-06-10 PROCEDURE — 93010 ELECTROCARDIOGRAM REPORT: CPT | Mod: ,,, | Performed by: INTERNAL MEDICINE

## 2020-06-10 PROCEDURE — 83615 LACTATE (LD) (LDH) ENZYME: CPT

## 2020-06-10 PROCEDURE — 96361 HYDRATE IV INFUSION ADD-ON: CPT

## 2020-06-10 PROCEDURE — 83605 ASSAY OF LACTIC ACID: CPT

## 2020-06-10 PROCEDURE — 93005 ELECTROCARDIOGRAM TRACING: CPT

## 2020-06-10 RX ADMIN — SODIUM CHLORIDE, SODIUM LACTATE, POTASSIUM CHLORIDE, AND CALCIUM CHLORIDE 1000 ML: .6; .31; .03; .02 INJECTION, SOLUTION INTRAVENOUS at 02:06

## 2020-06-10 NOTE — ED PROVIDER NOTES
Encounter Date: 6/10/2020    SCRIBE #1 NOTE: I, Yancy Whaley, am scribing for, and in the presence of,  Dr. Rodríguez. I have scribed the entire note.       History     Chief Complaint   Patient presents with    Fatigue     Generalized weakness and fatigue x 1 week with chills, body aches. No fever reported. No cough. Presents awake, alert, oriented. Skin w/d, No distress.      Shane Savage is a 44 y.o. male who  has a past medical history of Arthritis and Hypertension.    The patient presents to the ED due to fatigue and body aches for the past 3 weeks.   Upon getting out of bed this morning, the patient began to feel light-headed. Patient denies numbness, headache, pain, or any other concerning symptoms. He also denies change in appetite, but states he has not been drinking water.  Today he had half a 16 oz bottle of water and no other fluids.  Patient currently works 2 jobs and believes his fatigue may related to this.    The history is provided by the patient.     Review of patient's allergies indicates:   Allergen Reactions    Iodine and iodide containing products      Past Medical History:   Diagnosis Date    Arthritis     Hypertension      Past Surgical History:   Procedure Laterality Date    gastic bypass       Family History   Problem Relation Age of Onset    Hypertension Mother     Diabetes Mother     Hypertension Father      Social History     Tobacco Use    Smoking status: Current Every Day Smoker     Packs/day: 1.00     Years: 9.00     Pack years: 9.00     Types: Cigarettes    Smokeless tobacco: Former User   Substance Use Topics    Alcohol use: Yes    Drug use: No     Review of Systems   Constitutional: Positive for fatigue. Negative for chills and fever.   HENT: Negative for rhinorrhea, sore throat and trouble swallowing.    Eyes: Negative for visual disturbance.   Respiratory: Negative for cough and shortness of breath.    Cardiovascular: Negative for chest pain.   Gastrointestinal:  Negative for abdominal pain, diarrhea and vomiting.   Genitourinary: Negative for dysuria and hematuria.   Musculoskeletal: Positive for myalgias. Negative for back pain.   Skin: Negative for rash.   Neurological: Positive for weakness and light-headedness. Negative for numbness and headaches.   Hematological: Negative for adenopathy.       Physical Exam     Initial Vitals [06/10/20 1338]   BP Pulse Resp Temp SpO2   (!) 150/87 67 18 98.3 °F (36.8 °C) 97 %      MAP       --         Physical Exam    Nursing note and vitals reviewed.  Constitutional: He appears well-developed and well-nourished. He is not diaphoretic. No distress.   HENT:   Head: Normocephalic and atraumatic.   Mouth/Throat: Oropharynx is clear and moist.   Eyes: Conjunctivae and EOM are normal. Pupils are equal, round, and reactive to light.   Neck: Normal range of motion. Neck supple.   Cardiovascular: Normal rate, regular rhythm, normal heart sounds and intact distal pulses.   Pulmonary/Chest: Breath sounds normal. No respiratory distress.   Abdominal: Soft. Bowel sounds are normal. He exhibits no distension. There is no tenderness.   Musculoskeletal: Normal range of motion. He exhibits no edema or tenderness.   Neurological: He is alert and oriented to person, place, and time. He has normal strength.   CN 2-12 intact  Finger to nose within normal limits  Negative romberg  Gait normal  Equal strength to bilateral upper extremities, SILT  Equal strength to bilateral lower extremities, SILT     Skin: Skin is warm and dry. Capillary refill takes less than 2 seconds. No erythema.   Psychiatric: He has a normal mood and affect. His behavior is normal. Thought content normal.         ED Course   Procedures  Labs Reviewed   CBC W/ AUTO DIFFERENTIAL - Abnormal; Notable for the following components:       Result Value    RBC 4.21 (*)     Hemoglobin 13.3 (*)     Mean Corpuscular Hemoglobin 31.6 (*)     All other components within normal limits   COMPREHENSIVE  METABOLIC PANEL - Abnormal; Notable for the following components:    Alkaline Phosphatase 53 (*)     Anion Gap 6 (*)     All other components within normal limits   CK - Abnormal; Notable for the following components:     (*)     All other components within normal limits   URINALYSIS, REFLEX TO URINE CULTURE - Abnormal; Notable for the following components:    Occult Blood UA Trace (*)     All other components within normal limits    Narrative:     Preferred Collection Type->Urine, Clean Catch   LACTATE DEHYDROGENASE   TROPONIN I   SARS-COV-2 RNA AMPLIFICATION, QUAL   TSH   LACTIC ACID, PLASMA          Imaging Results          X-Ray Chest AP Portable (Final result)  Result time 06/10/20 14:49:44    Final result by Raman Abarca MD (06/10/20 14:49:44)                 Impression:      No acute radiographic findings in the chest.      Electronically signed by: Raman Abarca MD  Date:    06/10/2020  Time:    14:49             Narrative:    EXAMINATION:  XR CHEST AP PORTABLE    CLINICAL HISTORY:  Suspected Covid-19 Virus Infection;    TECHNIQUE:  Single frontal view of the chest was performed.    COMPARISON:  03/10/2020    FINDINGS:  Cardiac silhouette and pulmonary vascularity are within normal range.  Lungs show no evidence of significant focal consolidation, large effusion or pneumothorax.  Bones show no acute abnormalities.                                 Medical Decision Making:   Differential Diagnosis:   Differential Diagnosis includes, but is not limited to:  CVA/TIA, vertigo, anemia/blood loss, cardiogenic shock, arrhythmia, orthostatic hypotension, dehydration, medication side effect, vitamin deficiency, liver disease, hypothyroidism, drug intoxication/withdrawal, metabolic derangement.    Clinical Tests:   Lab Tests: Ordered and Reviewed  Radiological Study: Ordered and Reviewed  Medical Tests: Ordered and Reviewed  ED Management:  44-year-old male with 3 weeks of fatigue occasional body aches and  lightheadedness.  He has had similar symptoms before in the past.  Today his neuro exam is nonfocal the reports working 14 hr daily has not had much rest water or food.  He appears in no apparent distress on exam.  Discussed rectal exam/stool study for occult blood and patient deferred.    Labs remarkable for mild elevation of CPK.  Patient denies taking statins or drug use.  He was given IV fluids with improvement of his body aches.    EKG without evidence of dysrhythmia    Labs without additional significant abnormalities.    Recommended increased p.o. fluid symptomatic treatment.  PCP follow-up this week recommended.    Suspect dehydration S contributory to symptoms.  No acute emergent process has been identified.    Strict return precautions given for worsening symptoms including dizziness shortness of breath chest pain body aches fatigue or any other concerns.    After taking into careful account the historical factors and physical exam findings of the patient's presentation today, in conjunction with the empirical and objective data obtained on ED workup, no acute emergent medical condition has been identified. The patient appears to be low risk for an emergent medical condition and I feel it is safe and appropriate at this time for the patient to be discharged to follow-up as detailed in their discharge instructions for reevaluation and possible continued outpatient workup and management. I have discussed the specifics of the workup with the patient and the patient has verbalized understanding of the details of the workup, the diagnosis, the treatment plan, and the need for outpatient follow-up.  Although the patient has no emergent etiology today this does not preclude the development of an emergent condition so in addition, I have advised the patient that they can return to the ED and/or activate EMS at any time with worsening of their symptoms, change of their symptoms, or with any other medical complaint.   The patient remained comfortable and stable during their visit in the ED.  Discharge and follow-up instructions discussed with the patient who expressed understanding and willingness to comply with my recommendations.       Additional MDM:   PERC Rule:   Age is greater than or equal to 50 = 0.0  Heart Rate is greater than or equal to 100 = 0.0  SaO2 on room air < 95% = 0.0  Unilateral leg swelling = 0.0  Hemoptysis = 0.0  Recent surgery or trauma = 0.0  Prior PE or DVT =  0.0  Hormone use = 0.00  PERC Score = 0                ED Course as of Monty 10 1759   Wed Monty 10, 2020   1458 EKG:  Rate 52 sinus bradycardia noSTEMI or ectopy..     [RN]      ED Course User Index  [RN] Daniel Rodríguez Jr., MD                Clinical Impression:       ICD-10-CM ICD-9-CM   1. Fatigue, unspecified type R53.83 780.79   2. Weakness R53.1 780.79   3. Elevated CK R74.8 790.5                   Scribe attestation I, Daniel Rodríguez,  personally performed the services described in this documentation. All medical record entries made by the scribe were at my direction and in my presence.  I have reviewed the chart and agree that the record reflects my personal performance and is accurate and complete. Daniel Rodríguez M.D. 6:03 PM06/10/2020                Daniel Rodríguez Jr., MD  06/10/20 1803       Daniel Rodríguez Jr., MD  06/10/20 1803

## 2020-06-10 NOTE — ED NOTES
DR. Rodríguez informed of pt's discharge vitals. Pt denies chest pain, ha's, or dizziness. He reports he is complaint with BP medication. Pt advised to continue to take BP medication as prescribed and follow up with PCP. Pt verbalizes understanding.

## 2020-06-10 NOTE — ED NOTES
Pt presents to ED today c/o generalized fatigue and body aches onset x 1 week, denies fever or other respiratory symptoms.     APPEARANCE: Alert, oriented and in no acute distress.  CARDIAC: Normal rate and rhythm, no murmur heard.   RESPIRATORY:Normal rate and effort, breath sounds clear bilaterally throughout chest. Respirations are equal and unlabored no obvious signs of distress. Denies SOB  GASTRO: soft, bowel sounds normal, no tenderness, no abdominal distention.  MUSC: Full ROM. No bony tenderness or soft tissue tenderness. No obvious deformity. Generalized fatigue and body aches.   SKIN: Skin is warm and dry, normal skin turgor, mucous membranes moist.  NEURO: 5/5 strength major flexors/extensors bilaterally. Sensory intact to light touch bilaterally. Preston coma scale: eyes open spontaneously-4, oriented & converses-5, obeys commands-6. No neurological abnormalities.   MENTAL STATUS: awake, alert and aware of environment.

## 2020-06-11 ENCOUNTER — TELEPHONE (OUTPATIENT)
Dept: FAMILY MEDICINE | Facility: CLINIC | Age: 45
End: 2020-06-11

## 2020-06-11 NOTE — TELEPHONE ENCOUNTER
----- Message from Emerson Polk sent at 6/11/2020  7:58 AM CDT -----  Contact: self [phone]  Patient would like to be seen today. He was told to follow up from the ER. Please call and advise.

## 2020-06-18 ENCOUNTER — OFFICE VISIT (OUTPATIENT)
Dept: INTERNAL MEDICINE | Facility: CLINIC | Age: 45
End: 2020-06-18
Payer: MEDICAID

## 2020-06-18 VITALS
DIASTOLIC BLOOD PRESSURE: 78 MMHG | TEMPERATURE: 97 F | OXYGEN SATURATION: 99 % | HEIGHT: 73 IN | HEART RATE: 74 BPM | SYSTOLIC BLOOD PRESSURE: 130 MMHG | BODY MASS INDEX: 39.73 KG/M2 | WEIGHT: 299.81 LBS

## 2020-06-18 DIAGNOSIS — R53.83 FATIGUE, UNSPECIFIED TYPE: ICD-10-CM

## 2020-06-18 PROCEDURE — 99999 PR PBB SHADOW E&M-EST. PATIENT-LVL III: ICD-10-PCS | Mod: PBBFAC,,, | Performed by: INTERNAL MEDICINE

## 2020-06-18 PROCEDURE — 99213 PR OFFICE/OUTPT VISIT, EST, LEVL III, 20-29 MIN: ICD-10-PCS | Mod: S$PBB,,, | Performed by: INTERNAL MEDICINE

## 2020-06-18 PROCEDURE — 99999 PR PBB SHADOW E&M-EST. PATIENT-LVL III: CPT | Mod: PBBFAC,,, | Performed by: INTERNAL MEDICINE

## 2020-06-18 PROCEDURE — 99213 OFFICE O/P EST LOW 20 MIN: CPT | Mod: S$PBB,,, | Performed by: INTERNAL MEDICINE

## 2020-06-18 PROCEDURE — 99213 OFFICE O/P EST LOW 20 MIN: CPT | Mod: PBBFAC,PO | Performed by: INTERNAL MEDICINE

## 2020-06-18 NOTE — PROGRESS NOTES
"Subjective:       Patient ID: Shane Savage is a 44 y.o. male.    Chief Complaint: Follow-up  this patient is new to me    Hospital follow up   Admin/Discharge: Fatigue  Reason for admission: fatigue  Brief History: 5-6 hrs sleep  Pertinent Lab;  increase in CK  Pertinent Imaging; negative   Discharge Plan; f/u   Discharge pertinent meds: none    HPI  Review of Systems   All other systems reviewed and are negative.        Objective:       /78 (BP Location: Right arm, Patient Position: Sitting, BP Method: Medium (Manual))   Pulse 74   Temp 96.9 °F (36.1 °C)   Ht 6' 1" (1.854 m)   Wt 136 kg (299 lb 13.2 oz)   SpO2 99%   BMI 39.56 kg/m²     Physical Exam  Vitals signs and nursing note reviewed.   Constitutional:       General: He is not in acute distress.     Appearance: He is well-developed. He is not diaphoretic.   HENT:      Head: Normocephalic.      Mouth/Throat:      Pharynx: No oropharyngeal exudate.   Eyes:      General:         Right eye: No discharge.         Left eye: No discharge.      Conjunctiva/sclera: Conjunctivae normal.      Pupils: Pupils are equal, round, and reactive to light.   Cardiovascular:      Rate and Rhythm: Normal rate and regular rhythm.      Heart sounds: Normal heart sounds. No murmur. No friction rub. No gallop.    Pulmonary:      Effort: Pulmonary effort is normal. No respiratory distress.   Abdominal:      General: There is no distension.      Palpations: Abdomen is soft.   Skin:     General: Skin is warm.   Neurological:      Mental Status: He is alert.           BMP  Lab Results   Component Value Date     06/10/2020    K 4.4 06/10/2020     06/10/2020    CO2 26 06/10/2020    BUN 10 06/10/2020    CREATININE 0.9 06/10/2020    CALCIUM 9.1 06/10/2020    ANIONGAP 6 (L) 06/10/2020    ESTGFRAFRICA >60 06/10/2020    EGFRNONAA >60 06/10/2020     Lab Results   Component Value Date    TSH 0.601 06/10/2020       Assessment:       1. Fatigue, unspecified type      "   Plan:       Shane was seen today for follow-up.    Diagnoses and all orders for this visit:    Fatigue, unspecified type  No red flag features  Labs negative  F/u with PCP         Future Appointments   Date Time Provider Department Center   6/24/2020  5:00 PM Nuno White MD [facility]       Disclaimer:  This note has been generated using voice-recognition software. There may be grammatical or spelling errors that have been missed during proof-reading

## 2020-06-24 ENCOUNTER — OFFICE VISIT (OUTPATIENT)
Dept: FAMILY MEDICINE | Facility: CLINIC | Age: 45
End: 2020-06-24
Payer: MEDICAID

## 2020-06-24 DIAGNOSIS — M17.11 PRIMARY OSTEOARTHRITIS OF RIGHT KNEE: ICD-10-CM

## 2020-06-24 DIAGNOSIS — I10 ESSENTIAL HYPERTENSION: Primary | ICD-10-CM

## 2020-06-24 DIAGNOSIS — R07.89 ATYPICAL CHEST PAIN: ICD-10-CM

## 2020-06-24 DIAGNOSIS — G89.29 CHRONIC BILATERAL LOW BACK PAIN WITHOUT SCIATICA: ICD-10-CM

## 2020-06-24 DIAGNOSIS — M54.50 CHRONIC BILATERAL LOW BACK PAIN WITHOUT SCIATICA: ICD-10-CM

## 2020-06-24 DIAGNOSIS — R74.8 ELEVATED CPK: ICD-10-CM

## 2020-06-24 PROCEDURE — 99212 OFFICE O/P EST SF 10 MIN: CPT | Mod: 95,,, | Performed by: FAMILY MEDICINE

## 2020-06-24 PROCEDURE — 99212 PR OFFICE/OUTPT VISIT, EST, LEVL II, 10-19 MIN: ICD-10-PCS | Mod: 95,,, | Performed by: FAMILY MEDICINE

## 2020-06-24 RX ORDER — CYCLOBENZAPRINE HCL 5 MG
5 TABLET ORAL 3 TIMES DAILY PRN
Qty: 30 TABLET | Refills: 0 | Status: SHIPPED | OUTPATIENT
Start: 2020-06-24 | End: 2020-07-04

## 2020-06-24 RX ORDER — DICLOFENAC SODIUM 50 MG/1
50 TABLET, DELAYED RELEASE ORAL 2 TIMES DAILY
Qty: 30 TABLET | Refills: 0 | Status: SHIPPED | OUTPATIENT
Start: 2020-06-24 | End: 2020-07-09

## 2020-06-24 NOTE — PROGRESS NOTES
The patient location is:  [place]  The chief complaint leading to consultation is:  Right knee pain/back pain/elevated CPK/chest pain    Visit type: audiovisual    Face to Face time with patient: 15 min  5 minutes of total time spent on the encounter, which includes face to face time and non-face to face time preparing to see the patient (eg, review of tests), Obtaining and/or reviewing separately obtained history, Documenting clinical information in the electronic or other health record, Independently interpreting results (not separately reported) and communicating results to the patient/family/caregiver, or Care coordination (not separately reported).         Each patient to whom he or she provides medical services by telemedicine is:  (1) informed of the relationship between the physician and patient and the respective role of any other health care provider with respect to management of the patient; and (2) notified that he or she may decline to receive medical services by telemedicine and may withdraw from such care at any time.    Notes:  44 years old male who was evaluated by telemedicine.  Patient was recently emergency room with elevated CPK and tiredness.  Cardiovascular workup was normal.  Patient reports episodic chest pain for the last couple of months over the left side.  The pain is 3/10 of intensity on and off aggravated with activity and better with rest.  Patient denies previous stress test.  Patient also with right knee pain and back pain for the last year.  The pain is 7/10 of intensity on and off aggravated with activity and better with rest.    Shane was seen today for back pain, chest pain and knee pain.    Diagnoses and all orders for this visit:    Essential hypertension  -     Comprehensive metabolic panel; Future  -     CBC auto differential; Future    Atypical chest pain  -     Stress Echo Which stress agent will be used? Treadmill Exercise; Color Flow Doppler? No; Future    Chronic  bilateral low back pain without sciatica  -     diclofenac (VOLTAREN) 50 MG EC tablet; Take 1 tablet (50 mg total) by mouth 2 (two) times daily. for 15 days  -     cyclobenzaprine (FLEXERIL) 5 MG tablet; Take 1 tablet (5 mg total) by mouth 3 (three) times daily as needed.    Primary osteoarthritis of right knee  -     Ambulatory referral/consult to Orthopedics; Future  -     diclofenac (VOLTAREN) 50 MG EC tablet; Take 1 tablet (50 mg total) by mouth 2 (two) times daily. for 15 days  -     X-Ray Knee 1 or 2 View Right; Future    Elevated CPK  -     Aldolase; Future  -     CK; Future      Continue monitoring blood pressure at home, low sodium diet.

## 2020-06-25 ENCOUNTER — HOSPITAL ENCOUNTER (OUTPATIENT)
Dept: RADIOLOGY | Facility: HOSPITAL | Age: 45
Discharge: HOME | End: 2020-06-25
Attending: FAMILY MEDICINE
Payer: MEDICAID

## 2020-06-25 DIAGNOSIS — M17.11 PRIMARY OSTEOARTHRITIS OF RIGHT KNEE: ICD-10-CM

## 2020-06-25 PROCEDURE — 73560 X-RAY EXAM OF KNEE 1 OR 2: CPT | Mod: TC,FY,PO,RT

## 2020-06-25 PROCEDURE — 73560 X-RAY EXAM OF KNEE 1 OR 2: CPT | Mod: 26,RT,, | Performed by: RADIOLOGY

## 2020-06-25 PROCEDURE — 73560 XR KNEE 1 OR 2 VIEW RIGHT: ICD-10-PCS | Mod: 26,RT,, | Performed by: RADIOLOGY

## 2020-07-01 ENCOUNTER — HOSPITAL ENCOUNTER (OUTPATIENT)
Dept: CARDIOLOGY | Facility: HOSPITAL | Age: 45
Discharge: HOME | End: 2020-07-01
Attending: FAMILY MEDICINE
Payer: MEDICAID

## 2020-07-01 DIAGNOSIS — R07.89 ATYPICAL CHEST PAIN: ICD-10-CM

## 2020-07-01 LAB
CV STRESS BASE HR: 71 BPM
DIASTOLIC BLOOD PRESSURE: 87 MMHG
OHS CV CPX 85 PERCENT MAX PREDICTED HEART RATE MALE: 148
OHS CV CPX ESTIMATED METS: 9
OHS CV CPX MAX PREDICTED HEART RATE: 174
OHS CV CPX PATIENT IS FEMALE: 0
OHS CV CPX PATIENT IS MALE: 1
OHS CV CPX PEAK DIASTOLIC BLOOD PRESSURE: 90 MMHG
OHS CV CPX PEAK HEAR RATE: 162 BPM
OHS CV CPX PEAK RATE PRESSURE PRODUCT: NORMAL
OHS CV CPX PEAK SYSTOLIC BLOOD PRESSURE: 186 MMHG
OHS CV CPX PERCENT MAX PREDICTED HEART RATE ACHIEVED: 93
OHS CV CPX RATE PRESSURE PRODUCT PRESENTING: 9798
STRESS ANGINA INDEX: 0
STRESS ECHO POST EXERCISE DUR MIN: 7 MINUTES
SYSTOLIC BLOOD PRESSURE: 138 MMHG

## 2020-07-01 PROCEDURE — 93351 STRESS TTE COMPLETE: CPT | Mod: 26,,, | Performed by: INTERNAL MEDICINE

## 2020-07-01 PROCEDURE — 93351 STRESS TTE COMPLETE: CPT

## 2020-07-01 PROCEDURE — 93351 STRESS ECHO (CUPID ONLY): ICD-10-PCS | Mod: 26,,, | Performed by: INTERNAL MEDICINE

## 2020-07-14 ENCOUNTER — OFFICE VISIT (OUTPATIENT)
Dept: ORTHOPEDICS | Facility: CLINIC | Age: 45
End: 2020-07-14
Payer: MEDICAID

## 2020-07-14 DIAGNOSIS — M17.11 PRIMARY OSTEOARTHRITIS OF RIGHT KNEE: ICD-10-CM

## 2020-07-14 PROCEDURE — 99999 PR PBB SHADOW E&M-EST. PATIENT-LVL III: ICD-10-PCS | Mod: PBBFAC,,, | Performed by: ORTHOPAEDIC SURGERY

## 2020-07-14 PROCEDURE — 99213 OFFICE O/P EST LOW 20 MIN: CPT | Mod: PBBFAC,PN | Performed by: ORTHOPAEDIC SURGERY

## 2020-07-14 PROCEDURE — 99213 PR OFFICE/OUTPT VISIT, EST, LEVL III, 20-29 MIN: ICD-10-PCS | Mod: S$PBB,,, | Performed by: ORTHOPAEDIC SURGERY

## 2020-07-14 PROCEDURE — 99213 OFFICE O/P EST LOW 20 MIN: CPT | Mod: S$PBB,,, | Performed by: ORTHOPAEDIC SURGERY

## 2020-07-14 PROCEDURE — 99999 PR PBB SHADOW E&M-EST. PATIENT-LVL III: CPT | Mod: PBBFAC,,, | Performed by: ORTHOPAEDIC SURGERY

## 2020-07-14 RX ORDER — TRIAMCINOLONE ACETONIDE 40 MG/ML
40 INJECTION, SUSPENSION INTRA-ARTICULAR; INTRAMUSCULAR
Status: DISCONTINUED | OUTPATIENT
Start: 2020-07-14 | End: 2020-07-14 | Stop reason: HOSPADM

## 2020-07-14 RX ADMIN — TRIAMCINOLONE ACETONIDE 40 MG: 40 INJECTION, SUSPENSION INTRA-ARTICULAR; INTRAMUSCULAR at 03:07

## 2020-07-14 NOTE — PROCEDURES
Large Joint Aspiration/Injection    Date/Time: 7/14/2020 3:00 PM  Performed by: Larry Shabazz MD  Authorized by: Larry Shabazz MD     Medications:  40 mg triamcinolone acetonide 40 mg/mL     After obtaining verbal informed consent the patient's right knee was prepped aseptically and injected through an inferior lateral approach using 40 mg of triamcinolone and 1 cc of 1% plain Xylocaine.  The patient was warned about postinjection flare and how to manage it with ice, rest and over-the-counter analgesics.  They're advised to contact me for any severe, uncontrolled pain.

## 2020-07-14 NOTE — LETTER
July 14, 2020      Nuno White MD  [address] [facility] [address]           [facility]  [address]  [place] [address]  Phone: [phone]  Fax: [phone]          Patient: Shane Savage   MR Number: [id number]   YOB: 1975   Date of Visit: 7/14/2020       Dear Dr. Nuno White:    Thank you for referring Shane Savage to me for evaluation. Attached you will find relevant portions of my assessment and plan of care.    If you have questions, please do not hesitate to call me. I look forward to following hSane Savage along with you.    Sincerely,    Larry Shabazz MD    Enclosure  CC:  No Recipients    If you would like to receive this communication electronically, please contact [email or web address] or [phone] to request more information on [employer] Link access.    For providers and/or their staff who would like to refer a patient to [facility], please contact us through our one-stop-shop provider referral line, [facility], at [phone].    If you feel you have received this communication in error or would no longer like to receive these types of communications, please e-mail [email or web address]

## 2020-07-14 NOTE — PROGRESS NOTES
Subjective:      Patient ID: Shane Savage is a 44 y.o. male.    Chief Complaint: Pain of the Right Knee    HPI    Follow-up for knee pain.  Patient was injected 2 years ago with very good relief into the last month or so.  He attributes increased symptoms to extra time working.  Denies mechanical symptoms.  Notes posterior swelling      Review of Systems   Constitution: Negative for fever and weight loss.   HENT: Negative for congestion.    Eyes: Negative for visual disturbance.   Cardiovascular: Negative for chest pain.   Respiratory: Negative for shortness of breath.    Hematologic/Lymphatic: Negative for bleeding problem. Does not bruise/bleed easily.   Skin: Negative for poor wound healing.   Musculoskeletal: Positive for joint pain and joint swelling.   Gastrointestinal: Negative for abdominal pain.   Genitourinary: Negative for dysuria.   Neurological: Negative for seizures.   Psychiatric/Behavioral: Negative for altered mental status.   Allergic/Immunologic: Negative for persistent infections.         Objective:      Ortho/SPM Exam      Right knee    The patient is not in acute distress.   Body habitus is normal.   Sclera appear normal  No respiratory distress  The patient walks without a limp.  Resisted SLR negative.   The skin over the knee is intact.  Knee effusion 0  Tendernes is located absent.  Range of motion- Flexion full, Extension full.   Ligament exam:   MCL trace laxity   Lachman intact              Post sag intact    LCL intact  Patellar apprehension negative.  Popliteal cyst present  Patellar crepitation absent.  Flexion/pinch negative.  Pulses DP present, PT present.  Motor normal 5/5 strength in all tested muscle groups.   Sensory normal.    I reviewed the relevant imaging for the patient's condition:  There is very mild joint space narrowing and small osteophytes        Assessment:       Encounter Diagnosis   Name Primary?    Primary osteoarthritis of right knee         The condition is  structurally mild.  The Baker cyst in secondary.            Plan:       Shane was seen today for pain.    Diagnoses and all orders for this visit:    Primary osteoarthritis of right knee  -     Ambulatory referral/consult to Orthopedics          I explained my diagnostic impression and the reasoning behind it in detail, using layman's terms.  Models and/or pictures were used to help in the explanation.      Injection recommended and consent given    Patient advised to finish NSAIDs prescription from PMD    Drainage of the Baker cyst is possible, but I recommend that this be reserved only for very persistent symptoms as the procedure often fails to provide lasting benefit.

## 2020-07-24 DIAGNOSIS — I10 ESSENTIAL HYPERTENSION: ICD-10-CM

## 2020-07-24 RX ORDER — HYDROCHLOROTHIAZIDE 12.5 MG/1
TABLET ORAL
Qty: 90 TABLET | Refills: 0 | Status: SHIPPED | OUTPATIENT
Start: 2020-07-24 | End: 2020-11-04

## 2021-06-04 ENCOUNTER — OFFICE VISIT (OUTPATIENT)
Dept: FAMILY MEDICINE | Facility: CLINIC | Age: 46
End: 2021-06-04
Payer: MEDICAID

## 2021-06-04 VITALS
HEIGHT: 73 IN | DIASTOLIC BLOOD PRESSURE: 88 MMHG | BODY MASS INDEX: 41.75 KG/M2 | SYSTOLIC BLOOD PRESSURE: 132 MMHG | WEIGHT: 315 LBS | HEART RATE: 68 BPM | OXYGEN SATURATION: 98 %

## 2021-06-04 DIAGNOSIS — E66.01 SEVERE OBESITY (BMI >= 40): ICD-10-CM

## 2021-06-04 DIAGNOSIS — I10 ESSENTIAL HYPERTENSION: ICD-10-CM

## 2021-06-04 DIAGNOSIS — Z11.4 ENCOUNTER FOR SCREENING FOR HIV: ICD-10-CM

## 2021-06-04 DIAGNOSIS — Z12.5 SCREENING FOR PROSTATE CANCER: ICD-10-CM

## 2021-06-04 DIAGNOSIS — F17.200 SMOKER: ICD-10-CM

## 2021-06-04 DIAGNOSIS — Z00.00 ANNUAL PHYSICAL EXAM: Primary | ICD-10-CM

## 2021-06-04 DIAGNOSIS — Z11.59 ENCOUNTER FOR HCV SCREENING TEST FOR LOW RISK PATIENT: ICD-10-CM

## 2021-06-04 PROCEDURE — 99214 OFFICE O/P EST MOD 30 MIN: CPT | Mod: S$PBB,,, | Performed by: FAMILY MEDICINE

## 2021-06-04 PROCEDURE — 99999 PR PBB SHADOW E&M-EST. PATIENT-LVL III: ICD-10-PCS | Mod: PBBFAC,,, | Performed by: FAMILY MEDICINE

## 2021-06-04 PROCEDURE — 99999 PR PBB SHADOW E&M-EST. PATIENT-LVL III: CPT | Mod: PBBFAC,,, | Performed by: FAMILY MEDICINE

## 2021-06-04 PROCEDURE — 99213 OFFICE O/P EST LOW 20 MIN: CPT | Mod: PBBFAC,PO | Performed by: FAMILY MEDICINE

## 2021-06-04 PROCEDURE — 99214 PR OFFICE/OUTPT VISIT, EST, LEVL IV, 30-39 MIN: ICD-10-PCS | Mod: S$PBB,,, | Performed by: FAMILY MEDICINE

## 2021-06-04 RX ORDER — ASPIRIN/CALCIUM CARB/MAGNESIUM 325 MG
4 TABLET ORAL
Qty: 96 LOZENGE | Refills: 3 | Status: SHIPPED | OUTPATIENT
Start: 2021-06-04 | End: 2021-06-07

## 2021-06-07 ENCOUNTER — LAB VISIT (OUTPATIENT)
Dept: LAB | Facility: HOSPITAL | Age: 46
End: 2021-06-07
Attending: FAMILY MEDICINE
Payer: COMMERCIAL

## 2021-06-07 ENCOUNTER — TELEPHONE (OUTPATIENT)
Dept: INTERNAL MEDICINE | Facility: CLINIC | Age: 46
End: 2021-06-07

## 2021-06-07 DIAGNOSIS — F17.200 SMOKER: ICD-10-CM

## 2021-06-07 DIAGNOSIS — Z11.4 ENCOUNTER FOR SCREENING FOR HIV: ICD-10-CM

## 2021-06-07 DIAGNOSIS — I10 ESSENTIAL HYPERTENSION: ICD-10-CM

## 2021-06-07 DIAGNOSIS — Z12.5 SCREENING FOR PROSTATE CANCER: ICD-10-CM

## 2021-06-07 DIAGNOSIS — Z00.00 ANNUAL PHYSICAL EXAM: ICD-10-CM

## 2021-06-07 DIAGNOSIS — Z11.59 ENCOUNTER FOR HCV SCREENING TEST FOR LOW RISK PATIENT: ICD-10-CM

## 2021-06-07 LAB
ALBUMIN SERPL BCP-MCNC: 3.5 G/DL (ref 3.5–5.2)
ALP SERPL-CCNC: 46 U/L (ref 55–135)
ALT SERPL W/O P-5'-P-CCNC: 28 U/L (ref 10–44)
ANION GAP SERPL CALC-SCNC: 10 MMOL/L (ref 8–16)
AST SERPL-CCNC: 28 U/L (ref 10–40)
BASOPHILS # BLD AUTO: 0.02 K/UL (ref 0–0.2)
BASOPHILS NFR BLD: 0.4 % (ref 0–1.9)
BILIRUB SERPL-MCNC: 0.5 MG/DL (ref 0.1–1)
BUN SERPL-MCNC: 10 MG/DL (ref 6–20)
CALCIUM SERPL-MCNC: 9.3 MG/DL (ref 8.7–10.5)
CHLORIDE SERPL-SCNC: 107 MMOL/L (ref 95–110)
CHOLEST SERPL-MCNC: 134 MG/DL (ref 120–199)
CHOLEST/HDLC SERPL: 2.9 {RATIO} (ref 2–5)
CO2 SERPL-SCNC: 25 MMOL/L (ref 23–29)
COMPLEXED PSA SERPL-MCNC: 0.51 NG/ML (ref 0–4)
CREAT SERPL-MCNC: 1 MG/DL (ref 0.5–1.4)
DIFFERENTIAL METHOD: ABNORMAL
EOSINOPHIL # BLD AUTO: 0.2 K/UL (ref 0–0.5)
EOSINOPHIL NFR BLD: 3.4 % (ref 0–8)
ERYTHROCYTE [DISTWIDTH] IN BLOOD BY AUTOMATED COUNT: 13.2 % (ref 11.5–14.5)
EST. GFR  (AFRICAN AMERICAN): >60 ML/MIN/1.73 M^2
EST. GFR  (NON AFRICAN AMERICAN): >60 ML/MIN/1.73 M^2
GLUCOSE SERPL-MCNC: 92 MG/DL (ref 70–110)
HCT VFR BLD AUTO: 43 % (ref 40–54)
HCV AB SERPL QL IA: NEGATIVE
HDLC SERPL-MCNC: 47 MG/DL (ref 40–75)
HDLC SERPL: 35.1 % (ref 20–50)
HGB BLD-MCNC: 13.9 G/DL (ref 14–18)
HIV 1+2 AB+HIV1 P24 AG SERPL QL IA: NEGATIVE
IMM GRANULOCYTES # BLD AUTO: 0.02 K/UL (ref 0–0.04)
IMM GRANULOCYTES NFR BLD AUTO: 0.4 % (ref 0–0.5)
LDLC SERPL CALC-MCNC: 65.6 MG/DL (ref 63–159)
LYMPHOCYTES # BLD AUTO: 2.1 K/UL (ref 1–4.8)
LYMPHOCYTES NFR BLD: 44.8 % (ref 18–48)
MCH RBC QN AUTO: 32.3 PG (ref 27–31)
MCHC RBC AUTO-ENTMCNC: 32.3 G/DL (ref 32–36)
MCV RBC AUTO: 100 FL (ref 82–98)
MONOCYTES # BLD AUTO: 0.4 K/UL (ref 0.3–1)
MONOCYTES NFR BLD: 8.6 % (ref 4–15)
NEUTROPHILS # BLD AUTO: 2 K/UL (ref 1.8–7.7)
NEUTROPHILS NFR BLD: 42.4 % (ref 38–73)
NONHDLC SERPL-MCNC: 87 MG/DL
NRBC BLD-RTO: 0 /100 WBC
PLATELET # BLD AUTO: 255 K/UL (ref 150–450)
PMV BLD AUTO: 10.8 FL (ref 9.2–12.9)
POTASSIUM SERPL-SCNC: 4.2 MMOL/L (ref 3.5–5.1)
PROT SERPL-MCNC: 7 G/DL (ref 6–8.4)
RBC # BLD AUTO: 4.3 M/UL (ref 4.6–6.2)
SODIUM SERPL-SCNC: 142 MMOL/L (ref 136–145)
TRIGL SERPL-MCNC: 107 MG/DL (ref 30–150)
TSH SERPL DL<=0.005 MIU/L-ACNC: 1.43 UIU/ML (ref 0.4–4)
WBC # BLD AUTO: 4.67 K/UL (ref 3.9–12.7)

## 2021-06-07 PROCEDURE — 36415 COLL VENOUS BLD VENIPUNCTURE: CPT | Mod: PO | Performed by: FAMILY MEDICINE

## 2021-06-07 PROCEDURE — 84443 ASSAY THYROID STIM HORMONE: CPT | Performed by: FAMILY MEDICINE

## 2021-06-07 PROCEDURE — 85025 COMPLETE CBC W/AUTO DIFF WBC: CPT | Performed by: FAMILY MEDICINE

## 2021-06-07 PROCEDURE — 84153 ASSAY OF PSA TOTAL: CPT | Performed by: FAMILY MEDICINE

## 2021-06-07 PROCEDURE — 80061 LIPID PANEL: CPT | Performed by: FAMILY MEDICINE

## 2021-06-07 PROCEDURE — 80053 COMPREHEN METABOLIC PANEL: CPT | Performed by: FAMILY MEDICINE

## 2021-06-07 PROCEDURE — 86803 HEPATITIS C AB TEST: CPT | Performed by: FAMILY MEDICINE

## 2021-06-07 PROCEDURE — 86703 HIV-1/HIV-2 1 RESULT ANTBDY: CPT | Performed by: FAMILY MEDICINE

## 2021-06-07 RX ORDER — ASPIRIN/CALCIUM CARB/MAGNESIUM 325 MG
TABLET ORAL
Qty: 96 LOZENGE | Refills: 3 | Status: SHIPPED | OUTPATIENT
Start: 2021-06-07 | End: 2022-01-03

## 2021-10-15 ENCOUNTER — OFFICE VISIT (OUTPATIENT)
Dept: FAMILY MEDICINE | Facility: CLINIC | Age: 46
End: 2021-10-15
Payer: MEDICAID

## 2021-10-15 VITALS
SYSTOLIC BLOOD PRESSURE: 130 MMHG | DIASTOLIC BLOOD PRESSURE: 86 MMHG | WEIGHT: 315 LBS | OXYGEN SATURATION: 99 % | BODY MASS INDEX: 41.75 KG/M2 | HEIGHT: 73 IN | HEART RATE: 76 BPM

## 2021-10-15 DIAGNOSIS — R42 VERTIGO: Primary | ICD-10-CM

## 2021-10-15 PROCEDURE — 99999 PR PBB SHADOW E&M-EST. PATIENT-LVL III: CPT | Mod: PBBFAC,,, | Performed by: NURSE PRACTITIONER

## 2021-10-15 PROCEDURE — 99213 PR OFFICE/OUTPT VISIT, EST, LEVL III, 20-29 MIN: ICD-10-PCS | Mod: S$PBB,,, | Performed by: NURSE PRACTITIONER

## 2021-10-15 PROCEDURE — 99213 OFFICE O/P EST LOW 20 MIN: CPT | Mod: PBBFAC,PO | Performed by: NURSE PRACTITIONER

## 2021-10-15 PROCEDURE — 99213 OFFICE O/P EST LOW 20 MIN: CPT | Mod: S$PBB,,, | Performed by: NURSE PRACTITIONER

## 2021-10-15 PROCEDURE — 99999 PR PBB SHADOW E&M-EST. PATIENT-LVL III: ICD-10-PCS | Mod: PBBFAC,,, | Performed by: NURSE PRACTITIONER

## 2021-10-15 RX ORDER — MECLIZINE HYDROCHLORIDE 25 MG/1
25 TABLET ORAL 3 TIMES DAILY PRN
Qty: 30 TABLET | Refills: 1 | Status: SHIPPED | OUTPATIENT
Start: 2021-10-15 | End: 2022-07-28

## 2021-12-03 ENCOUNTER — OFFICE VISIT (OUTPATIENT)
Dept: FAMILY MEDICINE | Facility: CLINIC | Age: 46
End: 2021-12-03
Payer: COMMERCIAL

## 2021-12-03 VITALS
WEIGHT: 315 LBS | DIASTOLIC BLOOD PRESSURE: 98 MMHG | BODY MASS INDEX: 41.75 KG/M2 | SYSTOLIC BLOOD PRESSURE: 130 MMHG | HEART RATE: 60 BPM | OXYGEN SATURATION: 98 % | HEIGHT: 73 IN

## 2021-12-03 DIAGNOSIS — G89.29 CHRONIC LOW BACK PAIN, UNSPECIFIED BACK PAIN LATERALITY, UNSPECIFIED WHETHER SCIATICA PRESENT: ICD-10-CM

## 2021-12-03 DIAGNOSIS — I10 ESSENTIAL HYPERTENSION: Primary | ICD-10-CM

## 2021-12-03 DIAGNOSIS — M54.50 CHRONIC LOW BACK PAIN, UNSPECIFIED BACK PAIN LATERALITY, UNSPECIFIED WHETHER SCIATICA PRESENT: ICD-10-CM

## 2021-12-03 DIAGNOSIS — F17.200 CURRENT EVERY DAY SMOKER: ICD-10-CM

## 2021-12-03 DIAGNOSIS — E66.01 MORBID OBESITY WITH BMI OF 40.0-44.9, ADULT: ICD-10-CM

## 2021-12-03 DIAGNOSIS — Z13.1 SCREENING FOR DIABETES MELLITUS (DM): ICD-10-CM

## 2021-12-03 PROCEDURE — 99214 PR OFFICE/OUTPT VISIT, EST, LEVL IV, 30-39 MIN: ICD-10-PCS | Mod: S$GLB,,, | Performed by: NURSE PRACTITIONER

## 2021-12-03 PROCEDURE — 99999 PR PBB SHADOW E&M-EST. PATIENT-LVL IV: CPT | Mod: PBBFAC,,, | Performed by: NURSE PRACTITIONER

## 2021-12-03 PROCEDURE — 99214 OFFICE O/P EST MOD 30 MIN: CPT | Mod: S$GLB,,, | Performed by: NURSE PRACTITIONER

## 2021-12-03 PROCEDURE — 99999 PR PBB SHADOW E&M-EST. PATIENT-LVL IV: ICD-10-PCS | Mod: PBBFAC,,, | Performed by: NURSE PRACTITIONER

## 2021-12-03 RX ORDER — AMOXICILLIN 500 MG
1 CAPSULE ORAL DAILY
COMMUNITY

## 2021-12-03 RX ORDER — IBUPROFEN 100 MG/5ML
1000 SUSPENSION, ORAL (FINAL DOSE FORM) ORAL DAILY
COMMUNITY

## 2021-12-03 RX ORDER — HYDROCHLOROTHIAZIDE 12.5 MG/1
25 TABLET ORAL DAILY
Qty: 90 TABLET | Refills: 0
Start: 2021-12-03 | End: 2022-01-23 | Stop reason: SDUPTHER

## 2021-12-06 ENCOUNTER — LAB VISIT (OUTPATIENT)
Dept: LAB | Facility: HOSPITAL | Age: 46
End: 2021-12-06
Attending: NURSE PRACTITIONER
Payer: COMMERCIAL

## 2021-12-06 DIAGNOSIS — Z13.1 SCREENING FOR DIABETES MELLITUS (DM): ICD-10-CM

## 2021-12-06 DIAGNOSIS — I10 ESSENTIAL HYPERTENSION: ICD-10-CM

## 2021-12-06 LAB
ALBUMIN SERPL BCP-MCNC: 3.7 G/DL (ref 3.5–5.2)
ALP SERPL-CCNC: 48 U/L (ref 55–135)
ALT SERPL W/O P-5'-P-CCNC: 31 U/L (ref 10–44)
ANION GAP SERPL CALC-SCNC: 11 MMOL/L (ref 8–16)
AST SERPL-CCNC: 30 U/L (ref 10–40)
BASOPHILS # BLD AUTO: 0.02 K/UL (ref 0–0.2)
BASOPHILS NFR BLD: 0.4 % (ref 0–1.9)
BILIRUB SERPL-MCNC: 0.7 MG/DL (ref 0.1–1)
BUN SERPL-MCNC: 14 MG/DL (ref 6–20)
CALCIUM SERPL-MCNC: 9.5 MG/DL (ref 8.7–10.5)
CHLORIDE SERPL-SCNC: 102 MMOL/L (ref 95–110)
CHOLEST SERPL-MCNC: 137 MG/DL (ref 120–199)
CHOLEST/HDLC SERPL: 2.3 {RATIO} (ref 2–5)
CO2 SERPL-SCNC: 25 MMOL/L (ref 23–29)
CREAT SERPL-MCNC: 1.1 MG/DL (ref 0.5–1.4)
DIFFERENTIAL METHOD: ABNORMAL
EOSINOPHIL # BLD AUTO: 0.1 K/UL (ref 0–0.5)
EOSINOPHIL NFR BLD: 2.2 % (ref 0–8)
ERYTHROCYTE [DISTWIDTH] IN BLOOD BY AUTOMATED COUNT: 13.2 % (ref 11.5–14.5)
EST. GFR  (AFRICAN AMERICAN): >60 ML/MIN/1.73 M^2
EST. GFR  (NON AFRICAN AMERICAN): >60 ML/MIN/1.73 M^2
ESTIMATED AVG GLUCOSE: 114 MG/DL (ref 68–131)
GLUCOSE SERPL-MCNC: 88 MG/DL (ref 70–110)
HBA1C MFR BLD: 5.6 % (ref 4–5.6)
HCT VFR BLD AUTO: 44 % (ref 40–54)
HDLC SERPL-MCNC: 59 MG/DL (ref 40–75)
HDLC SERPL: 43.1 % (ref 20–50)
HGB BLD-MCNC: 14.4 G/DL (ref 14–18)
IMM GRANULOCYTES # BLD AUTO: 0.01 K/UL (ref 0–0.04)
IMM GRANULOCYTES NFR BLD AUTO: 0.2 % (ref 0–0.5)
LDLC SERPL CALC-MCNC: 58 MG/DL (ref 63–159)
LYMPHOCYTES # BLD AUTO: 2.1 K/UL (ref 1–4.8)
LYMPHOCYTES NFR BLD: 41.5 % (ref 18–48)
MCH RBC QN AUTO: 32.4 PG (ref 27–31)
MCHC RBC AUTO-ENTMCNC: 32.7 G/DL (ref 32–36)
MCV RBC AUTO: 99 FL (ref 82–98)
MONOCYTES # BLD AUTO: 0.5 K/UL (ref 0.3–1)
MONOCYTES NFR BLD: 9.8 % (ref 4–15)
NEUTROPHILS # BLD AUTO: 2.3 K/UL (ref 1.8–7.7)
NEUTROPHILS NFR BLD: 45.9 % (ref 38–73)
NONHDLC SERPL-MCNC: 78 MG/DL
NRBC BLD-RTO: 0 /100 WBC
PLATELET # BLD AUTO: 262 K/UL (ref 150–450)
PMV BLD AUTO: 10.5 FL (ref 9.2–12.9)
POTASSIUM SERPL-SCNC: 4.1 MMOL/L (ref 3.5–5.1)
PROT SERPL-MCNC: 7 G/DL (ref 6–8.4)
RBC # BLD AUTO: 4.44 M/UL (ref 4.6–6.2)
SODIUM SERPL-SCNC: 138 MMOL/L (ref 136–145)
TRIGL SERPL-MCNC: 100 MG/DL (ref 30–150)
TSH SERPL DL<=0.005 MIU/L-ACNC: 2.1 UIU/ML (ref 0.4–4)
WBC # BLD AUTO: 4.99 K/UL (ref 3.9–12.7)

## 2021-12-06 PROCEDURE — 80061 LIPID PANEL: CPT | Performed by: NURSE PRACTITIONER

## 2021-12-06 PROCEDURE — 85025 COMPLETE CBC W/AUTO DIFF WBC: CPT | Performed by: NURSE PRACTITIONER

## 2021-12-06 PROCEDURE — 84443 ASSAY THYROID STIM HORMONE: CPT | Performed by: NURSE PRACTITIONER

## 2021-12-06 PROCEDURE — 83036 HEMOGLOBIN GLYCOSYLATED A1C: CPT | Performed by: NURSE PRACTITIONER

## 2021-12-06 PROCEDURE — 80053 COMPREHEN METABOLIC PANEL: CPT | Performed by: NURSE PRACTITIONER

## 2021-12-06 PROCEDURE — 36415 COLL VENOUS BLD VENIPUNCTURE: CPT | Mod: PO | Performed by: NURSE PRACTITIONER

## 2022-01-03 ENCOUNTER — OFFICE VISIT (OUTPATIENT)
Dept: FAMILY MEDICINE | Facility: CLINIC | Age: 47
End: 2022-01-03
Payer: COMMERCIAL

## 2022-01-03 VITALS
WEIGHT: 315 LBS | SYSTOLIC BLOOD PRESSURE: 138 MMHG | DIASTOLIC BLOOD PRESSURE: 76 MMHG | HEART RATE: 67 BPM | BODY MASS INDEX: 41.75 KG/M2 | HEIGHT: 73 IN | OXYGEN SATURATION: 98 %

## 2022-01-03 DIAGNOSIS — U07.1 COVID-19 VIRUS DETECTED: ICD-10-CM

## 2022-01-03 DIAGNOSIS — F17.200 CURRENT EVERY DAY SMOKER: ICD-10-CM

## 2022-01-03 DIAGNOSIS — M54.50 CHRONIC LOW BACK PAIN, UNSPECIFIED BACK PAIN LATERALITY, UNSPECIFIED WHETHER SCIATICA PRESENT: ICD-10-CM

## 2022-01-03 DIAGNOSIS — E66.01 MORBID OBESITY WITH BMI OF 40.0-44.9, ADULT: ICD-10-CM

## 2022-01-03 DIAGNOSIS — I10 ESSENTIAL HYPERTENSION: Primary | ICD-10-CM

## 2022-01-03 DIAGNOSIS — R52 GENERALIZED BODY ACHES: ICD-10-CM

## 2022-01-03 DIAGNOSIS — G89.29 CHRONIC LOW BACK PAIN, UNSPECIFIED BACK PAIN LATERALITY, UNSPECIFIED WHETHER SCIATICA PRESENT: ICD-10-CM

## 2022-01-03 DIAGNOSIS — R06.02 SHORTNESS OF BREATH: ICD-10-CM

## 2022-01-03 DIAGNOSIS — J06.9 URI, ACUTE: ICD-10-CM

## 2022-01-03 LAB
CTP QC/QA: YES
CTP QC/QA: YES
FLUAV AG NPH QL: NEGATIVE
FLUBV AG NPH QL: NEGATIVE
SARS-COV-2 RDRP RESP QL NAA+PROBE: POSITIVE

## 2022-01-03 PROCEDURE — 3075F SYST BP GE 130 - 139MM HG: CPT | Mod: CPTII,S$GLB,, | Performed by: NURSE PRACTITIONER

## 2022-01-03 PROCEDURE — 3008F PR BODY MASS INDEX (BMI) DOCUMENTED: ICD-10-PCS | Mod: CPTII,S$GLB,, | Performed by: NURSE PRACTITIONER

## 2022-01-03 PROCEDURE — 3008F BODY MASS INDEX DOCD: CPT | Mod: CPTII,S$GLB,, | Performed by: NURSE PRACTITIONER

## 2022-01-03 PROCEDURE — U0002 COVID-19 LAB TEST NON-CDC: HCPCS | Mod: QW,S$GLB,, | Performed by: NURSE PRACTITIONER

## 2022-01-03 PROCEDURE — 99999 PR PBB SHADOW E&M-EST. PATIENT-LVL IV: CPT | Mod: PBBFAC,,, | Performed by: NURSE PRACTITIONER

## 2022-01-03 PROCEDURE — 1160F RVW MEDS BY RX/DR IN RCRD: CPT | Mod: CPTII,S$GLB,, | Performed by: NURSE PRACTITIONER

## 2022-01-03 PROCEDURE — 87804 INFLUENZA ASSAY W/OPTIC: CPT | Mod: QW,S$GLB,, | Performed by: NURSE PRACTITIONER

## 2022-01-03 PROCEDURE — 3078F PR MOST RECENT DIASTOLIC BLOOD PRESSURE < 80 MM HG: ICD-10-PCS | Mod: CPTII,S$GLB,, | Performed by: NURSE PRACTITIONER

## 2022-01-03 PROCEDURE — 3075F PR MOST RECENT SYSTOLIC BLOOD PRESS GE 130-139MM HG: ICD-10-PCS | Mod: CPTII,S$GLB,, | Performed by: NURSE PRACTITIONER

## 2022-01-03 PROCEDURE — 99214 OFFICE O/P EST MOD 30 MIN: CPT | Mod: S$GLB,,, | Performed by: NURSE PRACTITIONER

## 2022-01-03 PROCEDURE — U0002: ICD-10-PCS | Mod: QW,S$GLB,, | Performed by: NURSE PRACTITIONER

## 2022-01-03 PROCEDURE — 87804 POCT INFLUENZA A/B: ICD-10-PCS | Mod: 59,QW,S$GLB, | Performed by: NURSE PRACTITIONER

## 2022-01-03 PROCEDURE — 3078F DIAST BP <80 MM HG: CPT | Mod: CPTII,S$GLB,, | Performed by: NURSE PRACTITIONER

## 2022-01-03 PROCEDURE — 1159F PR MEDICATION LIST DOCUMENTED IN MEDICAL RECORD: ICD-10-PCS | Mod: CPTII,S$GLB,, | Performed by: NURSE PRACTITIONER

## 2022-01-03 PROCEDURE — 1159F MED LIST DOCD IN RCRD: CPT | Mod: CPTII,S$GLB,, | Performed by: NURSE PRACTITIONER

## 2022-01-03 PROCEDURE — 99214 PR OFFICE/OUTPT VISIT, EST, LEVL IV, 30-39 MIN: ICD-10-PCS | Mod: S$GLB,,, | Performed by: NURSE PRACTITIONER

## 2022-01-03 PROCEDURE — 99999 PR PBB SHADOW E&M-EST. PATIENT-LVL IV: ICD-10-PCS | Mod: PBBFAC,,, | Performed by: NURSE PRACTITIONER

## 2022-01-03 PROCEDURE — 1160F PR REVIEW ALL MEDS BY PRESCRIBER/CLIN PHARMACIST DOCUMENTED: ICD-10-PCS | Mod: CPTII,S$GLB,, | Performed by: NURSE PRACTITIONER

## 2022-01-03 RX ORDER — ALBUTEROL SULFATE 90 UG/1
2 AEROSOL, METERED RESPIRATORY (INHALATION) EVERY 6 HOURS PRN
Qty: 18 G | Refills: 0 | Status: SHIPPED | OUTPATIENT
Start: 2022-01-03 | End: 2022-07-28

## 2022-01-03 NOTE — PATIENT INSTRUCTIONS
Your test was POSITIVE for COVID-19 (coronavirus).       Please isolate yourself at home.  You may leave home and/or return to work once the following conditions are met:    If you were not hospitalized and are not moderately to severely immunocompromised:    More than 5 days since symptoms first appeared AND   More than 24 hours fever free without medications AND   Symptoms are improving   Continue to wear a mask around others for 5 additional days.    If you were hospitalized OR are moderately to severely immunocompromised:   More than 20 days since symptoms first appeared   More than 24 hours fever free without medications   Symptoms have improved    If you had no symptoms but tested positive:   More than 5 days since the date of the first positive test (20 days if moderately to severely immunocompromised). If you develop symptoms, then use the guidelines above.   Continue to wear a mask around others for 5 additional days.      Contact Tracing    As one of the next steps, you will receive a call or text from the [place] Department [facility] ([facility]) COVID-19 Tracing Team. See the contact information below so you know not to ignore the health departments call. It is important that you contact them back immediately so they can help.      Contact Tracer Number:  [phone]  Caller ID for most carriers: [facility]     What is contact tracing?  · Contact tracing is a process that helps identify everyone who has been in close contact with an infected person. Contact tracers let those people know they may have been exposed and guide them on next steps. Confidentiality is important for everyone; no one will be told who may have exposed them to the virus.  · Your involvement is important. The more we know about where and how this virus is spreading, the better chance we have at stopping it from spreading further.  What does exposure mean?  · Exposure means you have been within 6 feet for more than 15  minutes with a person who has or had COVID-19.  What kind of questions do the contact tracers ask?  · A contact tracer will confirm your basic contact information including name, address, phone number, and next of kin, as well as asking about any symptoms you may have had. Theyll also ask you how you think you may have gotten sick, such as places where you may have been exposed to the virus, and people you were with. Those names will never be shared with anyone outside of that call, and will only be used to help trace and stop the spread of the virus.   I have privacy concerns. How will the state use my information?  · Your privacy about your health is important. All calls are completed using call centers that use the appropriate health privacy protection measures (HIPAA compliance), meaning that your patient information is safe. No one will ever ask you any questions related to immigration status. Your health comes first.   Do I have to participate?  · You do not have to participate, but we strongly encourage you to. Contact tracing can help us catch and control new outbreaks as theyre developing to keep your friends and family safe.   What if I dont hear from anyone?  · If you dont receive a call within 24 hours, you can call the number above right away to inquire about your status. That line is open from 8:00 am - 8:00 p.m., 7 days a week.  Contact tracing saves lives! Together, we have the power to beat this virus and keep our loved ones and neighbors safe.    For more information see CDC link below.      https://www.cdc.gov/coronavirus/2019-ncov/hcp/guidance-prevent-spread.html#precautions        Sources:  [facility], [place] Department [facility] and \[facility]\""           Sincerely,     Selin Ovalle NP

## 2022-01-03 NOTE — PROGRESS NOTES
Subjective:       Patient ID: Shane Savage is a 46 y.o. male.    Chief Complaint: Hypertension    This is a pleasant 45 yo male patient of Dr. Coreas who is known to me. He presents today for a 1-month HTN follow-up. PMH includes    Patient Active Problem List:     Morbid obesity with BMI of 40.0-44.9, adult     Lumbar facet arthropathy     Patellofemoral syndrome     Marijuana user     Decreased range of motion (ROM) of right knee     Decreased strength     Decreased mobility     Essential hypertension     Fatigue     Plantar fasciitis     Chronic lower back pain    VSS today. Patient was seen by me last month; was noted to have elevated BP. HCTZ was increased to 25mg daily and reports improvement with BP. Denies chest pain, dyspnea, palpitations, visual changes, HA, dizziness, or N/V. Compliant with current medication regimen. Limiting sodium in diet. Has not had a cigarette in 3 weeks. Plans to continue holding off on smoking.    Patient reports he started experiencing symptoms that include dry cough, body aches, runny nose, SOB with exertion that quickly resolves, and diarrhea x 4 days that has resolved. Symptoms started 5 days ago. Reports his girlfriend has been having similar symptoms and her coworkers have tested positive for COVID-19. Has been taking OTC Mucinex DM and DayQuil with minimal relief. Vaccinated against COVID-19 as of June 2021. Has not received flu vaccine yet this season.     Review of Systems   Constitutional: Negative for appetite change, chills, fatigue and fever.   HENT: Positive for rhinorrhea. Negative for ear discharge, ear pain, sinus pressure/congestion, sore throat and trouble swallowing.    Eyes: Negative for visual disturbance.   Respiratory: Positive for cough and shortness of breath (intermittent). Negative for chest tightness.    Cardiovascular: Negative for chest pain, palpitations and leg swelling.   Gastrointestinal: Positive for diarrhea. Negative for abdominal pain,  nausea and vomiting.   Genitourinary: Negative for difficulty urinating.   Musculoskeletal: Positive for myalgias. Negative for gait problem.   Integumentary:  Negative for rash.   Neurological: Negative for weakness, headaches, disturbances in coordination and coordination difficulties.         Objective:      Physical Exam  Vitals reviewed.   Constitutional:       General: He is awake. He is not in acute distress.Vital signs are normal.      Appearance: Normal appearance. He is well-developed, well-groomed and well-nourished. He is morbidly obese.   HENT:      Head: Normocephalic and atraumatic.      Right Ear: Hearing, tympanic membrane, ear canal and external ear normal.      Left Ear: Hearing, tympanic membrane, ear canal and external ear normal.      Nose: Rhinorrhea present.      Mouth/Throat:      Mouth: Oropharynx is clear and moist and mucous membranes are normal. Mucous membranes are moist.      Pharynx: Oropharynx is clear. Uvula midline. No posterior oropharyngeal erythema.   Eyes:      General: Lids are normal.         Right eye: No discharge.         Left eye: No discharge.      Extraocular Movements: Extraocular movements intact and EOM normal.      Pupils: Pupils are equal, round, and reactive to light.   Neck:      Vascular: No carotid bruit.      Trachea: Trachea normal.   Cardiovascular:      Rate and Rhythm: Normal rate and regular rhythm.      Pulses: Normal pulses and intact distal pulses.           Radial pulses are 2+ on the right side and 2+ on the left side.      Heart sounds: Normal heart sounds, S1 normal and S2 normal. No murmur heard.      Pulmonary:      Effort: Pulmonary effort is normal. No respiratory distress.      Breath sounds: Normal breath sounds. No wheezing or rhonchi.   Abdominal:      General: Bowel sounds are normal. There is no distension.      Palpations: Abdomen is soft.      Tenderness: There is no abdominal tenderness. There is no guarding.   Musculoskeletal:          General: Normal range of motion.      Cervical back: Full passive range of motion without pain, normal range of motion and neck supple.      Left lower leg: No edema.   Lymphadenopathy:      Cervical: No cervical adenopathy.   Skin:     General: Skin is warm, dry and intact.      Capillary Refill: Capillary refill takes less than 2 seconds.      Coloration: Skin is not pale.   Neurological:      Mental Status: He is alert and oriented to person, place, and time.      Coordination: Coordination normal.      Gait: Gait normal.      Deep Tendon Reflexes: Strength normal and reflexes are normal and symmetric.   Psychiatric:         Attention and Perception: Attention normal.         Mood and Affect: Mood and affect and mood normal.         Speech: Speech normal.         Behavior: Behavior normal. Behavior is cooperative.         Thought Content: Thought content normal.         Cognition and Memory: Cognition normal.         Assessment:       Problem List Items Addressed This Visit        Cardiac/Vascular    Essential hypertension - Primary       Endocrine    Morbid obesity with BMI of 40.0-44.9, adult       Orthopedic    Chronic lower back pain      Other Visit Diagnoses     Current every day smoker        Shortness of breath        Relevant Medications    albuterol (VENTOLIN HFA) 90 mcg/actuation inhaler    URI, acute        Generalized body aches        Relevant Orders    POCT COVID-19 Rapid Screening    POCT Influenza A/B          Plan:       Shane was seen today for hypertension.    Diagnoses and all orders for this visit:    Essential hypertension    Current every day smoker    Chronic low back pain, unspecified back pain laterality, unspecified whether sciatica present    Shortness of breath  -     albuterol (VENTOLIN HFA) 90 mcg/actuation inhaler; Inhale 2 puffs into the lungs every 6 (six) hours as needed for Wheezing. Rescue    URI, acute    Generalized body aches  -     POCT COVID-19 Rapid Screening  -      POCT Influenza A/B    Morbid obesity with BMI of 40.0-44.9, adult          - Continue with current treatment plan  - Encouraged patient to continue to eat a low salt/low fat diet and discussed importance of engaging in physical activity at least 5x/week for a minimum of 30 min/day  - COVID & Flu testing done today: flu is negative, COVID-19 POSITIVE  - Drink plenty fluids and eat as tolerated  - May continue using OTC treatments for symptoms  - Use albuterol inhaler up to QID PRN SOB  - Strict quarantine instructions given  - Warning signs discussed  - RTC in 3 months for HTN follow-up, or sooner if needed        I spent a total of 30 minutes on the day of the visit.  This includes face to face time and non-face to face time preparing to see the patient (eg, review of tests), obtaining and/or reviewing separately obtained history, documenting clinical information in the electronic or other health record, independently interpreting results and communicating results to the patient/family/caregiver, or care coordinator.

## 2022-01-28 ENCOUNTER — TELEPHONE (OUTPATIENT)
Dept: FAMILY MEDICINE | Facility: CLINIC | Age: 47
End: 2022-01-28
Payer: COMMERCIAL

## 2022-01-28 DIAGNOSIS — I10 ESSENTIAL HYPERTENSION: ICD-10-CM

## 2022-01-28 RX ORDER — HYDROCHLOROTHIAZIDE 12.5 MG/1
12.5 TABLET ORAL DAILY
Qty: 90 TABLET | Refills: 3 | Status: SHIPPED | OUTPATIENT
Start: 2022-01-28 | End: 2022-01-31 | Stop reason: SDUPTHER

## 2022-01-28 NOTE — TELEPHONE ENCOUNTER
----- Message from Yodit Lee MA sent at 1/28/2022 10:56 AM CST -----  Regarding: Please advise  Contact: [phone]    ----- Message -----  From: Alexandra Collado  Sent: 1/28/2022  10:17 AM CST  To: [facility] Staff    Who Called: pt   Regarding: called stating rx was increase the doses on rx hydroCHLOROthiazide (HYDRODIURIL) 12.5 MG Tab. Please send the new prescription    Would the patient rather a call back or a response via [facility]? Call back  Best Call Back Number: [phone]  Additional Information:

## 2022-01-31 DIAGNOSIS — I10 ESSENTIAL HYPERTENSION: ICD-10-CM

## 2022-01-31 RX ORDER — HYDROCHLOROTHIAZIDE 12.5 MG/1
12.5 TABLET ORAL DAILY
Qty: 90 TABLET | Refills: 3 | Status: SHIPPED | OUTPATIENT
Start: 2022-01-31 | End: 2022-04-04

## 2022-01-31 NOTE — TELEPHONE ENCOUNTER
No new care gaps identified.  Powered by [employer]. Reference number: [id number].   1/31/2022 9:28:41 AM CST

## 2022-03-21 ENCOUNTER — PATIENT MESSAGE (OUTPATIENT)
Dept: ADMINISTRATIVE | Facility: HOSPITAL | Age: 47
End: 2022-03-21
Payer: COMMERCIAL

## 2022-04-04 ENCOUNTER — OFFICE VISIT (OUTPATIENT)
Dept: FAMILY MEDICINE | Facility: CLINIC | Age: 47
End: 2022-04-04
Payer: COMMERCIAL

## 2022-04-04 ENCOUNTER — PATIENT OUTREACH (OUTPATIENT)
Dept: ADMINISTRATIVE | Facility: HOSPITAL | Age: 47
End: 2022-04-04
Payer: COMMERCIAL

## 2022-04-04 VITALS
HEIGHT: 73 IN | DIASTOLIC BLOOD PRESSURE: 98 MMHG | WEIGHT: 315 LBS | OXYGEN SATURATION: 98 % | HEART RATE: 61 BPM | BODY MASS INDEX: 41.75 KG/M2 | SYSTOLIC BLOOD PRESSURE: 150 MMHG

## 2022-04-04 DIAGNOSIS — I10 ESSENTIAL HYPERTENSION: Primary | ICD-10-CM

## 2022-04-04 DIAGNOSIS — E66.01 MORBID OBESITY WITH BMI OF 40.0-44.9, ADULT: ICD-10-CM

## 2022-04-04 DIAGNOSIS — F32.A MILD DEPRESSION: ICD-10-CM

## 2022-04-04 DIAGNOSIS — Z12.11 SCREENING FOR COLON CANCER: ICD-10-CM

## 2022-04-04 DIAGNOSIS — Z12.11 SCREENING FOR COLON CANCER: Primary | ICD-10-CM

## 2022-04-04 PROCEDURE — 3077F PR MOST RECENT SYSTOLIC BLOOD PRESSURE >= 140 MM HG: ICD-10-PCS | Mod: CPTII,S$GLB,, | Performed by: NURSE PRACTITIONER

## 2022-04-04 PROCEDURE — 99214 PR OFFICE/OUTPT VISIT, EST, LEVL IV, 30-39 MIN: ICD-10-PCS | Mod: S$GLB,,, | Performed by: NURSE PRACTITIONER

## 2022-04-04 PROCEDURE — 1159F PR MEDICATION LIST DOCUMENTED IN MEDICAL RECORD: ICD-10-PCS | Mod: CPTII,S$GLB,, | Performed by: NURSE PRACTITIONER

## 2022-04-04 PROCEDURE — 3080F DIAST BP >= 90 MM HG: CPT | Mod: CPTII,S$GLB,, | Performed by: NURSE PRACTITIONER

## 2022-04-04 PROCEDURE — 3008F BODY MASS INDEX DOCD: CPT | Mod: CPTII,S$GLB,, | Performed by: NURSE PRACTITIONER

## 2022-04-04 PROCEDURE — 3008F PR BODY MASS INDEX (BMI) DOCUMENTED: ICD-10-PCS | Mod: CPTII,S$GLB,, | Performed by: NURSE PRACTITIONER

## 2022-04-04 PROCEDURE — 3080F PR MOST RECENT DIASTOLIC BLOOD PRESSURE >= 90 MM HG: ICD-10-PCS | Mod: CPTII,S$GLB,, | Performed by: NURSE PRACTITIONER

## 2022-04-04 PROCEDURE — 3077F SYST BP >= 140 MM HG: CPT | Mod: CPTII,S$GLB,, | Performed by: NURSE PRACTITIONER

## 2022-04-04 PROCEDURE — 99214 OFFICE O/P EST MOD 30 MIN: CPT | Mod: S$GLB,,, | Performed by: NURSE PRACTITIONER

## 2022-04-04 PROCEDURE — 1159F MED LIST DOCD IN RCRD: CPT | Mod: CPTII,S$GLB,, | Performed by: NURSE PRACTITIONER

## 2022-04-04 PROCEDURE — 99999 PR PBB SHADOW E&M-EST. PATIENT-LVL V: ICD-10-PCS | Mod: PBBFAC,,, | Performed by: NURSE PRACTITIONER

## 2022-04-04 PROCEDURE — 1160F PR REVIEW ALL MEDS BY PRESCRIBER/CLIN PHARMACIST DOCUMENTED: ICD-10-PCS | Mod: CPTII,S$GLB,, | Performed by: NURSE PRACTITIONER

## 2022-04-04 PROCEDURE — 99999 PR PBB SHADOW E&M-EST. PATIENT-LVL V: CPT | Mod: PBBFAC,,, | Performed by: NURSE PRACTITIONER

## 2022-04-04 PROCEDURE — 1160F RVW MEDS BY RX/DR IN RCRD: CPT | Mod: CPTII,S$GLB,, | Performed by: NURSE PRACTITIONER

## 2022-04-04 RX ORDER — HYDROCHLOROTHIAZIDE 25 MG/1
25 TABLET ORAL DAILY
Qty: 90 TABLET | Refills: 1 | Status: SHIPPED | OUTPATIENT
Start: 2022-04-04 | End: 2023-01-19 | Stop reason: SDUPTHER

## 2022-04-04 NOTE — PROGRESS NOTES
Chart audit performed Care everywhere triggered & updated LINKS triggered and updated Patient outreach regarding Health Maintenance- states he will  fitkit in the morning - staff notified

## 2022-04-04 NOTE — PROGRESS NOTES
Subjective:       Patient ID: Shane Savage is a 46 y.o. male.    Chief Complaint: Follow-up and Hypertension    This is a 45 yo male patient of Dr. Coreas who is known to me. He presents today for a HTN follow-up. PMH includes    Patient Active Problem List:     Morbid obesity with BMI of 40.0-44.9, adult     Lumbar facet arthropathy     Patellofemoral syndrome     Marijuana user     Decreased range of motion (ROM) of right knee     Decreased strength     Decreased mobility     Essential hypertension     Fatigue     Plantar fasciitis     Chronic lower back pain    BP elevated today, slightly improved with recheck but still elevated. Denies chest pain, SOB, dyspnea, palpitations, visual changes, HA, dizziness, or N/V/D. Currently taking HCTZ 12.5mg daily; had been advised to increase dose to 25mg daily but says his insurance would not cover an increased dose so he continued to take 12.5mg dose. Admits to having poor diet. Not limiting sodium; has been eating a lot of boiled seafood lately. Drinks alcohol 3-4 times weekly, about 5-6 beers each time. Starting to exercise twice/week for 30 min each time. Drinks about 4 bottles of water daily. Recently started another part-time job that includes more physical work and has suffered with some intermittent back pain. Lifting heavy objects; not using lumbar support. Quit smoking cigarettes in December 2021 but continues to vape daily. Still enduring some emotional distress, lost child's mom in October, PHQ-2 score of 1 today.     Review of Systems   Constitutional: Negative for appetite change, chills and fever.   HENT: Negative for trouble swallowing.    Eyes: Negative for redness and visual disturbance.   Respiratory: Negative for cough, chest tightness and shortness of breath.    Cardiovascular: Negative for chest pain, palpitations and leg swelling.   Gastrointestinal: Negative for blood in stool, nausea and vomiting.   Genitourinary: Negative for difficulty urinating  and hematuria.   Musculoskeletal: Positive for arthralgias (intermittent) and back pain (intermittent). Negative for gait problem.   Neurological: Negative for weakness, numbness, disturbances in coordination, memory loss and coordination difficulties.   Psychiatric/Behavioral: Negative for suicidal ideas.         Objective:      Physical Exam  Vitals reviewed.   Constitutional:       General: He is awake. He is not in acute distress.     Appearance: Normal appearance. He is well-developed and well-groomed. He is morbidly obese.   HENT:      Head: Normocephalic and atraumatic.      Right Ear: External ear normal.      Left Ear: External ear normal.   Eyes:      General:         Right eye: No discharge.         Left eye: No discharge.      Extraocular Movements: Extraocular movements intact.      Pupils: Pupils are equal, round, and reactive to light.   Neck:      Vascular: No carotid bruit.   Cardiovascular:      Rate and Rhythm: Normal rate and regular rhythm.      Pulses:           Carotid pulses are 2+ on the right side and 2+ on the left side.       Radial pulses are 2+ on the right side and 2+ on the left side.      Heart sounds: Normal heart sounds, S1 normal and S2 normal. No murmur heard.  Pulmonary:      Effort: Pulmonary effort is normal. No respiratory distress.      Breath sounds: No wheezing or rhonchi.   Abdominal:      General: There is no distension.   Musculoskeletal:      Right lower leg: No edema.      Left lower leg: No edema.   Lymphadenopathy:      Cervical: No cervical adenopathy.   Skin:     General: Skin is warm and dry.      Coloration: Skin is not pale.   Neurological:      Mental Status: He is alert and oriented to person, place, and time.      Coordination: Coordination normal.      Gait: Gait normal.   Psychiatric:         Attention and Perception: Attention normal.         Mood and Affect: Mood and affect normal.         Speech: Speech normal.         Behavior: Behavior normal.  Behavior is cooperative.         Thought Content: Thought content normal.         Cognition and Memory: Cognition normal.         Assessment:       Problem List Items Addressed This Visit        Cardiac/Vascular    Essential hypertension - Primary    Relevant Medications    hydroCHLOROthiazide (HYDRODIURIL) 25 MG tablet       Endocrine    Morbid obesity with BMI of 40.0-44.9, adult      Other Visit Diagnoses     Mild depression        Relevant Orders    Ambulatory referral/consult to Psychiatry    Screening for colon cancer        Relevant Orders    Case Request Endoscopy: Colonoscopy (Completed)          Plan:       Shane was seen today for follow-up and hypertension.    Diagnoses and all orders for this visit:    Essential hypertension  -     hydroCHLOROthiazide (HYDRODIURIL) 25 MG tablet; Take 1 tablet (25 mg total) by mouth once daily.    Mild depression  -     Ambulatory referral/consult to Psychiatry; Future    Screening for colon cancer  -     Case Request Endoscopy: Colonoscopy    Morbid obesity with BMI of 40.0-44.9, adult          - Increase HCTZ to 25mg daily  - Encouraged to eat a low salt/low fat diet and discussed importance of engaging in physical activity at least 5x/week for a minimum of 30 min/day  - Discussed proper diet at length  - Reduce alcohol intake  - Quit vaping! Discussed Smoking Cessation referral; will re-address in 2 weeks  - Follow up with Psychiatry  - RTC in 2 weeks for a HTN follow-up, or sooner if needed        I spent a total of 30 minutes on the day of the visit.  This includes face to face time and non-face to face time preparing to see the patient (eg, review of tests), obtaining and/or reviewing separately obtained history, documenting clinical information in the electronic or other health record, independently interpreting results and communicating results to the patient/family/caregiver, or care coordinator.

## 2022-04-18 ENCOUNTER — LAB VISIT (OUTPATIENT)
Dept: LAB | Facility: HOSPITAL | Age: 47
End: 2022-04-18
Attending: FAMILY MEDICINE
Payer: COMMERCIAL

## 2022-04-18 ENCOUNTER — OFFICE VISIT (OUTPATIENT)
Dept: FAMILY MEDICINE | Facility: CLINIC | Age: 47
End: 2022-04-18
Payer: COMMERCIAL

## 2022-04-18 VITALS
OXYGEN SATURATION: 98 % | HEIGHT: 73 IN | WEIGHT: 315 LBS | SYSTOLIC BLOOD PRESSURE: 128 MMHG | HEART RATE: 80 BPM | DIASTOLIC BLOOD PRESSURE: 80 MMHG | BODY MASS INDEX: 41.75 KG/M2

## 2022-04-18 DIAGNOSIS — F32.A MILD DEPRESSION: ICD-10-CM

## 2022-04-18 DIAGNOSIS — Z12.11 SCREENING FOR COLON CANCER: ICD-10-CM

## 2022-04-18 DIAGNOSIS — E66.01 MORBID OBESITY WITH BMI OF 40.0-44.9, ADULT: ICD-10-CM

## 2022-04-18 DIAGNOSIS — R10.13 EPIGASTRIC PAIN: ICD-10-CM

## 2022-04-18 DIAGNOSIS — B35.1 ONYCHOMYCOSIS: ICD-10-CM

## 2022-04-18 DIAGNOSIS — I10 ESSENTIAL HYPERTENSION: Primary | ICD-10-CM

## 2022-04-18 PROCEDURE — 3079F DIAST BP 80-89 MM HG: CPT | Mod: CPTII,S$GLB,, | Performed by: NURSE PRACTITIONER

## 2022-04-18 PROCEDURE — 3074F SYST BP LT 130 MM HG: CPT | Mod: CPTII,S$GLB,, | Performed by: NURSE PRACTITIONER

## 2022-04-18 PROCEDURE — 1159F MED LIST DOCD IN RCRD: CPT | Mod: CPTII,S$GLB,, | Performed by: NURSE PRACTITIONER

## 2022-04-18 PROCEDURE — 1159F PR MEDICATION LIST DOCUMENTED IN MEDICAL RECORD: ICD-10-PCS | Mod: CPTII,S$GLB,, | Performed by: NURSE PRACTITIONER

## 2022-04-18 PROCEDURE — 3074F PR MOST RECENT SYSTOLIC BLOOD PRESSURE < 130 MM HG: ICD-10-PCS | Mod: CPTII,S$GLB,, | Performed by: NURSE PRACTITIONER

## 2022-04-18 PROCEDURE — 99999 PR PBB SHADOW E&M-EST. PATIENT-LVL IV: CPT | Mod: PBBFAC,,, | Performed by: NURSE PRACTITIONER

## 2022-04-18 PROCEDURE — 1160F PR REVIEW ALL MEDS BY PRESCRIBER/CLIN PHARMACIST DOCUMENTED: ICD-10-PCS | Mod: CPTII,S$GLB,, | Performed by: NURSE PRACTITIONER

## 2022-04-18 PROCEDURE — 3008F BODY MASS INDEX DOCD: CPT | Mod: CPTII,S$GLB,, | Performed by: NURSE PRACTITIONER

## 2022-04-18 PROCEDURE — 99214 OFFICE O/P EST MOD 30 MIN: CPT | Mod: S$GLB,,, | Performed by: NURSE PRACTITIONER

## 2022-04-18 PROCEDURE — 3079F PR MOST RECENT DIASTOLIC BLOOD PRESSURE 80-89 MM HG: ICD-10-PCS | Mod: CPTII,S$GLB,, | Performed by: NURSE PRACTITIONER

## 2022-04-18 PROCEDURE — 82274 ASSAY TEST FOR BLOOD FECAL: CPT | Performed by: FAMILY MEDICINE

## 2022-04-18 PROCEDURE — 99999 PR PBB SHADOW E&M-EST. PATIENT-LVL IV: ICD-10-PCS | Mod: PBBFAC,,, | Performed by: NURSE PRACTITIONER

## 2022-04-18 PROCEDURE — 3008F PR BODY MASS INDEX (BMI) DOCUMENTED: ICD-10-PCS | Mod: CPTII,S$GLB,, | Performed by: NURSE PRACTITIONER

## 2022-04-18 PROCEDURE — 1160F RVW MEDS BY RX/DR IN RCRD: CPT | Mod: CPTII,S$GLB,, | Performed by: NURSE PRACTITIONER

## 2022-04-18 PROCEDURE — 99214 PR OFFICE/OUTPT VISIT, EST, LEVL IV, 30-39 MIN: ICD-10-PCS | Mod: S$GLB,,, | Performed by: NURSE PRACTITIONER

## 2022-04-18 RX ORDER — TERBINAFINE HYDROCHLORIDE 250 MG/1
250 TABLET ORAL DAILY
Qty: 84 TABLET | Refills: 0 | Status: SHIPPED | OUTPATIENT
Start: 2022-04-18 | End: 2022-07-11

## 2022-04-18 NOTE — PROGRESS NOTES
Subjective:       Patient ID: Shane Savage is a 46 y.o. male.    Chief Complaint: Hypertension and Follow-up    This is a 45 yo male patient of Dr. Coreas who is known to me. He presents today for a HTN follow-up. PMH includes     Patient Active Problem List:     Morbid obesity with BMI of 40.0-44.9, adult     Lumbar facet arthropathy     Patellofemoral syndrome     Marijuana user     Decreased range of motion (ROM) of right knee     Decreased strength     Decreased mobility     Essential hypertension     Fatigue     Plantar fasciitis     Chronic lower back pain    VSS today. Was seen by me 2 weeks ago with elevated BPs. Increased HCTZ to 25mg daily. Presents today for a follow-up. BPs are much better. Trying to limit sodium. Still drinking alcohol, trying to reduce. Exercising twice weekly for 30 minutes each time. Drinks about 4 bottles of water daily. Quit smoking cigarettes in December 2021 but continues to vape daily. Still having mild epigastric pain that comes and goes. Tolerating fluids and meals. Mentions he has been suffering with toe nail fungus for several months. Tried OTC topical that slightly improved nail discoloration on some toe nails. No other issues or complaints today.    Review of Systems   Constitutional: Negative for appetite change, chills and fever.   HENT: Negative for trouble swallowing.    Respiratory: Negative for cough, chest tightness and shortness of breath.    Cardiovascular: Negative for chest pain, palpitations and leg swelling.   Gastrointestinal: Positive for abdominal pain (epigastric, intermittent). Negative for blood in stool, diarrhea, nausea and vomiting.   Genitourinary: Negative for difficulty urinating.   Musculoskeletal: Negative for gait problem.   Integumentary:  Positive for color change (toe nail).   Neurological: Negative for disturbances in coordination and coordination difficulties.         Objective:      Physical Exam  Vitals reviewed.   Constitutional:        General: He is awake. He is not in acute distress.     Appearance: Normal appearance. He is well-developed and well-groomed. He is morbidly obese.   HENT:      Head: Normocephalic and atraumatic.      Right Ear: External ear normal.      Left Ear: External ear normal.   Eyes:      General:         Right eye: No discharge.         Left eye: No discharge.   Neck:      Vascular: No carotid bruit.   Cardiovascular:      Rate and Rhythm: Normal rate and regular rhythm.      Pulses:           Carotid pulses are 2+ on the right side and 2+ on the left side.       Radial pulses are 2+ on the right side and 2+ on the left side.        Dorsalis pedis pulses are 2+ on the right side and 2+ on the left side.      Heart sounds: Normal heart sounds, S1 normal and S2 normal. No murmur heard.  Pulmonary:      Effort: Pulmonary effort is normal. No respiratory distress.      Breath sounds: No wheezing.   Abdominal:      General: There is no distension.   Musculoskeletal:      Right lower leg: No edema.      Left lower leg: No edema.   Feet:      Right foot:      Toenail Condition: Fungal disease present.     Left foot:      Toenail Condition: Fungal disease present.  Lymphadenopathy:      Cervical: No cervical adenopathy.   Skin:     General: Skin is warm.      Capillary Refill: Capillary refill takes less than 2 seconds.      Coloration: Skin is not pale.   Neurological:      Mental Status: He is alert and oriented to person, place, and time.      Gait: Gait normal.   Psychiatric:         Attention and Perception: Attention normal.         Mood and Affect: Mood and affect normal.         Speech: Speech normal.         Behavior: Behavior normal. Behavior is cooperative.         Thought Content: Thought content normal.         Assessment:       Problem List Items Addressed This Visit        Cardiac/Vascular    Essential hypertension - Primary    Relevant Orders    Comprehensive Metabolic Panel    Lipid Panel    CBC Auto Differential     TSH    Microalbumin/Creatinine Ratio, Urine       Endocrine    Morbid obesity with BMI of 40.0-44.9, adult    Relevant Orders    Lipid Panel      Other Visit Diagnoses     Mild depression        Epigastric pain        Relevant Orders    Lipase    Amylase    H. PYLORI ANTIBODY, IGG    Onychomycosis        Relevant Medications    terbinafine HCL (LAMISIL) 250 mg tablet    Other Relevant Orders    Hepatic Function Panel          Plan:       Shane was seen today for hypertension and follow-up.    Diagnoses and all orders for this visit:    Essential hypertension  -     Comprehensive Metabolic Panel; Future  -     Lipid Panel; Future  -     CBC Auto Differential; Future  -     TSH; Future  -     Microalbumin/Creatinine Ratio, Urine; Future    Mild depression    Epigastric pain  -     Lipase; Future  -     Amylase; Future  -     H. PYLORI ANTIBODY, IGG; Future    Onychomycosis  -     terbinafine HCL (LAMISIL) 250 mg tablet; Take 1 tablet (250 mg total) by mouth once daily. for 84 doses  -     Hepatic Function Panel; Future    Morbid obesity with BMI of 40.0-44.9, adult  -     Lipid Panel; Future          - Labs ordered today  - Take terbinafine once daily for the next 12 weeks; will repeat LFTs in 1 month and again in 2 months  - Encouraged patient to continue to eat a low salt/low fat diet and discussed importance of engaging in physical activity at least 5x/week for a minimum of 30 min/day  - Decrease alcohol intake, increase water intake  - Warning signs discussed  - RTC in 3 months for HTN follow-up, or sooner if needed        I spent a total of 30 minutes on the day of the visit.  This includes face to face time and non-face to face time preparing to see the patient (eg, review of tests), obtaining and/or reviewing separately obtained history, documenting clinical information in the electronic or other health record, independently interpreting results and communicating results to the patient/family/caregiver, or  care coordinator.

## 2022-04-19 ENCOUNTER — LAB VISIT (OUTPATIENT)
Dept: LAB | Facility: HOSPITAL | Age: 47
End: 2022-04-19
Attending: NURSE PRACTITIONER
Payer: COMMERCIAL

## 2022-04-19 DIAGNOSIS — I10 ESSENTIAL HYPERTENSION: ICD-10-CM

## 2022-04-19 DIAGNOSIS — E66.01 MORBID OBESITY WITH BMI OF 40.0-44.9, ADULT: ICD-10-CM

## 2022-04-19 DIAGNOSIS — R10.13 EPIGASTRIC PAIN: ICD-10-CM

## 2022-04-19 LAB
ALBUMIN SERPL BCP-MCNC: 3.6 G/DL (ref 3.5–5.2)
ALP SERPL-CCNC: 43 U/L (ref 55–135)
ALT SERPL W/O P-5'-P-CCNC: 29 U/L (ref 10–44)
AMYLASE SERPL-CCNC: 67 U/L (ref 20–110)
ANION GAP SERPL CALC-SCNC: 10 MMOL/L (ref 8–16)
AST SERPL-CCNC: 36 U/L (ref 10–40)
BASOPHILS # BLD AUTO: 0.02 K/UL (ref 0–0.2)
BASOPHILS NFR BLD: 0.4 % (ref 0–1.9)
BILIRUB SERPL-MCNC: 0.6 MG/DL (ref 0.1–1)
BUN SERPL-MCNC: 10 MG/DL (ref 6–20)
CALCIUM SERPL-MCNC: 9.5 MG/DL (ref 8.7–10.5)
CHLORIDE SERPL-SCNC: 103 MMOL/L (ref 95–110)
CHOLEST SERPL-MCNC: 153 MG/DL (ref 120–199)
CHOLEST/HDLC SERPL: 2.8 {RATIO} (ref 2–5)
CO2 SERPL-SCNC: 26 MMOL/L (ref 23–29)
CREAT SERPL-MCNC: 1 MG/DL (ref 0.5–1.4)
DIFFERENTIAL METHOD: ABNORMAL
EOSINOPHIL # BLD AUTO: 0.1 K/UL (ref 0–0.5)
EOSINOPHIL NFR BLD: 3.1 % (ref 0–8)
ERYTHROCYTE [DISTWIDTH] IN BLOOD BY AUTOMATED COUNT: 13.2 % (ref 11.5–14.5)
EST. GFR  (AFRICAN AMERICAN): >60 ML/MIN/1.73 M^2
EST. GFR  (NON AFRICAN AMERICAN): >60 ML/MIN/1.73 M^2
GLUCOSE SERPL-MCNC: 79 MG/DL (ref 70–110)
HCT VFR BLD AUTO: 42 % (ref 40–54)
HDLC SERPL-MCNC: 54 MG/DL (ref 40–75)
HDLC SERPL: 35.3 % (ref 20–50)
HEMOCCULT STL QL IA: NEGATIVE
HGB BLD-MCNC: 13.6 G/DL (ref 14–18)
IMM GRANULOCYTES # BLD AUTO: 0.01 K/UL (ref 0–0.04)
IMM GRANULOCYTES NFR BLD AUTO: 0.2 % (ref 0–0.5)
LDLC SERPL CALC-MCNC: 72.4 MG/DL (ref 63–159)
LIPASE SERPL-CCNC: 20 U/L (ref 4–60)
LYMPHOCYTES # BLD AUTO: 1.9 K/UL (ref 1–4.8)
LYMPHOCYTES NFR BLD: 42.2 % (ref 18–48)
MCH RBC QN AUTO: 32.2 PG (ref 27–31)
MCHC RBC AUTO-ENTMCNC: 32.4 G/DL (ref 32–36)
MCV RBC AUTO: 99 FL (ref 82–98)
MONOCYTES # BLD AUTO: 0.5 K/UL (ref 0.3–1)
MONOCYTES NFR BLD: 11.6 % (ref 4–15)
NEUTROPHILS # BLD AUTO: 1.9 K/UL (ref 1.8–7.7)
NEUTROPHILS NFR BLD: 42.5 % (ref 38–73)
NONHDLC SERPL-MCNC: 99 MG/DL
NRBC BLD-RTO: 0 /100 WBC
PLATELET # BLD AUTO: 267 K/UL (ref 150–450)
PMV BLD AUTO: 10.7 FL (ref 9.2–12.9)
POTASSIUM SERPL-SCNC: 4.1 MMOL/L (ref 3.5–5.1)
PROT SERPL-MCNC: 7 G/DL (ref 6–8.4)
RBC # BLD AUTO: 4.23 M/UL (ref 4.6–6.2)
SODIUM SERPL-SCNC: 139 MMOL/L (ref 136–145)
TRIGL SERPL-MCNC: 133 MG/DL (ref 30–150)
TSH SERPL DL<=0.005 MIU/L-ACNC: 2.2 UIU/ML (ref 0.4–4)
WBC # BLD AUTO: 4.55 K/UL (ref 3.9–12.7)

## 2022-04-19 PROCEDURE — 80053 COMPREHEN METABOLIC PANEL: CPT | Performed by: NURSE PRACTITIONER

## 2022-04-19 PROCEDURE — 84443 ASSAY THYROID STIM HORMONE: CPT | Performed by: NURSE PRACTITIONER

## 2022-04-19 PROCEDURE — 82150 ASSAY OF AMYLASE: CPT | Performed by: NURSE PRACTITIONER

## 2022-04-19 PROCEDURE — 83690 ASSAY OF LIPASE: CPT | Performed by: NURSE PRACTITIONER

## 2022-04-19 PROCEDURE — 80061 LIPID PANEL: CPT | Performed by: NURSE PRACTITIONER

## 2022-04-19 PROCEDURE — 85025 COMPLETE CBC W/AUTO DIFF WBC: CPT | Performed by: NURSE PRACTITIONER

## 2022-04-19 PROCEDURE — 86677 HELICOBACTER PYLORI ANTIBODY: CPT | Performed by: NURSE PRACTITIONER

## 2022-04-19 PROCEDURE — 36415 COLL VENOUS BLD VENIPUNCTURE: CPT | Mod: PO | Performed by: NURSE PRACTITIONER

## 2022-04-20 ENCOUNTER — PATIENT MESSAGE (OUTPATIENT)
Dept: FAMILY MEDICINE | Facility: CLINIC | Age: 47
End: 2022-04-20
Payer: COMMERCIAL

## 2022-04-20 DIAGNOSIS — A04.8 HELICOBACTER PYLORI INFECTION: Primary | ICD-10-CM

## 2022-04-20 LAB — H PYLORI IGG SERPL QL IA: POSITIVE

## 2022-04-20 RX ORDER — AMOXICILLIN 500 MG/1
1000 CAPSULE ORAL EVERY 12 HOURS
Qty: 56 CAPSULE | Refills: 0 | Status: SHIPPED | OUTPATIENT
Start: 2022-04-20 | End: 2022-05-04

## 2022-04-20 RX ORDER — CLARITHROMYCIN 500 MG/1
500 TABLET, FILM COATED ORAL EVERY 12 HOURS
Qty: 28 TABLET | Refills: 0 | Status: SHIPPED | OUTPATIENT
Start: 2022-04-20 | End: 2022-05-04

## 2022-04-20 RX ORDER — OMEPRAZOLE 20 MG/1
20 CAPSULE, DELAYED RELEASE ORAL 2 TIMES DAILY
Qty: 40 CAPSULE | Refills: 1 | Status: SHIPPED | OUTPATIENT
Start: 2022-04-20 | End: 2022-07-28

## 2022-04-20 NOTE — TELEPHONE ENCOUNTER
Notified of positive H. Pylori test. Discussed treatment in detail and sent to preferred pharmacy. Notified that h. Pylori stool test of cure to be done 6 weeks after completion of treatment. Verbalized understanding. No other questions at this time.      Selin Ovalle, NP

## 2022-05-02 ENCOUNTER — TELEPHONE (OUTPATIENT)
Dept: ENDOSCOPY | Facility: HOSPITAL | Age: 47
End: 2022-05-02
Payer: COMMERCIAL

## 2022-05-02 RX ORDER — SODIUM, POTASSIUM,MAG SULFATES 17.5-3.13G
1 SOLUTION, RECONSTITUTED, ORAL ORAL DAILY
Qty: 1 KIT | Refills: 0 | Status: SHIPPED | OUTPATIENT
Start: 2022-05-02 | End: 2022-05-04

## 2022-05-02 NOTE — TELEPHONE ENCOUNTER
Endoscopy Scheduling Questionnaire:         1. Are you taking any blood thinners? N               If Yes  Have you been on them for longer than one year?     2. Have you been diagnosed with Diverticulitis in past three months?  N    3. Are you on dialysis or have Kidney Disease? N    4. Previous Colonoscopy?  N         If yes Do you have a history of colon polyps?        6. Are you a diabetic?  N    7. Do you have a history of constipation?  N    Procedure scheduled with Dr. MEDINA  on  05/06/2022    The prep being used is SUPREP     The patient's prep instructions were sent by [employer]

## 2022-05-04 ENCOUNTER — TELEPHONE (OUTPATIENT)
Dept: ENDOSCOPY | Facility: HOSPITAL | Age: 47
End: 2022-05-04
Payer: COMMERCIAL

## 2022-05-04 NOTE — TELEPHONE ENCOUNTER
Spoke with patient about arrival time @ 0900.   Covid test = vacc    Prep instructions reviewed: the day before the procedure, follow a clear liquid diet all day, then start the first 1/2 of prep at 5pm and take 2nd 1/2 of prep @ 0400.  Pt must be completely NPO when prep completed @ 0600.              Medications: Do not take Insulin or oral diabetic medications the day of the procedure.  Take as prescribed: heart, seizure and blood pressure medication in the morning with a sip of water (less than an ounce).  Take any breathing medications and bring inhalers to hospital with you Leave all valuables and jewelry at home.     Wear comfortable clothes to procedure to change into hospital gown You cannot drive for 24 hours after your procedure because you will receive sedation for your procedure to make you comfortable.  A ride must be provided at discharge.

## 2022-05-06 ENCOUNTER — ANESTHESIA EVENT (OUTPATIENT)
Dept: ENDOSCOPY | Facility: HOSPITAL | Age: 47
End: 2022-05-06
Payer: COMMERCIAL

## 2022-05-06 ENCOUNTER — ANESTHESIA (OUTPATIENT)
Dept: ENDOSCOPY | Facility: HOSPITAL | Age: 47
End: 2022-05-06
Payer: COMMERCIAL

## 2022-05-06 ENCOUNTER — TELEPHONE (OUTPATIENT)
Dept: ENDOSCOPY | Facility: HOSPITAL | Age: 47
End: 2022-05-06
Payer: COMMERCIAL

## 2022-05-06 DIAGNOSIS — Z12.11 SCREENING FOR COLON CANCER: Primary | ICD-10-CM

## 2022-05-06 RX ORDER — SODIUM, POTASSIUM,MAG SULFATES 17.5-3.13G
1 SOLUTION, RECONSTITUTED, ORAL ORAL DAILY
Qty: 1 KIT | Refills: 0 | Status: SHIPPED | OUTPATIENT
Start: 2022-05-06 | End: 2022-05-08

## 2022-05-06 NOTE — ANESTHESIA POSTPROCEDURE EVALUATION
Anesthesia Post Evaluation    Patient: Shane Savage    Procedure(s) Performed: Procedure(s) (LRB):  Colonoscopy (N/A)    Final Anesthesia Type: MAC      Patient location during evaluation: PACU  Patient participation: Yes- Able to Participate  Level of consciousness: awake and alert  Post-procedure vital signs: reviewed and stable  Pain management: adequate  Airway patency: patent    PONV status at discharge: No PONV  Anesthetic complications: no      Cardiovascular status: blood pressure returned to baseline and hemodynamically stable  Respiratory status: unassisted  Hydration status: euvolemic  Follow-up not needed.          Vitals Value Taken Time   BP  05/06/22 1852   Temp  05/06/22 1852   Pulse  05/06/22 1852   Resp  05/06/22 1852   SpO2  05/06/22 1852         No case tracking events are documented in the log.      Pain/Priyank Score: No data recorded

## 2022-05-06 NOTE — ANESTHESIA PREPROCEDURE EVALUATION
05/06/2022  Shane Savage is a 46 y.o., male.    Past Medical History:   Diagnosis Date    Arthritis     Hypertension      Past Surgical History:   Procedure Laterality Date    gastic bypass           Pre-op Assessment    I have reviewed the Patient Summary Reports.     I have reviewed the Nursing Notes. I have reviewed the NPO Status.   I have reviewed the Medications.     Review of Systems  Anesthesia Hx:  No problems with previous Anesthesia    Cardiovascular:   Hypertension    Pulmonary:  Pulmonary Normal    Hepatic/GI:  Hepatic/GI Normal    Neurological:  Neurology Normal    Endocrine:  Endocrine Normal        Physical Exam  General: Well nourished, Cooperative, Alert and Oriented    Airway:  Mallampati: II   Mouth Opening: Normal  TM Distance: Normal  Tongue: Normal    Chest/Lungs:  Clear to auscultation, Normal Respiratory Rate    Heart:  Rate: Normal  Rhythm: Regular Rhythm  Sounds: Normal        Anesthesia Plan  Type of Anesthesia, risks & benefits discussed:    Anesthesia Type: MAC  Intra-op Monitoring Plan: Standard ASA Monitors  Post Op Pain Control Plan: multimodal analgesia  Induction:  IV  ASA Score: 2    Ready For Surgery From Anesthesia Perspective.     .

## 2022-05-18 ENCOUNTER — LAB VISIT (OUTPATIENT)
Dept: LAB | Facility: HOSPITAL | Age: 47
End: 2022-05-18
Attending: NURSE PRACTITIONER
Payer: COMMERCIAL

## 2022-05-18 ENCOUNTER — PATIENT MESSAGE (OUTPATIENT)
Dept: FAMILY MEDICINE | Facility: CLINIC | Age: 47
End: 2022-05-18
Payer: COMMERCIAL

## 2022-05-18 ENCOUNTER — TELEPHONE (OUTPATIENT)
Dept: FAMILY MEDICINE | Facility: CLINIC | Age: 47
End: 2022-05-18
Payer: COMMERCIAL

## 2022-05-18 DIAGNOSIS — B35.1 ONYCHOMYCOSIS: Primary | ICD-10-CM

## 2022-05-18 DIAGNOSIS — B35.1 ONYCHOMYCOSIS: ICD-10-CM

## 2022-05-18 LAB
ALBUMIN SERPL BCP-MCNC: 3.5 G/DL (ref 3.5–5.2)
ALP SERPL-CCNC: 47 U/L (ref 55–135)
ALT SERPL W/O P-5'-P-CCNC: 29 U/L (ref 10–44)
AST SERPL-CCNC: 33 U/L (ref 10–40)
BILIRUB DIRECT SERPL-MCNC: 0.2 MG/DL (ref 0.1–0.3)
BILIRUB SERPL-MCNC: 0.5 MG/DL (ref 0.1–1)
PROT SERPL-MCNC: 6.4 G/DL (ref 6–8.4)

## 2022-05-18 PROCEDURE — 80076 HEPATIC FUNCTION PANEL: CPT | Performed by: NURSE PRACTITIONER

## 2022-05-18 PROCEDURE — 36415 COLL VENOUS BLD VENIPUNCTURE: CPT | Mod: PO | Performed by: NURSE PRACTITIONER

## 2022-05-18 NOTE — TELEPHONE ENCOUNTER
LFTs remain normal. Will repeat hepatic panel in 1 month. Results sent via Portal.    Selin Ovalle NP

## 2022-05-19 ENCOUNTER — TELEPHONE (OUTPATIENT)
Dept: ENDOSCOPY | Facility: HOSPITAL | Age: 47
End: 2022-05-19
Payer: COMMERCIAL

## 2022-06-17 ENCOUNTER — LAB VISIT (OUTPATIENT)
Dept: LAB | Facility: HOSPITAL | Age: 47
End: 2022-06-17
Attending: NURSE PRACTITIONER
Payer: COMMERCIAL

## 2022-06-17 DIAGNOSIS — B35.1 ONYCHOMYCOSIS: ICD-10-CM

## 2022-06-17 LAB
ALBUMIN SERPL BCP-MCNC: 3.7 G/DL (ref 3.5–5.2)
ALP SERPL-CCNC: 42 U/L (ref 55–135)
ALT SERPL W/O P-5'-P-CCNC: 30 U/L (ref 10–44)
AST SERPL-CCNC: 32 U/L (ref 10–40)
BILIRUB DIRECT SERPL-MCNC: 0.3 MG/DL (ref 0.1–0.3)
BILIRUB SERPL-MCNC: 0.6 MG/DL (ref 0.1–1)
PROT SERPL-MCNC: 6.6 G/DL (ref 6–8.4)

## 2022-06-17 PROCEDURE — 36415 COLL VENOUS BLD VENIPUNCTURE: CPT | Mod: PO | Performed by: NURSE PRACTITIONER

## 2022-06-17 PROCEDURE — 80076 HEPATIC FUNCTION PANEL: CPT | Performed by: NURSE PRACTITIONER

## 2022-06-28 ENCOUNTER — LAB VISIT (OUTPATIENT)
Dept: LAB | Facility: HOSPITAL | Age: 47
End: 2022-06-28
Attending: NURSE PRACTITIONER
Payer: COMMERCIAL

## 2022-06-28 DIAGNOSIS — A04.8 HELICOBACTER PYLORI INFECTION: ICD-10-CM

## 2022-06-28 PROCEDURE — 87338 HPYLORI STOOL AG IA: CPT | Performed by: NURSE PRACTITIONER

## 2022-07-05 LAB
H PYLORI AG STL QL IA: NOT DETECTED
SPECIMEN SOURCE: NORMAL

## 2022-07-20 ENCOUNTER — PATIENT MESSAGE (OUTPATIENT)
Dept: FAMILY MEDICINE | Facility: CLINIC | Age: 47
End: 2022-07-20
Payer: COMMERCIAL

## 2022-07-28 ENCOUNTER — OFFICE VISIT (OUTPATIENT)
Dept: FAMILY MEDICINE | Facility: CLINIC | Age: 47
End: 2022-07-28
Payer: COMMERCIAL

## 2022-07-28 VITALS
DIASTOLIC BLOOD PRESSURE: 88 MMHG | SYSTOLIC BLOOD PRESSURE: 146 MMHG | WEIGHT: 315 LBS | BODY MASS INDEX: 41.75 KG/M2 | HEART RATE: 62 BPM | HEIGHT: 73 IN | OXYGEN SATURATION: 98 %

## 2022-07-28 DIAGNOSIS — J06.9 VIRAL UPPER RESPIRATORY ILLNESS: ICD-10-CM

## 2022-07-28 DIAGNOSIS — E66.01 MORBID OBESITY WITH BMI OF 40.0-44.9, ADULT: ICD-10-CM

## 2022-07-28 DIAGNOSIS — M54.42 ACUTE LEFT-SIDED LOW BACK PAIN WITH LEFT-SIDED SCIATICA: ICD-10-CM

## 2022-07-28 DIAGNOSIS — R05.9 COUGH: ICD-10-CM

## 2022-07-28 DIAGNOSIS — I10 ESSENTIAL HYPERTENSION: Primary | ICD-10-CM

## 2022-07-28 LAB
CTP QC/QA: YES
SARS-COV-2 RDRP RESP QL NAA+PROBE: NEGATIVE

## 2022-07-28 PROCEDURE — 3079F DIAST BP 80-89 MM HG: CPT | Mod: CPTII,S$GLB,, | Performed by: NURSE PRACTITIONER

## 2022-07-28 PROCEDURE — U0002 COVID-19 LAB TEST NON-CDC: HCPCS | Mod: QW,S$GLB,, | Performed by: NURSE PRACTITIONER

## 2022-07-28 PROCEDURE — 99214 OFFICE O/P EST MOD 30 MIN: CPT | Mod: S$GLB,,, | Performed by: NURSE PRACTITIONER

## 2022-07-28 PROCEDURE — 3077F SYST BP >= 140 MM HG: CPT | Mod: CPTII,S$GLB,, | Performed by: NURSE PRACTITIONER

## 2022-07-28 PROCEDURE — 3008F PR BODY MASS INDEX (BMI) DOCUMENTED: ICD-10-PCS | Mod: CPTII,S$GLB,, | Performed by: NURSE PRACTITIONER

## 2022-07-28 PROCEDURE — 1160F PR REVIEW ALL MEDS BY PRESCRIBER/CLIN PHARMACIST DOCUMENTED: ICD-10-PCS | Mod: CPTII,S$GLB,, | Performed by: NURSE PRACTITIONER

## 2022-07-28 PROCEDURE — 3061F PR NEG MICROALBUMINURIA RESULT DOCUMENTED/REVIEW: ICD-10-PCS | Mod: CPTII,S$GLB,, | Performed by: NURSE PRACTITIONER

## 2022-07-28 PROCEDURE — 99999 PR PBB SHADOW E&M-EST. PATIENT-LVL IV: CPT | Mod: PBBFAC,,, | Performed by: NURSE PRACTITIONER

## 2022-07-28 PROCEDURE — 1159F MED LIST DOCD IN RCRD: CPT | Mod: CPTII,S$GLB,, | Performed by: NURSE PRACTITIONER

## 2022-07-28 PROCEDURE — 3066F NEPHROPATHY DOC TX: CPT | Mod: CPTII,S$GLB,, | Performed by: NURSE PRACTITIONER

## 2022-07-28 PROCEDURE — U0002: ICD-10-PCS | Mod: QW,S$GLB,, | Performed by: NURSE PRACTITIONER

## 2022-07-28 PROCEDURE — 3079F PR MOST RECENT DIASTOLIC BLOOD PRESSURE 80-89 MM HG: ICD-10-PCS | Mod: CPTII,S$GLB,, | Performed by: NURSE PRACTITIONER

## 2022-07-28 PROCEDURE — 99999 PR PBB SHADOW E&M-EST. PATIENT-LVL IV: ICD-10-PCS | Mod: PBBFAC,,, | Performed by: NURSE PRACTITIONER

## 2022-07-28 PROCEDURE — 3077F PR MOST RECENT SYSTOLIC BLOOD PRESSURE >= 140 MM HG: ICD-10-PCS | Mod: CPTII,S$GLB,, | Performed by: NURSE PRACTITIONER

## 2022-07-28 PROCEDURE — 1160F RVW MEDS BY RX/DR IN RCRD: CPT | Mod: CPTII,S$GLB,, | Performed by: NURSE PRACTITIONER

## 2022-07-28 PROCEDURE — 3008F BODY MASS INDEX DOCD: CPT | Mod: CPTII,S$GLB,, | Performed by: NURSE PRACTITIONER

## 2022-07-28 PROCEDURE — 1159F PR MEDICATION LIST DOCUMENTED IN MEDICAL RECORD: ICD-10-PCS | Mod: CPTII,S$GLB,, | Performed by: NURSE PRACTITIONER

## 2022-07-28 PROCEDURE — 99214 PR OFFICE/OUTPT VISIT, EST, LEVL IV, 30-39 MIN: ICD-10-PCS | Mod: S$GLB,,, | Performed by: NURSE PRACTITIONER

## 2022-07-28 PROCEDURE — 3066F PR DOCUMENTATION OF TREATMENT FOR NEPHROPATHY: ICD-10-PCS | Mod: CPTII,S$GLB,, | Performed by: NURSE PRACTITIONER

## 2022-07-28 PROCEDURE — 3061F NEG MICROALBUMINURIA REV: CPT | Mod: CPTII,S$GLB,, | Performed by: NURSE PRACTITIONER

## 2022-07-28 RX ORDER — PROMETHAZINE HYDROCHLORIDE AND DEXTROMETHORPHAN HYDROBROMIDE 6.25; 15 MG/5ML; MG/5ML
5 SYRUP ORAL EVERY 4 HOURS PRN
Qty: 240 ML | Refills: 0 | Status: SHIPPED | OUTPATIENT
Start: 2022-07-28 | End: 2022-08-07

## 2022-07-28 RX ORDER — BENZONATATE 100 MG/1
100 CAPSULE ORAL 3 TIMES DAILY PRN
Qty: 30 CAPSULE | Refills: 0 | Status: SHIPPED | OUTPATIENT
Start: 2022-07-28 | End: 2022-08-27

## 2022-07-28 RX ORDER — IBUPROFEN 600 MG/1
600 TABLET ORAL EVERY 8 HOURS PRN
Qty: 30 TABLET | Refills: 0 | Status: SHIPPED | OUTPATIENT
Start: 2022-07-28 | End: 2022-08-07

## 2022-07-28 NOTE — PROGRESS NOTES
"Subjective:       Patient ID: Shane Savage is a 46 y.o. male.    Chief Complaint: Hypertension and Follow-up    This is a pleasant 45 yo male patient of Dr. Coreas who is known to me. He presents today for a HTN follow-up. PMH includes    Patient Active Problem List:     Morbid obesity with BMI of 40.0-44.9, adult     Lumbar facet arthropathy     Patellofemoral syndrome     Marijuana user     Decreased range of motion (ROM) of right knee     Decreased strength     Decreased mobility     Essential hypertension     Fatigue     Plantar fasciitis     Chronic lower back pain    BP elevated today, slightly improved with recheck but still not at goal. Did not take BP medication today, was rushing and forgot to take. Denies chest pain, SOB, dyspnea, palpitations, visual changes, HA, dizziness, or N/V/D. Normally compliant with current medication regimen. Not limiting sodium in diet. Eating a variety of fruits/vegetables. Drinks 1 cup of coffee daily. Drinks about 4 bottles of water daily. Quit smoking cigarettes but continues to vape; trying to reduce frequency. Exercising 3-4 days per week by walking for 30 minutes.     Patient reports he started experiencing URI symptoms 5 days ago that include productive cough, runny nose, body aches, "scratchy throat", chills that has not improved since onset. Denies fever, sore throat, painful swallowing, swollen glands, ear pain/pressure, sinus pressure. No known sick contacts. Tested self for COVID-19 4 days ago that was negative. Tried taking Theraflu and DayQuil and mucous relief treatment that has only offered mild relief.     Also reports he started experiencing left-sided low back pain that radiates down posterior left upper thigh 2 weeks ago; has no improved since onset. No fall/injury/trauma. Seeing a chiropractor, due for follow up this week. Tried taking Percocet that offered relief. Denies muscle spasms.     Review of Systems   Constitutional: Positive for chills and " fatigue. Negative for appetite change and fever.   HENT: Positive for nasal congestion and rhinorrhea. Negative for ear discharge, ear pain, postnasal drip, sinus pressure/congestion, sore throat and trouble swallowing.    Eyes: Negative for visual disturbance.   Respiratory: Positive for cough (productive). Negative for chest tightness, shortness of breath and wheezing.    Cardiovascular: Negative for chest pain.   Gastrointestinal: Negative for abdominal pain, diarrhea and vomiting.   Genitourinary: Negative for difficulty urinating and hematuria.   Musculoskeletal: Positive for back pain. Negative for gait problem.   Neurological: Negative for weakness, headaches, disturbances in coordination and coordination difficulties.         Objective:      Physical Exam  Vitals reviewed.   Constitutional:       General: He is awake. He is not in acute distress.     Appearance: Normal appearance. He is well-developed and well-groomed. He is morbidly obese.   HENT:      Head: Normocephalic and atraumatic.      Right Ear: Tympanic membrane, ear canal and external ear normal.      Left Ear: Tympanic membrane, ear canal and external ear normal.      Nose: Rhinorrhea present.      Right Turbinates: Enlarged.      Left Turbinates: Enlarged.      Mouth/Throat:      Lips: Pink.      Mouth: Mucous membranes are moist.      Pharynx: Uvula midline. No oropharyngeal exudate or posterior oropharyngeal erythema.      Comments: Postnasal drip visualized  Eyes:      General:         Right eye: No discharge.         Left eye: No discharge.      Extraocular Movements: Extraocular movements intact.      Pupils: Pupils are equal, round, and reactive to light.   Neck:      Vascular: No carotid bruit.   Cardiovascular:      Rate and Rhythm: Normal rate and regular rhythm.      Pulses:           Carotid pulses are 2+ on the right side.       Radial pulses are 2+ on the right side and 2+ on the left side.        Dorsalis pedis pulses are 2+ on the  right side and 2+ on the left side.        Posterior tibial pulses are 2+ on the right side and 2+ on the left side.      Heart sounds: Normal heart sounds, S1 normal and S2 normal. No murmur heard.  Pulmonary:      Effort: Pulmonary effort is normal. No respiratory distress.      Breath sounds: Normal breath sounds. No wheezing or rhonchi.   Abdominal:      General: There is no distension.   Musculoskeletal:      Lumbar back: Tenderness present. No spasms.        Back:       Right lower leg: No edema.      Left lower leg: No edema.      Comments: Pain/TTP to area outlined in red, radiating down posterior left upper thigh   Lymphadenopathy:      Cervical: No cervical adenopathy.   Skin:     General: Skin is warm and dry.      Capillary Refill: Capillary refill takes less than 2 seconds.      Coloration: Skin is not pale.   Neurological:      Mental Status: He is alert and oriented to person, place, and time.      Coordination: Coordination normal.      Gait: Gait normal.   Psychiatric:         Attention and Perception: Attention normal.         Mood and Affect: Mood and affect normal.         Speech: Speech normal.         Behavior: Behavior normal. Behavior is cooperative.         Thought Content: Thought content normal.         Assessment:       Problem List Items Addressed This Visit        Cardiac/Vascular    Essential hypertension - Primary    Relevant Orders    CBC Auto Differential    Comprehensive Metabolic Panel    Lipid Panel    TSH    Urinalysis       Endocrine    Morbid obesity with BMI of 40.0-44.9, adult      Other Visit Diagnoses     Viral upper respiratory illness        Relevant Medications    benzonatate (TESSALON) 100 MG capsule    promethazine-dextromethorphan (PROMETHAZINE-DM) 6.25-15 mg/5 mL Syrp    Cough        Relevant Orders    POCT COVID-19 Rapid Screening (Completed)    Acute left-sided low back pain with left-sided sciatica        Relevant Medications    ibuprofen (ADVIL,MOTRIN) 600 MG  tablet          Plan:       Shane was seen today for hypertension and follow-up.    Diagnoses and all orders for this visit:    Essential hypertension  -     CBC Auto Differential; Future  -     Comprehensive Metabolic Panel; Future  -     Lipid Panel; Future  -     TSH; Future  -     Urinalysis; Future    Viral upper respiratory illness  -     benzonatate (TESSALON) 100 MG capsule; Take 1 capsule (100 mg total) by mouth 3 (three) times daily as needed for Cough.  -     promethazine-dextromethorphan (PROMETHAZINE-DM) 6.25-15 mg/5 mL Syrp; Take 5 mLs by mouth every 4 (four) hours as needed.    Cough  -     POCT COVID-19 Rapid Screening    Acute left-sided low back pain with left-sided sciatica  -     ibuprofen (ADVIL,MOTRIN) 600 MG tablet; Take 1 tablet (600 mg total) by mouth every 8 (eight) hours as needed for Pain.    Morbid obesity with BMI of 40.0-44.9, adult          - Rapid COVID-19 testing done today: NEGATIVE  - Likely viral illness: will send Tessalon perles TID PRN cough, use cough syrup at night as it may cause drowsiness  - Drink plenty fluids and eat as tolerated  - Hot fluids and humidifier may help  - Continue with current treatment plan  - Encouraged patient to continue to eat a low salt/low fat diet and discussed importance of engaging in physical activity at least 5x/week for a minimum of 30 min/day  - Advised to take BP medication as soon as he gets home, wait 1 hour, and message through [employer] with home BP reading  - Apply heating pad to lower back for 10-15 minutes at a time, multiple times daily  - May take NSAID up to TID PRN pain, take with food  - Warning signs dicsussed  - RTC in 3 months for HTN follow-up with pre-labs, or sooner if needed          I spent a total of 36 minutes on the day of the visit.  This includes face to face time and non-face to face time preparing to see the patient (eg, review of tests), obtaining and/or reviewing separately obtained history, documenting clinical  information in the electronic or other health record, independently interpreting results and communicating results to the patient/family/caregiver, or care coordinator.

## 2023-01-17 ENCOUNTER — TELEPHONE (OUTPATIENT)
Dept: FAMILY MEDICINE | Facility: CLINIC | Age: 48
End: 2023-01-17
Payer: COMMERCIAL

## 2023-01-19 ENCOUNTER — OFFICE VISIT (OUTPATIENT)
Dept: FAMILY MEDICINE | Facility: CLINIC | Age: 48
End: 2023-01-19
Payer: COMMERCIAL

## 2023-01-19 VITALS
OXYGEN SATURATION: 98 % | SYSTOLIC BLOOD PRESSURE: 134 MMHG | HEIGHT: 73 IN | DIASTOLIC BLOOD PRESSURE: 82 MMHG | HEART RATE: 58 BPM | BODY MASS INDEX: 41.75 KG/M2 | WEIGHT: 315 LBS

## 2023-01-19 DIAGNOSIS — B37.2 CANDIDAL INTERTRIGO: Primary | ICD-10-CM

## 2023-01-19 DIAGNOSIS — I10 ESSENTIAL HYPERTENSION: ICD-10-CM

## 2023-01-19 PROCEDURE — 1159F PR MEDICATION LIST DOCUMENTED IN MEDICAL RECORD: ICD-10-PCS | Mod: CPTII,S$GLB,, | Performed by: NURSE PRACTITIONER

## 2023-01-19 PROCEDURE — 3008F BODY MASS INDEX DOCD: CPT | Mod: CPTII,S$GLB,, | Performed by: NURSE PRACTITIONER

## 2023-01-19 PROCEDURE — 3079F DIAST BP 80-89 MM HG: CPT | Mod: CPTII,S$GLB,, | Performed by: NURSE PRACTITIONER

## 2023-01-19 PROCEDURE — 3008F PR BODY MASS INDEX (BMI) DOCUMENTED: ICD-10-PCS | Mod: CPTII,S$GLB,, | Performed by: NURSE PRACTITIONER

## 2023-01-19 PROCEDURE — 1160F PR REVIEW ALL MEDS BY PRESCRIBER/CLIN PHARMACIST DOCUMENTED: ICD-10-PCS | Mod: CPTII,S$GLB,, | Performed by: NURSE PRACTITIONER

## 2023-01-19 PROCEDURE — 1160F RVW MEDS BY RX/DR IN RCRD: CPT | Mod: CPTII,S$GLB,, | Performed by: NURSE PRACTITIONER

## 2023-01-19 PROCEDURE — 3079F PR MOST RECENT DIASTOLIC BLOOD PRESSURE 80-89 MM HG: ICD-10-PCS | Mod: CPTII,S$GLB,, | Performed by: NURSE PRACTITIONER

## 2023-01-19 PROCEDURE — 3075F PR MOST RECENT SYSTOLIC BLOOD PRESS GE 130-139MM HG: ICD-10-PCS | Mod: CPTII,S$GLB,, | Performed by: NURSE PRACTITIONER

## 2023-01-19 PROCEDURE — 99999 PR PBB SHADOW E&M-EST. PATIENT-LVL IV: ICD-10-PCS | Mod: PBBFAC,,, | Performed by: NURSE PRACTITIONER

## 2023-01-19 PROCEDURE — 99214 PR OFFICE/OUTPT VISIT, EST, LEVL IV, 30-39 MIN: ICD-10-PCS | Mod: S$GLB,,, | Performed by: NURSE PRACTITIONER

## 2023-01-19 PROCEDURE — 99214 OFFICE O/P EST MOD 30 MIN: CPT | Mod: S$GLB,,, | Performed by: NURSE PRACTITIONER

## 2023-01-19 PROCEDURE — 3075F SYST BP GE 130 - 139MM HG: CPT | Mod: CPTII,S$GLB,, | Performed by: NURSE PRACTITIONER

## 2023-01-19 PROCEDURE — 1159F MED LIST DOCD IN RCRD: CPT | Mod: CPTII,S$GLB,, | Performed by: NURSE PRACTITIONER

## 2023-01-19 PROCEDURE — 99999 PR PBB SHADOW E&M-EST. PATIENT-LVL IV: CPT | Mod: PBBFAC,,, | Performed by: NURSE PRACTITIONER

## 2023-01-19 RX ORDER — HYDROCHLOROTHIAZIDE 25 MG/1
25 TABLET ORAL DAILY
Qty: 90 TABLET | Refills: 1 | Status: SHIPPED | OUTPATIENT
Start: 2023-01-19 | End: 2023-08-28 | Stop reason: SDUPTHER

## 2023-01-19 NOTE — PROGRESS NOTES
Subjective:       Patient ID: Shane Savage is a 47 y.o. male.    Chief Complaint: Rash    This is a pleasant 46 yo male patient of Dr. Coreas who is known to me. He presents today with c/o rash. PMH includes    Patient Active Problem List:     Morbid obesity with BMI of 40.0-44.9, adult     Lumbar facet arthropathy     Patellofemoral syndrome     Marijuana user     Decreased range of motion (ROM) of right knee     Decreased strength     Decreased mobility     Essential hypertension     Fatigue     Plantar fasciitis     Chronic lower back pain    VSS today. Reports he has been suffering with a rash to his lower abdomen x 1 week that has slightly improved since onset. Rash is itchy. No drainage, bumps or pustules. No reported use of new soap, lotion, detergent. Has tried applying OTC clotrimazole cream that has helped.     Regarding HTN, missed last lab appt and wishing to reschedule appt. Trying to follow proper diet and remain physically active. Requesting referral to Optometry for routine eye exam.     Review of Systems   Integumentary:  Positive for rash.       Objective:      Physical Exam  Vitals reviewed.   Constitutional:       General: He is not in acute distress.     Appearance: Normal appearance. He is well-developed and well-groomed. He is obese.   HENT:      Head: Normocephalic.   Cardiovascular:      Rate and Rhythm: Normal rate and regular rhythm.      Heart sounds: No murmur heard.  Pulmonary:      Effort: Pulmonary effort is normal. No respiratory distress.      Breath sounds: Normal breath sounds. No wheezing or rhonchi.   Abdominal:      General: There is no distension.   Skin:     General: Skin is warm and dry.      Findings: Rash present. Rash is urticarial. Rash is not crusting, macular, nodular, papular, pustular or vesicular.             Comments: Beefy rash noted under panniculus as outlined in red   Neurological:      Mental Status: He is alert and oriented to person, place, and time.    Psychiatric:         Attention and Perception: Attention normal.         Mood and Affect: Mood and affect normal.         Speech: Speech normal.         Behavior: Behavior normal. Behavior is cooperative.       Assessment:       Problem List Items Addressed This Visit          Cardiac/Vascular    Essential hypertension    Relevant Medications    hydroCHLOROthiazide (HYDRODIURIL) 25 MG tablet    Other Relevant Orders    Ambulatory referral/consult to Optometry     Other Visit Diagnoses       Candidal intertrigo    -  Primary              Plan:       Shane was seen today for rash.    Diagnoses and all orders for this visit:    Candidal intertrigo    Essential hypertension  -     hydroCHLOROthiazide (HYDRODIURIL) 25 MG tablet; Take 1 tablet (25 mg total) by mouth once daily.  -     Ambulatory referral/consult to Optometry; Future          - Continue to use antifungal cream to site twice daily until cleared  - Cleanse gently with hypoallergenic soap and warm water, pat dry  - Wear moisture absorbent clothing  - Refill sent to preferred pharmacy  - Follow up with Optometry  - RTC as needed          I spent a total of 30 minutes on the day of the visit.  This includes face to face time and non-face to face time preparing to see the patient (eg, review of tests), obtaining and/or reviewing separately obtained history, documenting clinical information in the electronic or other health record, independently interpreting results and communicating results to the patient/family/caregiver, or care coordinator.

## 2023-01-24 ENCOUNTER — LAB VISIT (OUTPATIENT)
Dept: LAB | Facility: HOSPITAL | Age: 48
End: 2023-01-24
Attending: NURSE PRACTITIONER
Payer: COMMERCIAL

## 2023-01-24 DIAGNOSIS — I10 ESSENTIAL HYPERTENSION: ICD-10-CM

## 2023-01-24 LAB
BILIRUB UR QL STRIP: NEGATIVE
CLARITY UR REFRACT.AUTO: CLEAR
COLOR UR AUTO: YELLOW
GLUCOSE UR QL STRIP: NEGATIVE
HGB UR QL STRIP: NEGATIVE
KETONES UR QL STRIP: NEGATIVE
LEUKOCYTE ESTERASE UR QL STRIP: NEGATIVE
NITRITE UR QL STRIP: NEGATIVE
PH UR STRIP: 6 [PH] (ref 5–8)
PROT UR QL STRIP: NEGATIVE
SP GR UR STRIP: 1.02 (ref 1–1.03)
URN SPEC COLLECT METH UR: NORMAL

## 2023-01-24 PROCEDURE — 81003 URINALYSIS AUTO W/O SCOPE: CPT | Performed by: NURSE PRACTITIONER

## 2023-04-27 ENCOUNTER — TELEPHONE (OUTPATIENT)
Dept: OPTOMETRY | Facility: CLINIC | Age: 48
End: 2023-04-27
Payer: COMMERCIAL

## 2023-04-27 ENCOUNTER — OFFICE VISIT (OUTPATIENT)
Dept: OPTOMETRY | Facility: CLINIC | Age: 48
End: 2023-04-27
Payer: COMMERCIAL

## 2023-04-27 DIAGNOSIS — Z97.3 WEARS CONTACT LENSES: ICD-10-CM

## 2023-04-27 DIAGNOSIS — H18.601 KERATOCONUS OF RIGHT EYE: ICD-10-CM

## 2023-04-27 DIAGNOSIS — H52.202 MYOPIA OF LEFT EYE WITH ASTIGMATISM AND PRESBYOPIA: ICD-10-CM

## 2023-04-27 DIAGNOSIS — Z46.0 FITTING AND ADJUSTMENT OF SPECTACLES AND CONTACT LENSES: Primary | ICD-10-CM

## 2023-04-27 DIAGNOSIS — I10 ESSENTIAL HYPERTENSION: ICD-10-CM

## 2023-04-27 DIAGNOSIS — H52.12 MYOPIA OF LEFT EYE WITH ASTIGMATISM AND PRESBYOPIA: ICD-10-CM

## 2023-04-27 DIAGNOSIS — Z01.00 ROUTINE EYE EXAM: Primary | ICD-10-CM

## 2023-04-27 DIAGNOSIS — H04.123 DRY EYE SYNDROME OF BOTH EYES: ICD-10-CM

## 2023-04-27 DIAGNOSIS — H52.4 MYOPIA OF LEFT EYE WITH ASTIGMATISM AND PRESBYOPIA: ICD-10-CM

## 2023-04-27 PROCEDURE — 1159F PR MEDICATION LIST DOCUMENTED IN MEDICAL RECORD: ICD-10-PCS | Mod: CPTII,S$GLB,, | Performed by: OPTOMETRIST

## 2023-04-27 PROCEDURE — 92004 PR EYE EXAM, NEW PATIENT,COMPREHESV: ICD-10-PCS | Mod: S$GLB,,, | Performed by: OPTOMETRIST

## 2023-04-27 PROCEDURE — 92310 PR CONTACT LENS FITTING (NO CHANGE): ICD-10-PCS | Mod: S$GLB,,, | Performed by: OPTOMETRIST

## 2023-04-27 PROCEDURE — 99999 PR PBB SHADOW E&M-EST. PATIENT-LVL III: CPT | Mod: PBBFAC,,, | Performed by: OPTOMETRIST

## 2023-04-27 PROCEDURE — 92004 COMPRE OPH EXAM NEW PT 1/>: CPT | Mod: S$GLB,,, | Performed by: OPTOMETRIST

## 2023-04-27 PROCEDURE — 99499 NO LOS: ICD-10-PCS | Mod: S$GLB,,, | Performed by: OPTOMETRIST

## 2023-04-27 PROCEDURE — 1159F MED LIST DOCD IN RCRD: CPT | Mod: CPTII,S$GLB,, | Performed by: OPTOMETRIST

## 2023-04-27 PROCEDURE — 92310 CONTACT LENS FITTING OU: CPT | Mod: S$GLB,,, | Performed by: OPTOMETRIST

## 2023-04-27 PROCEDURE — 92015 DETERMINE REFRACTIVE STATE: CPT | Mod: S$GLB,,, | Performed by: OPTOMETRIST

## 2023-04-27 PROCEDURE — 92015 PR REFRACTION: ICD-10-PCS | Mod: S$GLB,,, | Performed by: OPTOMETRIST

## 2023-04-27 PROCEDURE — 99499 UNLISTED E&M SERVICE: CPT | Mod: S$GLB,,, | Performed by: OPTOMETRIST

## 2023-04-27 PROCEDURE — 99999 PR PBB SHADOW E&M-EST. PATIENT-LVL III: ICD-10-PCS | Mod: PBBFAC,,, | Performed by: OPTOMETRIST

## 2023-04-27 NOTE — TELEPHONE ENCOUNTER
----- Message from Jefry Zhou sent at 4/27/2023  8:55 AM CDT -----  Good morning,    Dr. Mena would like to refer this pt for Specialty Fit OD only due to Keratoconus.  Thank you!

## 2023-04-27 NOTE — TELEPHONE ENCOUNTER
Clinic left VM for pt to schedule [facility] specialty CL fitting with Dr. Amaro, pt referred by Dr. Mena. Clinic left dept # for pt to call back to schedule appt.

## 2023-04-27 NOTE — PROGRESS NOTES
HPI    CC: Pt is here today for Hypertensive exam w/ contact lens fitting OS   only. He states he currently only wears a contact lens in the left eye for   distance only. He has Keratoconus OD so he does not see very much in that   eye. He has noticed a decrease in his vision OS at both ranges.    JANELLE: 1 year    (+) Changes in vision   (-) Pain  (+) Irritation, mucous buildup  (+) Itching   (-) Flashes  (+) Floaters; longstanding   (+) Glasses wearer  (+) CL wearer, SCL monthly replacement OS only   (-) Uses eye gtts    Does patient want a refraction today? yes    (-) Eye injury  (-) Eye surgery   (+)POHx, Keratoconus OD  (+)FOHx, Glaucoma    (-)DM         Last edited by Patricia Mena, OD on 4/27/2023 12:53 PM.            Assessment /Plan     For exam results, see Encounter Report.    Routine eye exam    Myopia of left eye with astigmatism and presbyopia    Essential hypertension  -     Ambulatory referral/consult to Optometry    Keratoconus of right eye    Dry eye syndrome of both eyes      [facility] Exam.     2. Updated SRx. Monitor yearly.    Updated CL Rx. Fit with SCL OS only due to keratoconus OD. Pt interested in speciality fit OD --- will refer to Dr. Amaro for further eval. Discussed OS CL is for distance only. Pt to use OTC reading glasses on top of CL prn for near work. Initially good comfort and vision with CL OS. Given CL trial to take home. If happy with comfort and vision of trial lens, pt may order annual supply. If issues arise, RTC for CL f/u. Reviewed proper CL care and hygiene. Monitor yearly unless changes noted sooner.      3. No retinopathy noted, OU. Continue proper BP control and annual eye exams. Monitor yearly.      4. Educated pt on findings. Refer to Dr. Amaro for speciality fit OD. Monitor.     5. Educated pt on findings. Recommended ATs TID-QID for added lubrication and comfort. If symptoms worsen or dont improve, RTC. Monitor.      NOTE: Pt to return to clinic for DFE. Unable to  complete today due to work. Educated on s/s of RD and to RTC ASAP if occurs.       RTC in 1 year for annual eye exam or sooner if needed.

## 2023-05-02 ENCOUNTER — PATIENT MESSAGE (OUTPATIENT)
Dept: OPTOMETRY | Facility: CLINIC | Age: 48
End: 2023-05-02
Payer: COMMERCIAL

## 2023-06-05 NOTE — TELEPHONE ENCOUNTER
Spoke with [facility] pt med is cheaper as a capsule. Med will be change to capsule.    Unable to assess due to medical condition

## 2023-06-21 ENCOUNTER — OFFICE VISIT (OUTPATIENT)
Dept: OPTOMETRY | Facility: CLINIC | Age: 48
End: 2023-06-21
Payer: COMMERCIAL

## 2023-06-21 DIAGNOSIS — H18.601 KERATOCONUS OF RIGHT EYE: ICD-10-CM

## 2023-06-21 DIAGNOSIS — I10 ESSENTIAL HYPERTENSION: Primary | ICD-10-CM

## 2023-06-21 PROCEDURE — 99999 PR PBB SHADOW E&M-EST. PATIENT-LVL III: CPT | Mod: PBBFAC,,, | Performed by: OPTOMETRIST

## 2023-06-21 PROCEDURE — 99499 NO LOS: ICD-10-PCS | Mod: S$GLB,,, | Performed by: OPTOMETRIST

## 2023-06-21 PROCEDURE — 99499 UNLISTED E&M SERVICE: CPT | Mod: S$GLB,,, | Performed by: OPTOMETRIST

## 2023-06-21 PROCEDURE — 99999 PR PBB SHADOW E&M-EST. PATIENT-LVL III: ICD-10-PCS | Mod: PBBFAC,,, | Performed by: OPTOMETRIST

## 2023-06-21 NOTE — PROGRESS NOTES
HPI    Presents today for DFE. Pt was last seen on 4/27/2023. No new concerning   problems since last visit. Pt wear left contact for dist only.  Last edited by Kenzie Delacruz MA on 6/21/2023  2:27 PM.            Assessment /Plan     For exam results, see Encounter Report.    Essential hypertension    Dry eye syndrome of both eyes    Keratoconus of right eye    Myopia of left eye with astigmatism and presbyopia        Pt here for completion of DFE. Retina WNL OU. No holes, tears, detachments 360 OU. No HTN ret noted OU. Pt to continue ATs prn for dryness. Pt would like to try soft lens OS only for now. Will f/u if wants a referral for a speciality fit OD. Monitor.       RTC for next annual or sooner if needed.

## 2023-07-13 ENCOUNTER — TELEPHONE (OUTPATIENT)
Dept: FAMILY MEDICINE | Facility: CLINIC | Age: 48
End: 2023-07-13
Payer: COMMERCIAL

## 2023-07-13 NOTE — TELEPHONE ENCOUNTER
I spoke with patient notifying him appointment will have to be rescheduled due to provider not being in office. Patient rescheduled appointment 8/2.

## 2023-08-02 ENCOUNTER — OFFICE VISIT (OUTPATIENT)
Dept: FAMILY MEDICINE | Facility: CLINIC | Age: 48
End: 2023-08-02
Payer: COMMERCIAL

## 2023-08-02 ENCOUNTER — LAB VISIT (OUTPATIENT)
Dept: LAB | Facility: HOSPITAL | Age: 48
End: 2023-08-02
Attending: NURSE PRACTITIONER
Payer: COMMERCIAL

## 2023-08-02 VITALS
WEIGHT: 284.81 LBS | DIASTOLIC BLOOD PRESSURE: 90 MMHG | SYSTOLIC BLOOD PRESSURE: 146 MMHG | BODY MASS INDEX: 37.75 KG/M2 | HEIGHT: 73 IN | HEART RATE: 70 BPM | OXYGEN SATURATION: 98 %

## 2023-08-02 DIAGNOSIS — F17.200 CURRENT EVERY DAY SMOKER: ICD-10-CM

## 2023-08-02 DIAGNOSIS — F32.1 EPISODE OF MODERATE MAJOR DEPRESSION: ICD-10-CM

## 2023-08-02 DIAGNOSIS — Z12.11 SCREENING FOR COLON CANCER: ICD-10-CM

## 2023-08-02 DIAGNOSIS — E66.01 SEVERE OBESITY WITH BODY MASS INDEX (BMI) OF 35.0 TO 39.9 WITH COMORBIDITY: ICD-10-CM

## 2023-08-02 DIAGNOSIS — Z00.00 ANNUAL PHYSICAL EXAM: Primary | ICD-10-CM

## 2023-08-02 DIAGNOSIS — Z00.00 ANNUAL PHYSICAL EXAM: ICD-10-CM

## 2023-08-02 DIAGNOSIS — I10 ESSENTIAL HYPERTENSION: ICD-10-CM

## 2023-08-02 LAB
ALBUMIN SERPL BCP-MCNC: 3.9 G/DL (ref 3.5–5.2)
ALP SERPL-CCNC: 45 U/L (ref 55–135)
ALT SERPL W/O P-5'-P-CCNC: 37 U/L (ref 10–44)
ANION GAP SERPL CALC-SCNC: 11 MMOL/L (ref 8–16)
AST SERPL-CCNC: 104 U/L (ref 10–40)
BASOPHILS # BLD AUTO: 0.02 K/UL (ref 0–0.2)
BASOPHILS NFR BLD: 0.5 % (ref 0–1.9)
BILIRUB SERPL-MCNC: 1.4 MG/DL (ref 0.1–1)
BUN SERPL-MCNC: 10 MG/DL (ref 6–20)
CALCIUM SERPL-MCNC: 9.6 MG/DL (ref 8.7–10.5)
CHLORIDE SERPL-SCNC: 102 MMOL/L (ref 95–110)
CO2 SERPL-SCNC: 25 MMOL/L (ref 23–29)
CREAT SERPL-MCNC: 1.1 MG/DL (ref 0.5–1.4)
DIFFERENTIAL METHOD: ABNORMAL
EOSINOPHIL # BLD AUTO: 0.2 K/UL (ref 0–0.5)
EOSINOPHIL NFR BLD: 3.6 % (ref 0–8)
ERYTHROCYTE [DISTWIDTH] IN BLOOD BY AUTOMATED COUNT: 13.7 % (ref 11.5–14.5)
EST. GFR  (NO RACE VARIABLE): >60 ML/MIN/1.73 M^2
ESTIMATED AVG GLUCOSE: 103 MG/DL (ref 68–131)
GLUCOSE SERPL-MCNC: 94 MG/DL (ref 70–110)
HBA1C MFR BLD: 5.2 % (ref 4–5.6)
HCT VFR BLD AUTO: 45.1 % (ref 40–54)
HGB BLD-MCNC: 14.2 G/DL (ref 14–18)
IMM GRANULOCYTES # BLD AUTO: 0.01 K/UL (ref 0–0.04)
IMM GRANULOCYTES NFR BLD AUTO: 0.2 % (ref 0–0.5)
LYMPHOCYTES # BLD AUTO: 1.3 K/UL (ref 1–4.8)
LYMPHOCYTES NFR BLD: 28.3 % (ref 18–48)
MCH RBC QN AUTO: 32.2 PG (ref 27–31)
MCHC RBC AUTO-ENTMCNC: 31.5 G/DL (ref 32–36)
MCV RBC AUTO: 102 FL (ref 82–98)
MONOCYTES # BLD AUTO: 0.5 K/UL (ref 0.3–1)
MONOCYTES NFR BLD: 12 % (ref 4–15)
NEUTROPHILS # BLD AUTO: 2.4 K/UL (ref 1.8–7.7)
NEUTROPHILS NFR BLD: 55.4 % (ref 38–73)
NRBC BLD-RTO: 0 /100 WBC
PLATELET # BLD AUTO: 308 K/UL (ref 150–450)
PMV BLD AUTO: 10.2 FL (ref 9.2–12.9)
POTASSIUM SERPL-SCNC: 4.1 MMOL/L (ref 3.5–5.1)
PROT SERPL-MCNC: 7.2 G/DL (ref 6–8.4)
RBC # BLD AUTO: 4.41 M/UL (ref 4.6–6.2)
SODIUM SERPL-SCNC: 138 MMOL/L (ref 136–145)
WBC # BLD AUTO: 4.41 K/UL (ref 3.9–12.7)

## 2023-08-02 PROCEDURE — 3008F BODY MASS INDEX DOCD: CPT | Mod: CPTII,S$GLB,, | Performed by: NURSE PRACTITIONER

## 2023-08-02 PROCEDURE — 85025 COMPLETE CBC W/AUTO DIFF WBC: CPT | Performed by: NURSE PRACTITIONER

## 2023-08-02 PROCEDURE — 80053 COMPREHEN METABOLIC PANEL: CPT | Performed by: NURSE PRACTITIONER

## 2023-08-02 PROCEDURE — 1159F MED LIST DOCD IN RCRD: CPT | Mod: CPTII,S$GLB,, | Performed by: NURSE PRACTITIONER

## 2023-08-02 PROCEDURE — 3008F PR BODY MASS INDEX (BMI) DOCUMENTED: ICD-10-PCS | Mod: CPTII,S$GLB,, | Performed by: NURSE PRACTITIONER

## 2023-08-02 PROCEDURE — 1160F PR REVIEW ALL MEDS BY PRESCRIBER/CLIN PHARMACIST DOCUMENTED: ICD-10-PCS | Mod: CPTII,S$GLB,, | Performed by: NURSE PRACTITIONER

## 2023-08-02 PROCEDURE — 1160F RVW MEDS BY RX/DR IN RCRD: CPT | Mod: CPTII,S$GLB,, | Performed by: NURSE PRACTITIONER

## 2023-08-02 PROCEDURE — 83036 HEMOGLOBIN GLYCOSYLATED A1C: CPT | Performed by: NURSE PRACTITIONER

## 2023-08-02 PROCEDURE — 99215 OFFICE O/P EST HI 40 MIN: CPT | Mod: S$GLB,,, | Performed by: NURSE PRACTITIONER

## 2023-08-02 PROCEDURE — 3077F PR MOST RECENT SYSTOLIC BLOOD PRESSURE >= 140 MM HG: ICD-10-PCS | Mod: CPTII,S$GLB,, | Performed by: NURSE PRACTITIONER

## 2023-08-02 PROCEDURE — 4010F ACE/ARB THERAPY RXD/TAKEN: CPT | Mod: CPTII,S$GLB,, | Performed by: NURSE PRACTITIONER

## 2023-08-02 PROCEDURE — 4010F PR ACE/ARB THEARPY RXD/TAKEN: ICD-10-PCS | Mod: CPTII,S$GLB,, | Performed by: NURSE PRACTITIONER

## 2023-08-02 PROCEDURE — 3080F DIAST BP >= 90 MM HG: CPT | Mod: CPTII,S$GLB,, | Performed by: NURSE PRACTITIONER

## 2023-08-02 PROCEDURE — 1159F PR MEDICATION LIST DOCUMENTED IN MEDICAL RECORD: ICD-10-PCS | Mod: CPTII,S$GLB,, | Performed by: NURSE PRACTITIONER

## 2023-08-02 PROCEDURE — 99215 PR OFFICE/OUTPT VISIT, EST, LEVL V, 40-54 MIN: ICD-10-PCS | Mod: S$GLB,,, | Performed by: NURSE PRACTITIONER

## 2023-08-02 PROCEDURE — 3077F SYST BP >= 140 MM HG: CPT | Mod: CPTII,S$GLB,, | Performed by: NURSE PRACTITIONER

## 2023-08-02 PROCEDURE — 3080F PR MOST RECENT DIASTOLIC BLOOD PRESSURE >= 90 MM HG: ICD-10-PCS | Mod: CPTII,S$GLB,, | Performed by: NURSE PRACTITIONER

## 2023-08-02 PROCEDURE — 36415 COLL VENOUS BLD VENIPUNCTURE: CPT | Mod: PO | Performed by: NURSE PRACTITIONER

## 2023-08-02 PROCEDURE — 99999 PR PBB SHADOW E&M-EST. PATIENT-LVL V: ICD-10-PCS | Mod: PBBFAC,,, | Performed by: NURSE PRACTITIONER

## 2023-08-02 PROCEDURE — 99999 PR PBB SHADOW E&M-EST. PATIENT-LVL V: CPT | Mod: PBBFAC,,, | Performed by: NURSE PRACTITIONER

## 2023-08-02 RX ORDER — LOSARTAN POTASSIUM 25 MG/1
25 TABLET ORAL DAILY
Qty: 90 TABLET | Refills: 0 | Status: SHIPPED | OUTPATIENT
Start: 2023-08-02 | End: 2023-11-06

## 2023-08-02 NOTE — PROGRESS NOTES
Subjective     Patient ID: Shane Savage is a 47 y.o. male.    Chief Complaint: Annual Exam    This is a pleasant 46 yo male patient of Dr. Coreas who is known to me. He presents today for his annual physical. PMH includes    Patient Active Problem List:     Severe obesity with body mass index (BMI) of 35.0 to 39.9 with comorbidity     Lumbar facet arthropathy     Patellofemoral syndrome     Marijuana user     Decreased range of motion (ROM) of right knee     Decreased strength     Decreased mobility     Essential hypertension     Fatigue     Plantar fasciitis     Chronic lower back pain     Wears contact lenses     Current every day smoker     Episode of moderate major depression    BP elevated today, slightly higher with recheck. Denies chest pain, SOB, dyspnea, palpitations, visual changes, HA, dizziness, or N/V/D. Compliant with current medication regimen. Does not have home BP cuff. Eating a variety of fruits/vegetables. Drinks at least 6 bottles of water daily. Trying to limit sodium intake, occasionally eating fatty foods. Smoking 1 ppd; dealing with a lot of personal stressors. Drinks alcohol about 3x/week, mostly beer. No exercise routine currently but is cutting grass on Saturdays, at least 5 yards each time. PHQ-9 score of 12 today showing moderate depression. Denies SI/HI. Willing to speak with a therapist. Last eye exam was a few months ago. Due for colon CA screening. Had C-scope scheduled previously but was unable to fully complete the bowel prep. Up to date with all other screenings and immunizations.      Review of Systems   Musculoskeletal:  Positive for back pain (chronic, intermittent).   Psychiatric/Behavioral:  Positive for dysphoric mood.      Otherwise negative     Objective     Physical Exam  Vitals reviewed.   Constitutional:       General: He is awake. He is not in acute distress.     Appearance: Normal appearance. He is well-developed and well-groomed. He is obese.   HENT:      Head:  Normocephalic and atraumatic.      Right Ear: Tympanic membrane, ear canal and external ear normal.      Left Ear: Tympanic membrane, ear canal and external ear normal.      Nose: Nose normal.      Mouth/Throat:      Mouth: Mucous membranes are moist.      Pharynx: No posterior oropharyngeal erythema.   Eyes:      General:         Right eye: No discharge.         Left eye: No discharge.      Extraocular Movements: Extraocular movements intact.      Pupils: Pupils are equal, round, and reactive to light.   Neck:      Vascular: No carotid bruit.   Cardiovascular:      Rate and Rhythm: Normal rate and regular rhythm.      Pulses:           Carotid pulses are 2+ on the right side and 2+ on the left side.       Radial pulses are 2+ on the right side and 2+ on the left side.        Dorsalis pedis pulses are 2+ on the right side and 2+ on the left side.        Posterior tibial pulses are 2+ on the right side and 2+ on the left side.      Heart sounds: Normal heart sounds, S1 normal and S2 normal. No murmur heard.  Pulmonary:      Effort: Pulmonary effort is normal. No respiratory distress.      Breath sounds: Normal breath sounds. No wheezing or rhonchi.   Abdominal:      General: Bowel sounds are normal. There is no distension.      Palpations: Abdomen is soft.      Tenderness: There is no abdominal tenderness. There is no guarding or rebound.   Musculoskeletal:      Right lower leg: No edema.      Left lower leg: No edema.   Lymphadenopathy:      Cervical: No cervical adenopathy.   Skin:     General: Skin is warm and dry.      Coloration: Skin is not pale.   Neurological:      Mental Status: He is alert and oriented to person, place, and time.      Coordination: Coordination normal.      Gait: Gait normal.   Psychiatric:         Attention and Perception: Attention normal.         Mood and Affect: Mood and affect normal.         Speech: Speech normal.         Behavior: Behavior normal. Behavior is cooperative.          Thought Content: Thought content normal. Thought content does not include homicidal or suicidal plan.         Cognition and Memory: Cognition and memory normal.      Comments: PHQ-9 score of 12 today            Assessment and Plan     1. Annual physical exam  -     CBC Auto Differential; Future; Expected date: 08/02/2023  -     Comprehensive Metabolic Panel; Future; Expected date: 08/02/2023  -     Hemoglobin A1C; Future; Expected date: 08/02/2023    2. Essential hypertension  -     losartan (COZAAR) 25 MG tablet; Take 1 tablet (25 mg total) by mouth once daily.  Dispense: 90 tablet; Refill: 0    3. Episode of moderate major depression  -     Ambulatory referral/consult to Psychiatry; Future; Expected date: 08/09/2023    4. Current every day smoker  -     Ambulatory referral/consult to Smoking Cessation Program; Future; Expected date: 08/09/2023    5. Screening for colon cancer  -     Cologuard Screening (Multitarget Stool DNA); Future; Expected date: 08/02/2023    6. Severe obesity with body mass index (BMI) of 35.0 to 39.9 with comorbidity        - Labs ordered today  - Eat a low salt/low fat diet and discussed importance of engaging in physical activity at least 5x/week for a minimum of 30 min/day  - Quit smoking! Advised to wait 15 minutes with each urge to smoke; discussed benefits of Smoking Cessation program and agrees to referral  - Declines medication treatment for depression at this time but open to therapy; referral placed and number provided to self-schedule  - Warning signs discussed  - Advised to purchase home BP cuff to monitor BPs and keep log  - RTC in 1 month for HTN follow-up, or sooner if needed         Follow up in about 1 month (around 9/2/2023), or if symptoms worsen or fail to improve.          I spent a total of 40 minutes on the day of the visit.  This includes face to face time and non-face to face time preparing to see the patient (eg, review of tests), obtaining and/or reviewing  separately obtained history, documenting clinical information in the electronic or other health record, independently interpreting results and communicating results to the patient/family/caregiver, or care coordinator.

## 2023-08-11 ENCOUNTER — PATIENT MESSAGE (OUTPATIENT)
Dept: FAMILY MEDICINE | Facility: CLINIC | Age: 48
End: 2023-08-11
Payer: COMMERCIAL

## 2023-08-25 DIAGNOSIS — I10 ESSENTIAL HYPERTENSION: ICD-10-CM

## 2023-08-28 ENCOUNTER — PATIENT OUTREACH (OUTPATIENT)
Dept: ADMINISTRATIVE | Facility: HOSPITAL | Age: 48
End: 2023-08-28
Payer: COMMERCIAL

## 2023-08-28 ENCOUNTER — PATIENT MESSAGE (OUTPATIENT)
Dept: FAMILY MEDICINE | Facility: CLINIC | Age: 48
End: 2023-08-28
Payer: COMMERCIAL

## 2023-08-28 ENCOUNTER — PATIENT MESSAGE (OUTPATIENT)
Dept: ADMINISTRATIVE | Facility: HOSPITAL | Age: 48
End: 2023-08-28
Payer: COMMERCIAL

## 2023-08-28 DIAGNOSIS — I10 ESSENTIAL HYPERTENSION: ICD-10-CM

## 2023-08-28 NOTE — PROGRESS NOTES
08/28/2023 Gap report audit performed. Care Everywhere updates requested and reviewed  Overdue HM topic chart audit and/or requested. LINKS triggered and reconciled. Media reviewed : Patient outreach regarding Health Maintenance- states he mailed cologuard 08/23/2023

## 2023-08-29 RX ORDER — HYDROCHLOROTHIAZIDE 25 MG/1
25 TABLET ORAL DAILY
Qty: 90 TABLET | Refills: 3 | Status: SHIPPED | OUTPATIENT
Start: 2023-08-29

## 2023-08-29 RX ORDER — HYDROCHLOROTHIAZIDE 25 MG/1
25 TABLET ORAL
Qty: 90 TABLET | Refills: 0 | OUTPATIENT
Start: 2023-08-29

## 2023-08-29 NOTE — TELEPHONE ENCOUNTER
Refill Decision Note   Shane Savage  is requesting a refill authorization.  Brief Assessment and Rationale for Refill:  Quick Discontinue     Medication Therapy Plan:  Duplicate from MyChart encounter 8/28/23 9:04AM      Comments:     Note composed:3:46 PM 08/29/2023

## 2023-08-29 NOTE — TELEPHONE ENCOUNTER
Refill Routing Note   Medication(s) are not appropriate for processing by [facility] for the following reason(s):      Patient not seen by provider within 15 months  Required vitals abnormal: 146/90    [facility] action(s):  Defer Care Due:  None identified          Appointments  past 12m or future 3m with PCP    Date Provider   Last Visit   8/2/2023 Selin Ovalle NP   Next Visit   8/30/2023 Selin Ovalle NP   ED visits in past 90 days: 0        Note composed:3:46 PM 08/29/2023

## 2023-08-30 ENCOUNTER — OFFICE VISIT (OUTPATIENT)
Dept: FAMILY MEDICINE | Facility: CLINIC | Age: 48
End: 2023-08-30
Payer: COMMERCIAL

## 2023-08-30 VITALS
BODY MASS INDEX: 38.57 KG/M2 | WEIGHT: 291 LBS | HEART RATE: 64 BPM | SYSTOLIC BLOOD PRESSURE: 122 MMHG | HEIGHT: 73 IN | OXYGEN SATURATION: 99 % | DIASTOLIC BLOOD PRESSURE: 82 MMHG

## 2023-08-30 DIAGNOSIS — M25.552 ACUTE PAIN OF LEFT HIP: Primary | ICD-10-CM

## 2023-08-30 DIAGNOSIS — I10 ESSENTIAL HYPERTENSION: ICD-10-CM

## 2023-08-30 LAB — NONINV COLON CA DNA+OCC BLD SCRN STL QL: NEGATIVE

## 2023-08-30 PROCEDURE — 99214 OFFICE O/P EST MOD 30 MIN: CPT | Mod: 25,,, | Performed by: NURSE PRACTITIONER

## 2023-08-30 PROCEDURE — 3008F PR BODY MASS INDEX (BMI) DOCUMENTED: ICD-10-PCS | Mod: CPTII,S$GLB,, | Performed by: NURSE PRACTITIONER

## 2023-08-30 PROCEDURE — 1159F PR MEDICATION LIST DOCUMENTED IN MEDICAL RECORD: ICD-10-PCS | Mod: CPTII,S$GLB,, | Performed by: NURSE PRACTITIONER

## 2023-08-30 PROCEDURE — 4010F PR ACE/ARB THEARPY RXD/TAKEN: ICD-10-PCS | Mod: CPTII,S$GLB,, | Performed by: NURSE PRACTITIONER

## 2023-08-30 PROCEDURE — 3079F PR MOST RECENT DIASTOLIC BLOOD PRESSURE 80-89 MM HG: ICD-10-PCS | Mod: CPTII,S$GLB,, | Performed by: NURSE PRACTITIONER

## 2023-08-30 PROCEDURE — 1159F MED LIST DOCD IN RCRD: CPT | Mod: CPTII,S$GLB,, | Performed by: NURSE PRACTITIONER

## 2023-08-30 PROCEDURE — 4010F ACE/ARB THERAPY RXD/TAKEN: CPT | Mod: CPTII,S$GLB,, | Performed by: NURSE PRACTITIONER

## 2023-08-30 PROCEDURE — 99214 PR OFFICE/OUTPT VISIT, EST, LEVL IV, 30-39 MIN: ICD-10-PCS | Mod: 25,,, | Performed by: NURSE PRACTITIONER

## 2023-08-30 PROCEDURE — 99999 PR PBB SHADOW E&M-EST. PATIENT-LVL IV: CPT | Mod: PBBFAC,,, | Performed by: NURSE PRACTITIONER

## 2023-08-30 PROCEDURE — 3008F BODY MASS INDEX DOCD: CPT | Mod: CPTII,S$GLB,, | Performed by: NURSE PRACTITIONER

## 2023-08-30 PROCEDURE — 3079F DIAST BP 80-89 MM HG: CPT | Mod: CPTII,S$GLB,, | Performed by: NURSE PRACTITIONER

## 2023-08-30 PROCEDURE — 96372 THER/PROPH/DIAG INJ SC/IM: CPT | Mod: S$GLB,,, | Performed by: NURSE PRACTITIONER

## 2023-08-30 PROCEDURE — 3074F SYST BP LT 130 MM HG: CPT | Mod: CPTII,S$GLB,, | Performed by: NURSE PRACTITIONER

## 2023-08-30 PROCEDURE — 99999 PR PBB SHADOW E&M-EST. PATIENT-LVL IV: ICD-10-PCS | Mod: PBBFAC,,, | Performed by: NURSE PRACTITIONER

## 2023-08-30 PROCEDURE — 3044F PR MOST RECENT HEMOGLOBIN A1C LEVEL <7.0%: ICD-10-PCS | Mod: CPTII,S$GLB,, | Performed by: NURSE PRACTITIONER

## 2023-08-30 PROCEDURE — 3074F PR MOST RECENT SYSTOLIC BLOOD PRESSURE < 130 MM HG: ICD-10-PCS | Mod: CPTII,S$GLB,, | Performed by: NURSE PRACTITIONER

## 2023-08-30 PROCEDURE — 96372 PR INJECTION,THERAP/PROPH/DIAG2ST, IM OR SUBCUT: ICD-10-PCS | Mod: S$GLB,,, | Performed by: NURSE PRACTITIONER

## 2023-08-30 PROCEDURE — 3044F HG A1C LEVEL LT 7.0%: CPT | Mod: CPTII,S$GLB,, | Performed by: NURSE PRACTITIONER

## 2023-08-30 RX ORDER — IBUPROFEN 600 MG/1
600 TABLET ORAL EVERY 8 HOURS PRN
Qty: 30 TABLET | Refills: 0 | Status: SHIPPED | OUTPATIENT
Start: 2023-08-30 | End: 2023-09-05 | Stop reason: ALTCHOICE

## 2023-08-30 RX ORDER — KETOROLAC TROMETHAMINE 30 MG/ML
30 INJECTION, SOLUTION INTRAMUSCULAR; INTRAVENOUS ONCE
Status: COMPLETED | OUTPATIENT
Start: 2023-08-30 | End: 2023-08-30

## 2023-08-30 RX ADMIN — KETOROLAC TROMETHAMINE 30 MG: 30 INJECTION, SOLUTION INTRAMUSCULAR; INTRAVENOUS at 08:08

## 2023-08-30 NOTE — PROGRESS NOTES
Subjective     Patient ID: Shane Savage is a 47 y.o. male.    Chief Complaint: Knee Pain    This is a pleasant 46 yo male patient of Dr. Coreas who is known to me. He presents today with c/o hip pain and HTN follow-up. PMH includes    Patient Active Problem List:     Severe obesity with body mass index (BMI) of 35.0 to 39.9 with comorbidity     Lumbar facet arthropathy     Patellofemoral syndrome     Marijuana user     Decreased range of motion (ROM) of right knee     Decreased strength     Decreased mobility     Essential hypertension     Fatigue     Plantar fasciitis     Chronic lower back pain     Wears contact lenses     Current every day smoker     Episode of moderate major depression    VSS today. Reports improved BPs at home, ranging in 130/80s. Taking losartan daily. No c/o CP, SOB, dyspnea. Has no exercise routine currently; still cutting grass on Saturdays. Reviewed recent labs. Normal kidney function. LFT elevated. Normal A1C.     Experiencing left hip pain and left lower back pain x past 2 weeks that has not improved since onset. Denies fall/injury/trauma. Says pain is felt more with movement. Tried taking pain reliever that offered temporary relief but pain returned. Denies radiating pain or spasms. Pain currently at 4-5/10. Denies leg swelling, discoloration, numbness/tingling.       Review of Systems   Musculoskeletal:  Positive for arthralgias and back pain.   Otherwise negative     Objective     Physical Exam  Vitals reviewed.   Constitutional:       General: He is not in acute distress.     Appearance: Normal appearance. He is well-developed and well-groomed. He is obese.   HENT:      Head: Normocephalic.   Eyes:      General:         Right eye: No discharge.         Left eye: No discharge.   Cardiovascular:      Rate and Rhythm: Normal rate and regular rhythm.      Pulses:           Dorsalis pedis pulses are 2+ on the right side and 2+ on the left side.      Heart sounds: Normal heart sounds,  S1 normal and S2 normal. No murmur heard.  Pulmonary:      Effort: Pulmonary effort is normal. No respiratory distress.      Breath sounds: No wheezing or rhonchi.   Abdominal:      General: There is no distension.   Musculoskeletal:         General: Tenderness present.      Lumbar back: No spasms. Positive left straight leg raise test.      Left hip: Tenderness present. Decreased range of motion (due to pain).      Right lower leg: No edema.      Left lower leg: No edema.   Skin:     General: Skin is warm and dry.      Coloration: Skin is not pale.   Neurological:      Mental Status: He is alert and oriented to person, place, and time.      Coordination: Coordination normal.      Gait: Gait normal.   Psychiatric:         Attention and Perception: Attention normal.         Mood and Affect: Mood and affect normal.         Speech: Speech normal.         Behavior: Behavior normal. Behavior is cooperative.         Thought Content: Thought content normal.            Assessment and Plan     1. Acute pain of left hip  -     ketorolac injection 30 mg  -     ibuprofen (ADVIL,MOTRIN) 600 MG tablet; Take 1 tablet (600 mg total) by mouth every 8 (eight) hours as needed for Pain.  Dispense: 30 tablet; Refill: 0    2. Essential hypertension        - Toradol IM to be given today  - Begin taking ibuprofen up to TID PRN pain, take with food  - Continue other medications as ordered  - Eat a low salt/low fat diet and discussed importance of engaging in physical activity at least 5x/week for a minimum of 30 min/day  - Avoid heavy lifting or strenuous activity  - Heating pad to site may offer relief  - Will cancel appt for next week and schedule routine follow-up in 5 months  - RTC as needed       Follow up in about 6 months (around 2/29/2024), or if symptoms worsen or fail to improve.          I spent a total of 30 minutes on the day of the visit.  This includes face to face time and non-face to face time preparing to see the patient  (eg, review of tests), obtaining and/or reviewing separately obtained history, documenting clinical information in the electronic or other health record, independently interpreting results and communicating results to the patient/family/caregiver, or care coordinator.

## 2023-09-05 ENCOUNTER — PATIENT MESSAGE (OUTPATIENT)
Dept: FAMILY MEDICINE | Facility: CLINIC | Age: 48
End: 2023-09-05
Payer: COMMERCIAL

## 2023-09-05 DIAGNOSIS — M25.552 ACUTE PAIN OF LEFT HIP: Primary | ICD-10-CM

## 2023-09-05 RX ORDER — MELOXICAM 15 MG/1
15 TABLET ORAL DAILY
Qty: 15 TABLET | Refills: 0 | Status: SHIPPED | OUTPATIENT
Start: 2023-09-05 | End: 2023-10-25

## 2023-10-25 ENCOUNTER — HOSPITAL ENCOUNTER (EMERGENCY)
Facility: HOSPITAL | Age: 48
Discharge: HOME | End: 2023-10-25
Attending: EMERGENCY MEDICINE
Payer: COMMERCIAL

## 2023-10-25 ENCOUNTER — OFFICE VISIT (OUTPATIENT)
Dept: PSYCHIATRY | Facility: CLINIC | Age: 48
End: 2023-10-25
Payer: COMMERCIAL

## 2023-10-25 VITALS
WEIGHT: 279 LBS | RESPIRATION RATE: 20 BRPM | TEMPERATURE: 99 F | DIASTOLIC BLOOD PRESSURE: 74 MMHG | SYSTOLIC BLOOD PRESSURE: 124 MMHG | BODY MASS INDEX: 36.81 KG/M2 | HEART RATE: 72 BPM | OXYGEN SATURATION: 98 %

## 2023-10-25 VITALS
SYSTOLIC BLOOD PRESSURE: 149 MMHG | DIASTOLIC BLOOD PRESSURE: 91 MMHG | HEART RATE: 75 BPM | BODY MASS INDEX: 36.84 KG/M2 | WEIGHT: 279.19 LBS

## 2023-10-25 DIAGNOSIS — F32.1 CURRENT MODERATE EPISODE OF MAJOR DEPRESSIVE DISORDER, UNSPECIFIED WHETHER RECURRENT: Primary | ICD-10-CM

## 2023-10-25 DIAGNOSIS — R00.2 PALPITATIONS: ICD-10-CM

## 2023-10-25 DIAGNOSIS — I48.92 ATRIAL FLUTTER, UNSPECIFIED TYPE: Primary | ICD-10-CM

## 2023-10-25 LAB
ALBUMIN SERPL BCP-MCNC: 3.7 G/DL (ref 3.5–5.2)
ALP SERPL-CCNC: 54 U/L (ref 55–135)
ALT SERPL W/O P-5'-P-CCNC: 34 U/L (ref 10–44)
ANION GAP SERPL CALC-SCNC: 10 MMOL/L (ref 8–16)
AST SERPL-CCNC: 50 U/L (ref 10–40)
BASOPHILS # BLD AUTO: 0.02 K/UL (ref 0–0.2)
BASOPHILS NFR BLD: 0.3 % (ref 0–1.9)
BILIRUB SERPL-MCNC: 0.8 MG/DL (ref 0.1–1)
BNP SERPL-MCNC: 30 PG/ML (ref 0–99)
BUN SERPL-MCNC: 8 MG/DL (ref 6–20)
CALCIUM SERPL-MCNC: 9.4 MG/DL (ref 8.7–10.5)
CHLORIDE SERPL-SCNC: 101 MMOL/L (ref 95–110)
CO2 SERPL-SCNC: 25 MMOL/L (ref 23–29)
CREAT SERPL-MCNC: 1 MG/DL (ref 0.5–1.4)
DIFFERENTIAL METHOD: ABNORMAL
EOSINOPHIL # BLD AUTO: 0 K/UL (ref 0–0.5)
EOSINOPHIL NFR BLD: 0.3 % (ref 0–8)
ERYTHROCYTE [DISTWIDTH] IN BLOOD BY AUTOMATED COUNT: 13.4 % (ref 11.5–14.5)
EST. GFR  (NO RACE VARIABLE): >60 ML/MIN/1.73 M^2
GLUCOSE SERPL-MCNC: 114 MG/DL (ref 70–110)
HCT VFR BLD AUTO: 43.4 % (ref 40–54)
HCV AB SERPL QL IA: NORMAL
HGB BLD-MCNC: 14.5 G/DL (ref 14–18)
HIV 1+2 AB+HIV1 P24 AG SERPL QL IA: NORMAL
IMM GRANULOCYTES # BLD AUTO: 0.02 K/UL (ref 0–0.04)
IMM GRANULOCYTES NFR BLD AUTO: 0.3 % (ref 0–0.5)
LYMPHOCYTES # BLD AUTO: 1.4 K/UL (ref 1–4.8)
LYMPHOCYTES NFR BLD: 23.4 % (ref 18–48)
MAGNESIUM SERPL-MCNC: 1.7 MG/DL (ref 1.6–2.6)
MCH RBC QN AUTO: 31.9 PG (ref 27–31)
MCHC RBC AUTO-ENTMCNC: 33.4 G/DL (ref 32–36)
MCV RBC AUTO: 95 FL (ref 82–98)
MONOCYTES # BLD AUTO: 0.5 K/UL (ref 0.3–1)
MONOCYTES NFR BLD: 7.8 % (ref 4–15)
NEUTROPHILS # BLD AUTO: 4.2 K/UL (ref 1.8–7.7)
NEUTROPHILS NFR BLD: 67.9 % (ref 38–73)
NRBC BLD-RTO: 0 /100 WBC
PLATELET # BLD AUTO: 274 K/UL (ref 150–450)
PMV BLD AUTO: 9.8 FL (ref 9.2–12.9)
POTASSIUM SERPL-SCNC: 3.6 MMOL/L (ref 3.5–5.1)
PROT SERPL-MCNC: 6.9 G/DL (ref 6–8.4)
RBC # BLD AUTO: 4.55 M/UL (ref 4.6–6.2)
SODIUM SERPL-SCNC: 136 MMOL/L (ref 136–145)
TROPONIN I SERPL DL<=0.01 NG/ML-MCNC: 0.01 NG/ML (ref 0–0.03)
TSH SERPL DL<=0.005 MIU/L-ACNC: 0.61 UIU/ML (ref 0.4–4)
WBC # BLD AUTO: 6.12 K/UL (ref 3.9–12.7)

## 2023-10-25 PROCEDURE — 25000003 PHARM REV CODE 250: Performed by: EMERGENCY MEDICINE

## 2023-10-25 PROCEDURE — 3080F DIAST BP >= 90 MM HG: CPT | Mod: CPTII,S$GLB,, | Performed by: STUDENT IN AN ORGANIZED HEALTH CARE EDUCATION/TRAINING PROGRAM

## 2023-10-25 PROCEDURE — 1159F PR MEDICATION LIST DOCUMENTED IN MEDICAL RECORD: ICD-10-PCS | Mod: CPTII,S$GLB,, | Performed by: STUDENT IN AN ORGANIZED HEALTH CARE EDUCATION/TRAINING PROGRAM

## 2023-10-25 PROCEDURE — 3044F PR MOST RECENT HEMOGLOBIN A1C LEVEL <7.0%: ICD-10-PCS | Mod: CPTII,S$GLB,, | Performed by: STUDENT IN AN ORGANIZED HEALTH CARE EDUCATION/TRAINING PROGRAM

## 2023-10-25 PROCEDURE — 90792 PR PSYCHIATRIC DIAGNOSTIC EVALUATION W/MEDICAL SERVICES: ICD-10-PCS | Mod: S$GLB,,, | Performed by: STUDENT IN AN ORGANIZED HEALTH CARE EDUCATION/TRAINING PROGRAM

## 2023-10-25 PROCEDURE — 4010F PR ACE/ARB THEARPY RXD/TAKEN: ICD-10-PCS | Mod: CPTII,S$GLB,, | Performed by: STUDENT IN AN ORGANIZED HEALTH CARE EDUCATION/TRAINING PROGRAM

## 2023-10-25 PROCEDURE — 99999 PR PBB SHADOW E&M-EST. PATIENT-LVL III: ICD-10-PCS | Mod: PBBFAC,,, | Performed by: STUDENT IN AN ORGANIZED HEALTH CARE EDUCATION/TRAINING PROGRAM

## 2023-10-25 PROCEDURE — 3077F PR MOST RECENT SYSTOLIC BLOOD PRESSURE >= 140 MM HG: ICD-10-PCS | Mod: CPTII,S$GLB,, | Performed by: STUDENT IN AN ORGANIZED HEALTH CARE EDUCATION/TRAINING PROGRAM

## 2023-10-25 PROCEDURE — 1160F RVW MEDS BY RX/DR IN RCRD: CPT | Mod: CPTII,S$GLB,, | Performed by: STUDENT IN AN ORGANIZED HEALTH CARE EDUCATION/TRAINING PROGRAM

## 2023-10-25 PROCEDURE — 93010 ELECTROCARDIOGRAM REPORT: CPT | Mod: 76,,, | Performed by: INTERNAL MEDICINE

## 2023-10-25 PROCEDURE — 3044F HG A1C LEVEL LT 7.0%: CPT | Mod: CPTII,S$GLB,, | Performed by: STUDENT IN AN ORGANIZED HEALTH CARE EDUCATION/TRAINING PROGRAM

## 2023-10-25 PROCEDURE — 83735 ASSAY OF MAGNESIUM: CPT | Performed by: EMERGENCY MEDICINE

## 2023-10-25 PROCEDURE — 87389 HIV-1 AG W/HIV-1&-2 AB AG IA: CPT | Performed by: EMERGENCY MEDICINE

## 2023-10-25 PROCEDURE — 83880 ASSAY OF NATRIURETIC PEPTIDE: CPT | Performed by: EMERGENCY MEDICINE

## 2023-10-25 PROCEDURE — 84443 ASSAY THYROID STIM HORMONE: CPT | Performed by: EMERGENCY MEDICINE

## 2023-10-25 PROCEDURE — 86803 HEPATITIS C AB TEST: CPT | Performed by: EMERGENCY MEDICINE

## 2023-10-25 PROCEDURE — 90792 PSYCH DIAG EVAL W/MED SRVCS: CPT | Mod: S$GLB,,, | Performed by: STUDENT IN AN ORGANIZED HEALTH CARE EDUCATION/TRAINING PROGRAM

## 2023-10-25 PROCEDURE — 1159F MED LIST DOCD IN RCRD: CPT | Mod: CPTII,S$GLB,, | Performed by: STUDENT IN AN ORGANIZED HEALTH CARE EDUCATION/TRAINING PROGRAM

## 2023-10-25 PROCEDURE — 99999 PR PBB SHADOW E&M-EST. PATIENT-LVL III: CPT | Mod: PBBFAC,,, | Performed by: STUDENT IN AN ORGANIZED HEALTH CARE EDUCATION/TRAINING PROGRAM

## 2023-10-25 PROCEDURE — 80053 COMPREHEN METABOLIC PANEL: CPT | Performed by: EMERGENCY MEDICINE

## 2023-10-25 PROCEDURE — 93005 ELECTROCARDIOGRAM TRACING: CPT

## 2023-10-25 PROCEDURE — 1160F PR REVIEW ALL MEDS BY PRESCRIBER/CLIN PHARMACIST DOCUMENTED: ICD-10-PCS | Mod: CPTII,S$GLB,, | Performed by: STUDENT IN AN ORGANIZED HEALTH CARE EDUCATION/TRAINING PROGRAM

## 2023-10-25 PROCEDURE — 99285 EMERGENCY DEPT VISIT HI MDM: CPT | Mod: 25

## 2023-10-25 PROCEDURE — 3080F PR MOST RECENT DIASTOLIC BLOOD PRESSURE >= 90 MM HG: ICD-10-PCS | Mod: CPTII,S$GLB,, | Performed by: STUDENT IN AN ORGANIZED HEALTH CARE EDUCATION/TRAINING PROGRAM

## 2023-10-25 PROCEDURE — 84484 ASSAY OF TROPONIN QUANT: CPT | Performed by: EMERGENCY MEDICINE

## 2023-10-25 PROCEDURE — 4010F ACE/ARB THERAPY RXD/TAKEN: CPT | Mod: CPTII,S$GLB,, | Performed by: STUDENT IN AN ORGANIZED HEALTH CARE EDUCATION/TRAINING PROGRAM

## 2023-10-25 PROCEDURE — 85025 COMPLETE CBC W/AUTO DIFF WBC: CPT | Performed by: EMERGENCY MEDICINE

## 2023-10-25 PROCEDURE — 93010 EKG 12-LEAD: ICD-10-PCS | Mod: 76,,, | Performed by: INTERNAL MEDICINE

## 2023-10-25 PROCEDURE — 3077F SYST BP >= 140 MM HG: CPT | Mod: CPTII,S$GLB,, | Performed by: STUDENT IN AN ORGANIZED HEALTH CARE EDUCATION/TRAINING PROGRAM

## 2023-10-25 RX ORDER — SERTRALINE HYDROCHLORIDE 50 MG/1
50 TABLET, FILM COATED ORAL DAILY
Qty: 30 TABLET | Refills: 1 | Status: SHIPPED | OUTPATIENT
Start: 2023-10-25 | End: 2024-02-29

## 2023-10-25 RX ORDER — METOPROLOL SUCCINATE 25 MG/1
25 TABLET, EXTENDED RELEASE ORAL DAILY
Qty: 30 TABLET | Refills: 11 | Status: SHIPPED | OUTPATIENT
Start: 2023-10-25 | End: 2023-11-13 | Stop reason: SDUPTHER

## 2023-10-25 RX ORDER — METOPROLOL TARTRATE 25 MG/1
25 TABLET, FILM COATED ORAL
Status: COMPLETED | OUTPATIENT
Start: 2023-10-25 | End: 2023-10-25

## 2023-10-25 RX ADMIN — METOPROLOL TARTRATE 25 MG: 25 TABLET, FILM COATED ORAL at 06:10

## 2023-10-25 NOTE — ED PROVIDER NOTES
Encounter Date: 10/25/2023       History     Chief Complaint   Patient presents with    Palpitations     47-year-old male history of hypertension, on hydrochlorothiazide and losartan, complaining of palpitations since around 2:00 p.m. this afternoon associated with lightheadedness and tingling/numbness throughout his body.  He has no history of arrhythmias, has never been diagnosed with atrial fibrillation or a flutter in the past.  He is had no new medications, no recent illnesses.  He only drinks 1-2 beers 3 days a week.  No history of thyroid dysfunction.  He is also reporting some mild increased shortness of breath and dyspnea on exertion.    The history is provided by the patient.     Review of patient's allergies indicates:   Allergen Reactions    Iodine and iodide containing products      Past Medical History:   Diagnosis Date    Arthritis     Hypertension      Past Surgical History:   Procedure Laterality Date    gastic bypass       Family History   Problem Relation Age of Onset    Hypertension Mother     Diabetes Mother     Hypertension Father      Social History     Tobacco Use    Smoking status: Former     Current packs/day: 0.00     Average packs/day: 0.1 packs/day for 9.0 years (0.9 ttl pk-yrs)     Types: Cigarettes     Start date: 2012     Quit date: 2021     Years since quittin.8    Smokeless tobacco: Former    Tobacco comments:     Stopped smoking 3 weeks ago (today 1/3/22), using nicotine vape throughout the day   Substance Use Topics    Alcohol use: Yes    Drug use: No     Review of Systems    Physical Exam     Initial Vitals [10/25/23 1719]   BP Pulse Resp Temp SpO2   (!) 146/92 (!) 130 20 98.3 °F (36.8 °C) 98 %      MAP       --         Physical Exam    Nursing note and vitals reviewed.  Constitutional: Vital signs are normal. He appears well-developed and well-nourished. He is not diaphoretic.  Non-toxic appearance. He does not appear ill. No distress.   HENT:   Head: Normocephalic and  atraumatic.   Mouth/Throat: Mucous membranes are normal. Mucous membranes are not dry.   Eyes: Conjunctivae and lids are normal.   Neck: Neck supple.   Normal range of motion.  Cardiovascular:  Normal rate, regular rhythm and normal heart sounds.           No murmur heard.  Pulmonary/Chest: Breath sounds normal.   Musculoskeletal:         General: No edema. Normal range of motion.      Cervical back: Normal range of motion and neck supple.     Neurological: He is alert and oriented to person, place, and time. He has normal strength.   Skin: Skin is warm, dry and intact. No pallor.   Psychiatric: He has a normal mood and affect. His speech is normal and behavior is normal.         ED Course   Procedures  Labs Reviewed   CBC W/ AUTO DIFFERENTIAL - Abnormal; Notable for the following components:       Result Value    RBC 4.55 (*)     MCH 31.9 (*)     All other components within normal limits    Narrative:     Release to patient->Immediate   COMPREHENSIVE METABOLIC PANEL - Abnormal; Notable for the following components:    Glucose 114 (*)     Alkaline Phosphatase 54 (*)     AST 50 (*)     All other components within normal limits    Narrative:     Release to patient->Immediate   HIV 1 / 2 ANTIBODY    Narrative:     Release to patient->Immediate   HEPATITIS C ANTIBODY    Narrative:     Release to patient->Immediate   B-TYPE NATRIURETIC PEPTIDE    Narrative:     Release to patient->Immediate   MAGNESIUM    Narrative:     Release to patient->Immediate   TROPONIN I    Narrative:     Release to patient->Immediate   TSH    Narrative:     Release to patient->Immediate     EKG Readings: (Independently Interpreted)   Initial EKG with a flutter with a 2-1 block       Imaging Results              X-Ray Chest AP Portable (Final result)  Result time 10/25/23 19:52:40      Final result by Santo Augustin MD (10/25/23 19:52:40)                   Impression:      No acute cardiopulmonary abnormality.    Electronically signed by resident:  Gabe Barron  Date:    10/25/2023  Time:    19:33    Electronically signed by: Santo Augustin MD  Date:    10/25/2023  Time:    19:52               Narrative:    EXAMINATION:  XR CHEST AP PORTABLE    CLINICAL HISTORY:  Atrial fibrillation.    TECHNIQUE:  Single frontal portable view of the chest was performed.    COMPARISON:  Chest radiograph 06/10/2020.    FINDINGS:  Cardiac monitoring wires project over the chest.    The lungs are clear. No focal consolidation. No significant pleural effusion. No pneumothorax.    The cardiomediastinal silhouette is normal in size.                                       Medications   metoprolol tartrate (LOPRESSOR) tablet 25 mg (25 mg Oral Given 10/25/23 1829)     Medical Decision Making  On arrival patient with palpitations and EKG able to capture that patient was in a flutter with a 2-1 block, blood pressure okay though.  When patient placed in room and on monitor and I assessed him at bedside, the atrial flutter had resolved and patient was back to normal sinus rhythm and he reported that his symptoms have resolved as well.    Differential diagnosis for palpitations includes dysrhythmia, hyperthyroidism, anxiety/panic attacks, electrolyte disturbance, excess caffeine use.  Suspicion that patient has been in and out of a flutter this afternoon and this has been the reason for his palpitations    Plan  -will check labs including CBC, CMP, magnesium, TSH  -no obvious trigger based on my discussion with the patient for being in a flutter  -cardiac monitor      Amount and/or Complexity of Data Reviewed  External Data Reviewed: labs and notes.  Labs: ordered. Decision-making details documented in ED Course.  Radiology: ordered and independent interpretation performed. Decision-making details documented in ED Course.  ECG/medicine tests: ordered and independent interpretation performed. Decision-making details documented in ED Course.    Risk  Prescription drug management.  Decision  regarding hospitalization.               ED Course as of 10/25/23 2053   Wed Oct 25, 2023   1900 Troponin I: 0.006 [AS]   1900 BNP: 30 [AS]   2012 Labs reviewed, all unremarkable.  No recurrent episodes a flutter in the ED.  CHADVAS 1 so will start on eliquis and will start on metoprolol 25 mg daily, based on most recent ED-cardiology a fib protocol.  Will also place referral for EP evaluation.  Long d/w pt and family about plan for d/c home and ED return precautions. [AS]      ED Course User Index  [AS] Chasity Ocampo MD                    Clinical Impression:   Final diagnoses:  [R00.2] Palpitations  [I48.92] Atrial flutter, unspecified type (Primary)        ED Disposition Condition    Discharge Stable          ED Prescriptions       Medication Sig Dispense Start Date End Date Auth. Provider    apixaban (ELIQUIS) 5 mg Tab Take 1 tablet (5 mg total) by mouth 2 (two) times daily. 60 tablet 10/25/2023 -- Chasity Ocampo MD    metoprolol succinate (TOPROL-XL) 25 MG 24 hr tablet Take 1 tablet (25 mg total) by mouth once daily. 30 tablet 10/25/2023 10/24/2024 Chasity Ocampo MD          Follow-up Information       Follow up With Specialties Details Why Contact Info    [facility] - Emergency Dept Emergency Medicine  If symptoms worsen [address] [place], [place] [id number] g  [facility] [id number] [phone]    [facility] - Cardiology Cardiology Schedule an appointment as soon as possible for a visit   [facility]ermit maria g  [facility] [id number] [phone]             Chasity Ocampo MD  10/25/23 2053

## 2023-10-25 NOTE — Clinical Note
"Shane"Anderson Savage was seen and treated in our emergency department on 10/25/2023.  He may return to work on 10/27/2023.       If you have any questions or concerns, please don't hesitate to call.      Chasity Ocampo MD"

## 2023-11-01 NOTE — PROGRESS NOTES
OUTPATIENT PSYCHIATRY INITIAL VISIT    ENCOUNTER DATE:  11/1/2023  SITE:  [facility]  REFFERAL SOURCE:  Nuno White*  LENGTH OF SESSION:  60 minutes        CHIEF COMPLAINT:   depression    HISTORY OF PRESENTING ILLNESS:  Shane Savage is a 47 y.o. male with no reported psychiatric history who presents for initial assessment.      History as told by Patient - & or family/friend/spouse/caregiver with pts permission  Patient states he recently got out of a 15 year relationship and had to move his sons and himself back in with his parents. This has led to him having depressed mood with associated difficulty with sleeping, decreased energy, diminished appetite, anhedonia, and feeling guilty about his emotions. Patient denies any suicide attempts in the past, but states that he has had passive suicidal thoughts in the past few months. Informed patient of [employer] hotline and instructed to present to the nearest ED if active suicidal ideations arise. He admits to owning a gun that is now with him at his parents house. Patient advised to remove gun from his home and counseled on gun safety. Patient expresses interest in starting therapy amd is interested in starting medications to help with his symptoms. Educated patient on SSRIs, specifically Zoloft, and discussed risks and benefits. He was agreeable with trial and denied any other questions at that time.      PSYCHIATRIC REVIEW OF SYMPTOMS: (Is patient experiencing or having changes in any of the following?)    Symptoms of Depression: diminished mood or loss of interest/anhedonia, diminished energy, change in sleep, change in appetite, excessive guilt, suicidal ideations - Passive, Self injurious behaviors- no, PHQ-9 = 17    Symptoms of ED: denies symptoms, ED-7 = 13    Symptoms of Panic Attacks: denies    Symptoms of florentino or hypomania: denies    Symptoms of psychosis: denies    Symptoms of PTSD: denies      Risk  Parameters:  Patient reports suicidal ideation: passive a couple times in recent months, no active  Patient reports no homicidal ideation  Patient reports no self-injurious behavior  Patient reports no violent behavior    PSYCHIATRIC MED REVIEW    Current psych meds  Medication side effects:  n/a  Medication compliance:  n/a    Previous psych meds trials    PAST PSYCHIATRIC HISTORY:  Previous Psychiatric Diagnoses:  none  Previous Psychiatric Hospitalizations:  none   Previous SI/HI: passive thoughts a couple times in recent months  Previous Suicide Attempts:  none  Previous Self injurious behaviors: none  Previous Medication Trials:  none  Psychiatric Care (current & past):  none  History of Psychotherapy:  none  History of Violence:  none    SUBSTANCE ABUSE HISTORY:  Caffeine: did not assess  Tobacco:  1 ppd  Alcohol:  two 25 oz beers 3/7 days per week  Illicit Substances:  cannabis 2-3/week  Misuse of Prescription Medications:  denies  Other: Herbal supplements/ online supplements: denies    FAMILY HISTORY:  Psychiatric:  dad with PTSD  Family H/o suicide:  no, but friend with suicide attempt    SOCIAL HISTORY:  Developmental/Childhood: did not assess  Education: some college at [employer]  Employment Status/Finances:Employed,  at [employer] system  Relationship Status/Sexual Orientation: Partnered: Recent breakup  Children: 2  Housing Status:  with parents in home     history:  did not assess     Access to Firearms: YES  Sikh:Actively participates in organized [employer]  Recreational activities: family time, cooking, singing    LEGAL HISTORY:   Past charges/incarcerations: No   Pending charges:No     NEUROLOGIC HISTORY:  Seizures:  no   Head trauma:  no    MEDICAL REVIEW OF SYSTEMS:  Complete review of systems performed covering Constitutional, Cardiovascular, Respiratory, Gastrointestinal, Musculoskeletal, Skin, Neurologic and Endocrine  All systems negative.    MEDICAL HISTORY:  Past  Medical History:   Diagnosis Date    Arthritis     Hypertension        ALL MEDICATIONS:    Current Outpatient Medications:     apixaban (ELIQUIS) 5 mg Tab, Take 1 tablet (5 mg total) by mouth 2 (two) times daily., Disp: 60 tablet, Rfl: 1    ascorbic acid, vitamin C, (VITAMIN C) 1000 MG tablet, Take 1,000 mg by mouth once daily., Disp: , Rfl:     cyanocobalamin, vitamin B-12, (CYANOCOBALAMIN,VIT B-12,,BULK, MISC), 1 tablet by Misc.(Non-Drug; Combo Route) route once daily., Disp: , Rfl:     ergocalciferol, vitamin D2, (VITAMIN D ORAL), Take 1 tablet by mouth once daily., Disp: , Rfl:     hydroCHLOROthiazide (HYDRODIURIL) 25 MG tablet, Take 1 tablet (25 mg total) by mouth once daily., Disp: 90 tablet, Rfl: 3    losartan (COZAAR) 25 MG tablet, Take 1 tablet (25 mg total) by mouth once daily., Disp: 90 tablet, Rfl: 0    metoprolol succinate (TOPROL-XL) 25 MG 24 hr tablet, Take 1 tablet (25 mg total) by mouth once daily., Disp: 30 tablet, Rfl: 11    omega-3 fatty acids/fish oil (FISH OIL-OMEGA-3 FATTY ACIDS) 300-1,000 mg capsule, Take 1 capsule by mouth once daily., Disp: , Rfl:     sertraline (ZOLOFT) 50 MG tablet, Take 1 tablet (50 mg total) by mouth once daily., Disp: 30 tablet, Rfl: 1    ALLERGIES:  Review of patient's allergies indicates:   Allergen Reactions    Iodine and iodide containing products        RELEVANT LABS/STUDIES:    Lab Results   Component Value Date    WBC 6.12 10/25/2023    HGB 14.5 10/25/2023    HCT 43.4 10/25/2023    MCV 95 10/25/2023     10/25/2023     BMP  Lab Results   Component Value Date     10/25/2023    K 3.6 10/25/2023     10/25/2023    CO2 25 10/25/2023    BUN 8 10/25/2023    CREATININE 1.0 10/25/2023    CALCIUM 9.4 10/25/2023    ANIONGAP 10 10/25/2023    ESTGFRAFRICA >60.0 04/19/2022    EGFRNONAA >60.0 04/19/2022     Lab Results   Component Value Date    ALT 34 10/25/2023    AST 50 (H) 10/25/2023    ALKPHOS 54 (L) 10/25/2023    BILITOT 0.8 10/25/2023     Lab Results    Component Value Date    TSH 0.608 10/25/2023     Lab Results   Component Value Date    HGBA1C 5.2 08/02/2023         VITALS  Vitals:    10/25/23 0951   BP: (!) 149/91   Pulse: 75   Weight: 126.6 kg (279 lb 3.4 oz)       PHYSICAL EXAM  General: well developed, well nourished  Neurologic:   Gait: Normal   Psychomotor signs:  No involuntary movements or tremor  AIMS: none    PSYCHIATRIC EXAM:    Mental Status Exam:  Appearance: unremarkable, age appropriate, casually dressed  Behavior/Cooperation: limited/ appropriate normal, cooperative, eye contact normal  Speech:  normal tone, normal rate, normal pitch, normal volume  Language: uses words appropriately; NO aphasia or dysarthria  Mood: depressed  Affect:  mood-congruent  Thought Process: normal and logical  Thought Content: normal, no suicidality, no homicidality, delusions, or paranoia currently  Level of Consciousness: Alert and Oriented x3  Memory:  Intact   3/3 immediate, 3/3 at 5 minutes    Recent: Intact; able to report recent events   Remote: Intact; Named 4/4 past presidents   Attention/concentration: appropriate for age/education.   Fund of Knowledge: appears adequate  Insight: Intact  Judgment: Intact       IMPRESSION:    Shane Savage is a 47 y.o. male with no prior psychiatric history who presents for initial assessment.    DIAGNOSES:    ICD-10-CM ICD-9-CM   1. Current moderate episode of major depressive disorder, unspecified whether recurrent  F32.1 296.22       PLAN:  Psych Med:  Start Zoloft 50 mg PO Daily  Discussed diagnosis, risks and benefits of proposed treatment vs alternative treatments vs no treatment, potential side effects of these treatments specifically for: Zoloft including but not limited to decreased appetite, dry mouth, sweating, shakiness, dizziness, nausea, constipation, sexual side effects in men or women, sleepiness, increased suicidal ideation and serotonin syndrome which can lead to rhabdomyolysis, coma and death which was  described as tremor, agitation, exaggerated reflexes, rhythmic muscle spasms (clonus) and a fever which often occurs hours to within one day of starting or increasing the dose of a medication.  List of therapist provided to patient to establish care  Discussed with patient informed consent, risks vs. benefits, alternative treatments, side effect profile and the inherent unpredictability of individual responses to these treatments. Answered any questions patient may have had. The patient expresses understanding of the above and displays the capacity to agree with this current plan     Other: none    RETURN TO CLINIC:  1 month       Abhi Lyons MD  [facility], PGY-3

## 2023-11-06 DIAGNOSIS — I10 ESSENTIAL HYPERTENSION: ICD-10-CM

## 2023-11-06 RX ORDER — LOSARTAN POTASSIUM 25 MG/1
25 TABLET ORAL
Qty: 90 TABLET | Refills: 3 | Status: SHIPPED | OUTPATIENT
Start: 2023-11-06

## 2023-11-13 ENCOUNTER — OFFICE VISIT (OUTPATIENT)
Dept: CARDIOLOGY | Facility: CLINIC | Age: 48
End: 2023-11-13
Payer: COMMERCIAL

## 2023-11-13 VITALS
OXYGEN SATURATION: 97 % | HEIGHT: 73 IN | DIASTOLIC BLOOD PRESSURE: 80 MMHG | HEART RATE: 61 BPM | SYSTOLIC BLOOD PRESSURE: 120 MMHG | BODY MASS INDEX: 35.7 KG/M2 | WEIGHT: 269.38 LBS

## 2023-11-13 DIAGNOSIS — Z82.49 FAMILY HISTORY OF EARLY CAD: ICD-10-CM

## 2023-11-13 DIAGNOSIS — E66.01 SEVERE OBESITY WITH BODY MASS INDEX (BMI) OF 35.0 TO 39.9 WITH COMORBIDITY: ICD-10-CM

## 2023-11-13 DIAGNOSIS — F17.200 CURRENT EVERY DAY SMOKER: ICD-10-CM

## 2023-11-13 DIAGNOSIS — I48.92 ATRIAL FLUTTER, UNSPECIFIED TYPE: Primary | ICD-10-CM

## 2023-11-13 DIAGNOSIS — I10 ESSENTIAL HYPERTENSION: ICD-10-CM

## 2023-11-13 DIAGNOSIS — Z13.6 ENCOUNTER FOR SCREENING FOR CARDIOVASCULAR DISORDERS: ICD-10-CM

## 2023-11-13 PROCEDURE — 3074F PR MOST RECENT SYSTOLIC BLOOD PRESSURE < 130 MM HG: ICD-10-PCS | Mod: CPTII,S$GLB,, | Performed by: INTERNAL MEDICINE

## 2023-11-13 PROCEDURE — 99999 PR PBB SHADOW E&M-EST. PATIENT-LVL V: CPT | Mod: PBBFAC,,, | Performed by: INTERNAL MEDICINE

## 2023-11-13 PROCEDURE — 4010F ACE/ARB THERAPY RXD/TAKEN: CPT | Mod: CPTII,S$GLB,, | Performed by: INTERNAL MEDICINE

## 2023-11-13 PROCEDURE — 3074F SYST BP LT 130 MM HG: CPT | Mod: CPTII,S$GLB,, | Performed by: INTERNAL MEDICINE

## 2023-11-13 PROCEDURE — 99204 OFFICE O/P NEW MOD 45 MIN: CPT | Mod: S$GLB,,, | Performed by: INTERNAL MEDICINE

## 2023-11-13 PROCEDURE — 3008F PR BODY MASS INDEX (BMI) DOCUMENTED: ICD-10-PCS | Mod: CPTII,S$GLB,, | Performed by: INTERNAL MEDICINE

## 2023-11-13 PROCEDURE — 4010F PR ACE/ARB THEARPY RXD/TAKEN: ICD-10-PCS | Mod: CPTII,S$GLB,, | Performed by: INTERNAL MEDICINE

## 2023-11-13 PROCEDURE — 3008F BODY MASS INDEX DOCD: CPT | Mod: CPTII,S$GLB,, | Performed by: INTERNAL MEDICINE

## 2023-11-13 PROCEDURE — 3044F HG A1C LEVEL LT 7.0%: CPT | Mod: CPTII,S$GLB,, | Performed by: INTERNAL MEDICINE

## 2023-11-13 PROCEDURE — 99204 PR OFFICE/OUTPT VISIT, NEW, LEVL IV, 45-59 MIN: ICD-10-PCS | Mod: S$GLB,,, | Performed by: INTERNAL MEDICINE

## 2023-11-13 PROCEDURE — 99999 PR PBB SHADOW E&M-EST. PATIENT-LVL V: ICD-10-PCS | Mod: PBBFAC,,, | Performed by: INTERNAL MEDICINE

## 2023-11-13 PROCEDURE — 3079F DIAST BP 80-89 MM HG: CPT | Mod: CPTII,S$GLB,, | Performed by: INTERNAL MEDICINE

## 2023-11-13 PROCEDURE — 1159F MED LIST DOCD IN RCRD: CPT | Mod: CPTII,S$GLB,, | Performed by: INTERNAL MEDICINE

## 2023-11-13 PROCEDURE — 3044F PR MOST RECENT HEMOGLOBIN A1C LEVEL <7.0%: ICD-10-PCS | Mod: CPTII,S$GLB,, | Performed by: INTERNAL MEDICINE

## 2023-11-13 PROCEDURE — 3079F PR MOST RECENT DIASTOLIC BLOOD PRESSURE 80-89 MM HG: ICD-10-PCS | Mod: CPTII,S$GLB,, | Performed by: INTERNAL MEDICINE

## 2023-11-13 PROCEDURE — 1159F PR MEDICATION LIST DOCUMENTED IN MEDICAL RECORD: ICD-10-PCS | Mod: CPTII,S$GLB,, | Performed by: INTERNAL MEDICINE

## 2023-11-13 RX ORDER — METOPROLOL SUCCINATE 25 MG/1
50 TABLET, EXTENDED RELEASE ORAL DAILY
Qty: 60 TABLET | Refills: 11 | Status: SHIPPED | OUTPATIENT
Start: 2023-11-13 | End: 2023-11-13

## 2023-11-13 RX ORDER — METOPROLOL SUCCINATE 50 MG/1
50 TABLET, EXTENDED RELEASE ORAL DAILY
Qty: 90 TABLET | Refills: 3 | Status: SHIPPED | OUTPATIENT
Start: 2023-11-13 | End: 2024-11-12

## 2023-11-13 NOTE — TELEPHONE ENCOUNTER
----- Message from Winter Finley sent at 11/13/2023  8:53 AM CST -----  Regarding: Meds  Patient insurance not covering metoprolol succinate (TOPROL-XL) 25 MG 24 hr tablet but they approving the 50 mg. Please call back @ [phone]. Thank you Winter

## 2023-11-13 NOTE — PROGRESS NOTES
Subjective:    Patient ID:  Shane Savage is a 47 y.o. male who presents for evaluation of atrial flutter    HPI  The patient is a 47 year old male presented to the ED 10/25/23  complaining of palpitations since around 2:00 p.m. this afternoon associated with lightheadedness and tingling/numbness throughout his body.  He has no history of arrhythmias, has never been diagnosed with atrial fibrillation or a flutter in the past . He has hypertension, obesity and drinks 2-3 beer 3 times a week. On presentation to the ED he was in atrial flutter with 2:1 AV block rate 147. It reverted to sinus spontaneous while in the ED. He is CHADS 1. He is a smoker while a  for [employer] and active he does not exercise.  His father had  MI and stents in his 50s. He post gastric by pass [ down 200 lbs] and previously had RADHA.. He denies chest pain or GALEANA.  Lab Results   Component Value Date     10/25/2023    K 3.6 10/25/2023     10/25/2023    CO2 25 10/25/2023    BUN 8 10/25/2023    CREATININE 1.0 10/25/2023     (H) 10/25/2023    HGBA1C 5.2 08/02/2023    MG 1.7 10/25/2023    AST 50 (H) 10/25/2023    ALT 34 10/25/2023    ALBUMIN 3.7 10/25/2023    PROT 6.9 10/25/2023    BILITOT 0.8 10/25/2023    WBC 6.12 10/25/2023    HGB 14.5 10/25/2023    HCT 43.4 10/25/2023    MCV 95 10/25/2023     10/25/2023    INR 1.1 09/15/2009    PSA 0.51 06/07/2021    TSH 0.608 10/25/2023         Lab Results   Component Value Date    CHOL 129 01/24/2023    HDL 51 01/24/2023    TRIG 99 01/24/2023       Lab Results   Component Value Date    LDLCALC 58.2 (L) 01/24/2023       Past Medical History:   Diagnosis Date    Arthritis     Hypertension        Current Outpatient Medications:     ascorbic acid, vitamin C, (VITAMIN C) 1000 MG tablet, Take 1,000 mg by mouth once daily., Disp: , Rfl:     cyanocobalamin, vitamin B-12, (CYANOCOBALAMIN,VIT B-12,,BULK, MISC), 1 tablet by Misc.(Non-Drug; Combo Route) route once daily.,  Disp: , Rfl:     ergocalciferol, vitamin D2, (VITAMIN D ORAL), Take 1 tablet by mouth once daily., Disp: , Rfl:     hydroCHLOROthiazide (HYDRODIURIL) 25 MG tablet, Take 1 tablet (25 mg total) by mouth once daily., Disp: 90 tablet, Rfl: 3    losartan (COZAAR) 25 MG tablet, Take 1 tablet by mouth once daily, Disp: 90 tablet, Rfl: 3    omega-3 fatty acids/fish oil (FISH OIL-OMEGA-3 FATTY ACIDS) 300-1,000 mg capsule, Take 1 capsule by mouth once daily., Disp: , Rfl:     sertraline (ZOLOFT) 50 MG tablet, Take 1 tablet (50 mg total) by mouth once daily., Disp: 30 tablet, Rfl: 1    metoprolol succinate (TOPROL-XL) 50 MG 24 hr tablet, Take 1 tablet (50 mg total) by mouth once daily., Disp: 90 tablet, Rfl: 3          Review of Systems   Constitutional: Negative for decreased appetite, diaphoresis, fever, malaise/fatigue, weight gain and weight loss.   HENT:  Negative for congestion, ear discharge, ear pain and nosebleeds.    Eyes:  Negative for blurred vision, double vision and visual disturbance.   Cardiovascular:  Negative for chest pain, claudication, cyanosis, dyspnea on exertion, irregular heartbeat, leg swelling, near-syncope, orthopnea, palpitations, paroxysmal nocturnal dyspnea and syncope.   Respiratory:  Negative for cough, hemoptysis, shortness of breath, sleep disturbances due to breathing, snoring, sputum production and wheezing.    Endocrine: Negative for polydipsia, polyphagia and polyuria.   Hematologic/Lymphatic: Negative for adenopathy and bleeding problem. Does not bruise/bleed easily.   Skin:  Negative for color change, nail changes, poor wound healing and rash.   Musculoskeletal:  Negative for muscle cramps and muscle weakness.   Gastrointestinal:  Negative for abdominal pain, anorexia, change in bowel habit, hematochezia, nausea and vomiting.   Genitourinary:  Negative for dysuria, frequency and hematuria.   Neurological:  Negative for brief paralysis, difficulty with concentration, excessive daytime  "sleepiness, dizziness, focal weakness, headaches, light-headedness, seizures, vertigo and weakness.   Psychiatric/Behavioral:  Negative for altered mental status and depression.    Allergic/Immunologic: Negative for persistent infections.        Objective:/80   Pulse 61   Ht 6' 1" (1.854 m)   Wt 122.2 kg (269 lb 6.4 oz)   SpO2 97%   BMI 35.54 kg/m²             Physical Exam  Constitutional:       Appearance: He is well-developed. He is obese.   HENT:      Head: Normocephalic.      Right Ear: External ear normal.      Left Ear: External ear normal.      Nose: Nose normal.   Eyes:      General: No scleral icterus.     Pupils: Pupils are equal, round, and reactive to light.   Neck:      Thyroid: No thyromegaly.      Vascular: No JVD.      Trachea: No tracheal deviation.   Cardiovascular:      Rate and Rhythm: Normal rate and regular rhythm.      Pulses: Intact distal pulses.           Carotid pulses are 2+ on the right side and 2+ on the left side.       Dorsalis pedis pulses are 2+ on the right side and 2+ on the left side.      Heart sounds: No murmur heard.     No friction rub. No gallop.   Pulmonary:      Effort: Pulmonary effort is normal.      Breath sounds: Normal breath sounds.   Abdominal:      General: Bowel sounds are normal. There is no distension.      Tenderness: There is no abdominal tenderness. There is no guarding.   Musculoskeletal:         General: No tenderness. Normal range of motion.      Cervical back: Normal range of motion and neck supple.   Lymphadenopathy:      Comments: Palpation of neck and groin lymph nodes normal   Skin:     General: Skin is dry.      Comments: Palpation of skin normal   Neurological:      Mental Status: He is alert and oriented to person, place, and time.      Cranial Nerves: No cranial nerve deficit.      Motor: No abnormal muscle tone.      Coordination: Coordination normal.   Psychiatric:         Behavior: Behavior normal.         Thought Content: Thought " content normal.         Judgment: Judgment normal.           Assessment:       1. Atrial flutter, unspecified type    2. Essential hypertension    3. Severe obesity with body mass index (BMI) of 35.0 to 39.9 with comorbidity    4. Current every day smoker    5. Family history of early CAD    6. Encounter for screening for cardiovascular disorders         Plan:       Shane was seen today for atrial fibrillation.    Diagnoses and all orders for this visit:    Atrial flutter, unspecified type  -     Ambulatory referral/consult to Cardiac Electrophysiology  -     Cardiac event monitor; Future  -     Echo Saline Bubble? No; Future    Essential hypertension    Severe obesity with body mass index (BMI) of 35.0 to 39.9 with comorbidity    Current every day smoker    Family history of early CAD  -     CT Cardiac Scoring; Future; Expected date: 11/13/2023    Encounter for screening for cardiovascular disorders  -     CT Cardiac Scoring; Future; Expected date: 11/13/2023    Other orders  -     Discontinue: metoprolol succinate (TOPROL-XL) 25 MG 24 hr tablet; Take 2 tablets (50 mg total) by mouth once daily.

## 2023-11-14 ENCOUNTER — CLINICAL SUPPORT (OUTPATIENT)
Dept: CARDIOLOGY | Facility: HOSPITAL | Age: 48
End: 2023-11-14
Attending: INTERNAL MEDICINE
Payer: COMMERCIAL

## 2023-11-14 DIAGNOSIS — I48.92 ATRIAL FLUTTER, UNSPECIFIED TYPE: ICD-10-CM

## 2023-11-14 PROCEDURE — 93272 CARDIAC EVENT MONITOR (CUPID ONLY): ICD-10-PCS | Mod: ,,, | Performed by: INTERNAL MEDICINE

## 2023-11-14 PROCEDURE — 93271 ECG/MONITORING AND ANALYSIS: CPT

## 2023-11-14 PROCEDURE — 93272 ECG/REVIEW INTERPRET ONLY: CPT | Mod: ,,, | Performed by: INTERNAL MEDICINE

## 2023-11-14 PROCEDURE — 93270 REMOTE 30 DAY ECG REV/REPORT: CPT

## 2023-11-20 ENCOUNTER — HOSPITAL ENCOUNTER (OUTPATIENT)
Dept: RADIOLOGY | Facility: HOSPITAL | Age: 48
Discharge: HOME | End: 2023-11-20
Attending: INTERNAL MEDICINE
Payer: COMMERCIAL

## 2023-11-20 DIAGNOSIS — Z82.49 FAMILY HISTORY OF EARLY CAD: ICD-10-CM

## 2023-11-20 DIAGNOSIS — Z13.6 ENCOUNTER FOR SCREENING FOR CARDIOVASCULAR DISORDERS: ICD-10-CM

## 2023-11-20 PROCEDURE — 75571 CT HRT W/O DYE W/CA TEST: CPT | Mod: TC

## 2023-11-20 PROCEDURE — 75571 CT HRT W/O DYE W/CA TEST: CPT | Mod: 26,,, | Performed by: RADIOLOGY

## 2023-11-20 PROCEDURE — 75571 CT CALCIUM SCORING CARDIAC: ICD-10-PCS | Mod: 26,,, | Performed by: RADIOLOGY

## 2023-12-09 ENCOUNTER — LAB VISIT (OUTPATIENT)
Dept: LAB | Facility: HOSPITAL | Age: 48
End: 2023-12-09
Attending: INTERNAL MEDICINE
Payer: COMMERCIAL

## 2023-12-09 ENCOUNTER — OFFICE VISIT (OUTPATIENT)
Dept: INTERNAL MEDICINE | Facility: CLINIC | Age: 48
End: 2023-12-09
Payer: COMMERCIAL

## 2023-12-09 VITALS
DIASTOLIC BLOOD PRESSURE: 80 MMHG | HEART RATE: 57 BPM | BODY MASS INDEX: 36.79 KG/M2 | OXYGEN SATURATION: 98 % | SYSTOLIC BLOOD PRESSURE: 122 MMHG | WEIGHT: 277.56 LBS | HEIGHT: 73 IN

## 2023-12-09 DIAGNOSIS — Z11.3 SCREENING FOR VENEREAL DISEASE: ICD-10-CM

## 2023-12-09 DIAGNOSIS — A59.9 TRICHOMONIASIS: Primary | ICD-10-CM

## 2023-12-09 LAB
HCV AB SERPL QL IA: NORMAL
HIV 1+2 AB+HIV1 P24 AG SERPL QL IA: NORMAL

## 2023-12-09 PROCEDURE — 3008F BODY MASS INDEX DOCD: CPT | Mod: CPTII,S$GLB,, | Performed by: INTERNAL MEDICINE

## 2023-12-09 PROCEDURE — 86592 SYPHILIS TEST NON-TREP QUAL: CPT | Performed by: INTERNAL MEDICINE

## 2023-12-09 PROCEDURE — 99999 PR PBB SHADOW E&M-EST. PATIENT-LVL IV: ICD-10-PCS | Mod: PBBFAC,,, | Performed by: INTERNAL MEDICINE

## 2023-12-09 PROCEDURE — 1160F PR REVIEW ALL MEDS BY PRESCRIBER/CLIN PHARMACIST DOCUMENTED: ICD-10-PCS | Mod: CPTII,S$GLB,, | Performed by: INTERNAL MEDICINE

## 2023-12-09 PROCEDURE — 86803 HEPATITIS C AB TEST: CPT | Performed by: INTERNAL MEDICINE

## 2023-12-09 PROCEDURE — 1160F RVW MEDS BY RX/DR IN RCRD: CPT | Mod: CPTII,S$GLB,, | Performed by: INTERNAL MEDICINE

## 2023-12-09 PROCEDURE — 36415 COLL VENOUS BLD VENIPUNCTURE: CPT | Mod: PO | Performed by: INTERNAL MEDICINE

## 2023-12-09 PROCEDURE — 3079F DIAST BP 80-89 MM HG: CPT | Mod: CPTII,S$GLB,, | Performed by: INTERNAL MEDICINE

## 2023-12-09 PROCEDURE — 3074F PR MOST RECENT SYSTOLIC BLOOD PRESSURE < 130 MM HG: ICD-10-PCS | Mod: CPTII,S$GLB,, | Performed by: INTERNAL MEDICINE

## 2023-12-09 PROCEDURE — 3074F SYST BP LT 130 MM HG: CPT | Mod: CPTII,S$GLB,, | Performed by: INTERNAL MEDICINE

## 2023-12-09 PROCEDURE — 4010F ACE/ARB THERAPY RXD/TAKEN: CPT | Mod: CPTII,S$GLB,, | Performed by: INTERNAL MEDICINE

## 2023-12-09 PROCEDURE — 99214 PR OFFICE/OUTPT VISIT, EST, LEVL IV, 30-39 MIN: ICD-10-PCS | Mod: S$GLB,,, | Performed by: INTERNAL MEDICINE

## 2023-12-09 PROCEDURE — 3044F HG A1C LEVEL LT 7.0%: CPT | Mod: CPTII,S$GLB,, | Performed by: INTERNAL MEDICINE

## 2023-12-09 PROCEDURE — 99214 OFFICE O/P EST MOD 30 MIN: CPT | Mod: S$GLB,,, | Performed by: INTERNAL MEDICINE

## 2023-12-09 PROCEDURE — 99999 PR PBB SHADOW E&M-EST. PATIENT-LVL IV: CPT | Mod: PBBFAC,,, | Performed by: INTERNAL MEDICINE

## 2023-12-09 PROCEDURE — 4010F PR ACE/ARB THEARPY RXD/TAKEN: ICD-10-PCS | Mod: CPTII,S$GLB,, | Performed by: INTERNAL MEDICINE

## 2023-12-09 PROCEDURE — 3044F PR MOST RECENT HEMOGLOBIN A1C LEVEL <7.0%: ICD-10-PCS | Mod: CPTII,S$GLB,, | Performed by: INTERNAL MEDICINE

## 2023-12-09 PROCEDURE — 87389 HIV-1 AG W/HIV-1&-2 AB AG IA: CPT | Performed by: INTERNAL MEDICINE

## 2023-12-09 PROCEDURE — 3079F PR MOST RECENT DIASTOLIC BLOOD PRESSURE 80-89 MM HG: ICD-10-PCS | Mod: CPTII,S$GLB,, | Performed by: INTERNAL MEDICINE

## 2023-12-09 PROCEDURE — 3008F PR BODY MASS INDEX (BMI) DOCUMENTED: ICD-10-PCS | Mod: CPTII,S$GLB,, | Performed by: INTERNAL MEDICINE

## 2023-12-09 PROCEDURE — 1159F MED LIST DOCD IN RCRD: CPT | Mod: CPTII,S$GLB,, | Performed by: INTERNAL MEDICINE

## 2023-12-09 PROCEDURE — 1159F PR MEDICATION LIST DOCUMENTED IN MEDICAL RECORD: ICD-10-PCS | Mod: CPTII,S$GLB,, | Performed by: INTERNAL MEDICINE

## 2023-12-09 PROCEDURE — 87491 CHLMYD TRACH DNA AMP PROBE: CPT | Performed by: INTERNAL MEDICINE

## 2023-12-09 RX ORDER — METRONIDAZOLE 500 MG/1
500 TABLET ORAL EVERY 12 HOURS
Qty: 14 TABLET | Refills: 0 | Status: SHIPPED | OUTPATIENT
Start: 2023-12-09 | End: 2023-12-16

## 2023-12-09 NOTE — PROGRESS NOTES
"Assessment:         1. Trichomoniasis    2. Screening for venereal disease          Plan:           Shane was seen today for exposure to std.    Diagnoses and all orders for this visit:    Trichomoniasis    New   Uncontrolled  Signs, symptoms consistent with tricomoniasis exposut   history or pyhsical exam do not suggest UTI  I provided instruction on supportive care measures   Prior tesing reviewed and new testing was was given  begin as below   reviewed signs and symptoms that should prompt return to provider or evaluation in the ED  -     metroNIDAZOLE (FLAGYL) 500 MG tablet; Take 1 tablet (500 mg total) by mouth every 12 (twelve) hours. for 7 days    Screening for venereal disease  -     HIV 1/2 Ag/Ab (4th Gen); Future  -     RPR; Future  -     Hepatitis C Antibody; Future  -     C. trachomatis/N. gonorrhoeae by AMP DNA [facility]; Urine              Subjective:       Patient ID: Shane Savage is a 47 y.o. male.    Chief Complaint: Exposure to STD      Long term partner positive for tricomonas started treatment. He is asymptomatic  He would like std testing .     HPI    Review of Systems   All other systems reviewed and are negative.            Health Maintenance Due   Topic Date Due    Pneumococcal Vaccines (Age 0-64) (2 - PCV) 03/18/2017    Influenza Vaccine (1) Never done         Objective:     /80 (BP Location: Right arm, Patient Position: Sitting, BP Method: Medium (Manual))   Pulse (!) 57   Ht 6' 1" (1.854 m)   Wt 125.9 kg (277 lb 9 oz)   SpO2 98%   BMI 36.62 kg/m²         12/9/2023     9:25 AM 11/13/2023     8:27 AM 10/25/2023     5:19 PM 10/25/2023     9:51 AM 8/30/2023     8:27 AM   Vitals   Height 6' 1" (1.854 m) 6' 1" (1.854 m)   6' 1" (1.854 m)   Weight (lbs) 277.56 269.4 279  291.01   BMI (kg/m2) 36.6 35.6   38.4       Information is confidential and restricted. Go to Review Flowsheets to unlock data.            Physical Exam  Nursing note reviewed.   Constitutional:       General: He is " [facility] -   Infectious diseases evaluation  admission date 10/31/2021    reason for consultation:   Sepsis    Impression :   Current:  · Bandemia  · Streptococcus pneumonia septicemia  · COPD exacerbation  · Left lung diffuse pneumococcal pneumonia -multifocal  · immunosuppressed   · Rhinovirus infection, positive PCR    Other:  · History DVT/PE Eliquis  · Rheumatoid arthritis methotrexate  · Pulmonary adenocarcinoma post left lobe lobectomy end July 2021  Discussion / summary of stay / plan of care   · Patient was admitted recently to the hospital with a right upper lobe pneumonia, 2 weeks she is back with the left worsening pneumonia, she is concerned about an underlying immunity deficiency  · I would like to rule out endocarditis underlying leading to repeated pneumonia  Recommendations   Treating Streptococcus pneumonia pneumonia as well as septicemia, possible complicated left pleural effusion  · Aspirate the left pleural fluid does not seem to be infectious  · Levaquin /day  · 11/2 Add ceftriaxone pend sensi of the pneumococcus due to concern of resistance since she had 2 back to back pneumonias  · Echo look for vegetation -as a cause of relapsed pneumonia     Infection Control Recommendations   · [place] Precautions    Antimicrobial Stewardship Recommendations   · Simplification of therapy  · Targeted therapy    Coordination ofOutpatient Care:   · Estimated Length of IV antimicrobials:  · Patient will need Midline / picc Catheter Insertion:   · Patient will need SNF:  · Patient will need outpatient wound care:     History of Present Illness:   Initial history:  Abril Cameron is a 57y.o.-year-old female with recent admission to the hospital treated for COPD exacerbation. She had a left LL lobectomy for cancer end og July and never felt free of infection -  Keeps having copd exacerbation issues.     Back with increasing shortness of breath- not in acute distress.     Appearance: Normal appearance. He is not ill-appearing, toxic-appearing or diaphoretic.   HENT:      Head: Normocephalic.   Eyes:      Conjunctiva/sclera: Conjunctivae normal.   Cardiovascular:      Rate and Rhythm: Normal rate.   Pulmonary:      Effort: Pulmonary effort is normal. No respiratory distress.   Neurological:      General: No focal deficit present.      Mental Status: He is alert and oriented to person, place, and time.   Psychiatric:         Mood and Affect: Mood normal.         Behavior: Behavior normal.         Thought Content: Thought content normal.         Judgment: Judgment normal.                 Future Appointments   Date Time Provider Department Center   12/26/2023  2:30 PM ECHO, [facility]   2/29/2024  9:30 AM Nuno White MD [facility]       Medication List with Changes/Refills   New Medications    METRONIDAZOLE (FLAGYL) 500 MG TABLET    Take 1 tablet (500 mg total) by mouth every 12 (twelve) hours. for 7 days   Current Medications    ASCORBIC ACID, VITAMIN C, (VITAMIN C) 1000 MG TABLET    Take 1,000 mg by mouth once daily.    CYANOCOBALAMIN, VITAMIN B-12, (CYANOCOBALAMIN,VIT B-12,,BULK, MISC)    1 tablet by Misc.(Non-Drug; Combo Route) route once daily.    ERGOCALCIFEROL, VITAMIN D2, (VITAMIN D ORAL)    Take 1 tablet by mouth once daily.    HYDROCHLOROTHIAZIDE (HYDRODIURIL) 25 MG TABLET    Take 1 tablet (25 mg total) by mouth once daily.    LOSARTAN (COZAAR) 25 MG TABLET    Take 1 tablet by mouth once daily    METOPROLOL SUCCINATE (TOPROL-XL) 50 MG 24 HR TABLET    Take 1 tablet (50 mg total) by mouth once daily.    OMEGA-3 FATTY ACIDS/FISH OIL (FISH OIL-OMEGA-3 FATTY ACIDS) 300-1,000 MG CAPSULE    Take 1 capsule by mouth once daily.    SERTRALINE (ZOLOFT) 50 MG TABLET    Take 1 tablet (50 mg total) by mouth once daily.         Disclaimer:  This note has been generated using voice-recognition software. There may be  placed on BiPAP. Sp yellow thick - very SOB  No fever -  A. fib RVR. Blood cultures growing Streptococcus pneumonia  WBC 27  CT scan of the chest showing near left lung opacification with associated pleural thickening, seems similar to past CT  Creatinine is slightly elevated, ASHLEIGH    Ox 3        Interval changes  11/2/2021   Patient Vitals for the past 8 hrs:   BP Temp Temp src Pulse Resp SpO2   11/02/21 1137 130/70 97.7 °F (36.5 °C) Oral 83 21 91 %   11/02/21 0832 135/60    20 92 %   11/02/21 0816 (!) 134/57    20 92 %   11/02/21 0746     20 93 %   11/02/21 0741 128/71 98.1 °F (36.7 °C) Oral 73 22 97 %   Had a RUL pneumonia n10/15 full resolved  Now LLL pneumonia w bacteremia - pt concerned why the relapse - ?? Retained a resistance strain or pneumo or is it endocarditits? Await echo    Still nausea- feels better    11/2  ,  Thoracentesis attempted showed 1 cc of blood that was sent, pH 6.5, protein very low, culture pending, WBC 62, PMNs 48  She is on 2 L of nasal cannula,  She has no fever, on Levaquin, echo done pending      Summary of relevant labs:  Labs:  WBC 2027  Micro:  Blood culture positive for strep pneumonia 11/1 asked to  Urine Streptococcus pneumonia negative  Urine Legionella antigen negative    Imaging:  CT chest negative for pulmonary emboli but near opacification of the left lung with left pleural thickening    Reviewing the CT scan, he may be having a left pleural effusion under the consolidation          cxr 11/1  Left worse as below      CXR 10/15 RUL pneumonia           I have personally reviewed the past medical history, past surgical history, medications, social history, and family history, and I haveupdated the database accordingly. Allergies:   Patient has no known allergies. Review of Systems:     Review of Systems   Constitutional: Positive for activity change. HENT: Negative for congestion. Eyes: Negative for discharge.    Respiratory: Positive for grammatical or spelling errors that have been missed during proof-reading      wheezing. Negative for cough and shortness of breath. Cardiovascular: Negative for chest pain. Gastrointestinal: Negative for abdominal distention. Endocrine: Negative for polyuria. Genitourinary: Negative for dysuria. Musculoskeletal: Negative for arthralgias. Skin: Negative for color change. Allergic/Immunologic: Negative for immunocompromised state. Neurological: Negative for dizziness. Hematological: Negative for adenopathy. Psychiatric/Behavioral: Negative for agitation. Physical Examination :       Physical Exam  Constitutional:       Appearance: Normal appearance. She is not ill-appearing. HENT:      Head: Normocephalic and atraumatic. Nose: No congestion. Mouth/Throat:      Mouth: Mucous membranes are moist.   Eyes:      General: No scleral icterus. Conjunctiva/sclera: Conjunctivae normal.   Cardiovascular:      Rate and Rhythm: Normal rate and regular rhythm. Heart sounds: Normal heart sounds. No murmur heard. Pulmonary:      Breath sounds: Wheezing, rhonchi and rales present. Abdominal:      Palpations: There is no mass. Hernia: No hernia is present. Genitourinary:     Comments: No salcedo  Musculoskeletal:         General: No swelling or deformity. Cervical back: Neck supple. No rigidity or tenderness. Skin:     General: Skin is dry. Coloration: Skin is not jaundiced. Neurological:      General: No focal deficit present. Mental Status: She is alert and oriented to person, place, and time. Psychiatric:         Mood and Affect: Mood normal.         Thought Content:  Thought content normal.         Past Medical History:     Past Medical History:   Diagnosis Date    Abnormal CT of the chest 04/21/2021    Adenocarcinoma, lung, left ([facility] 05/14/2021    Arthritis     Community acquired pneumonia 12/23/2019    COPD (chronic obstructive pulmonary disease) ([facility] 08/14/2019    Deep venous thrombosis of left profunda femoris vein ([facility] 04/16/2020    Depression     DVT (deep venous thrombosis) ([facility] 2014    b/l     GERD (gastroesophageal reflux disease)     History of pulmonary embolism 05/27/2021    History of thyroid nodule 12/23/2019    Hypertension     [facility] depressive disorder, recurrent, moderate [facility] 11/12/2018    Nodule of lower lobe of left lung 04/23/2021    Obesity, Class II, BMI 35-39.9 11/12/2018    On anticoagulant therapy 05/27/2021    On methotrexate therapy 05/27/2021    Prediabetes 11/12/2018    Rheumatoid arthritis ([facility]     Snores     denies apnea    Thyroid nodule 01/09/2020    Tobacco abuse 11/12/2018    Under care of team 06/29/2021    pulmonology-Dr Blank- gareth-last visit june 2021    Under care of team 06/29/2021    oncology-Dr Kelly- Kristen-last visit june 2021    Under care of team     Dr. Avendaño Ket clearance appointment 7/6/21, stress test 7/9/2021, clearance obtained and placed on chart    Wellness examination 06/29/2021    pcp-Dr Wing Partida office-last visit may 2021       Past Surgical  History:     Past Surgical History:   Procedure Laterality Date   [facility] N/A 12/27/2019    BRONCHOSCOPY ALVEOLAR LAVAGE performed by Clifton Lane MD at [address] [place] [address] N/A 2/12/2020    COLONOSCOPY POLYPECTOMIES HOT SNARE, COLD BIOPSY POLYPECTOMIES performed [facility] Pipo Borja MD at [facility]  5/13/2021    CT NEEDLE BIOPSY LUNG PERCUTANEOUS 5/13/2021 [facility] CT SCAN    ENDOSCOPY, COLON, DIAGNOSTIC  [id number]    gastritis, sm. bowel lipoma, biopsies    LOBECTOMY Left 07/19/2021    XI ROBOTIC LEFT LOWER LOBECTOMY CONVERTED TO OPEN THORACTOMY LEFT LOWER LOBECTOMY (Left )    LUNG REMOVAL, TOTAL Left 7/19/2021    XI ROBOTIC LEFT LOWER LOBECTOMY CONVERTED TO OPEN THORACTOMY LEFT LOWER LOBECTOMY performed by Cristina Espinoza MD at [facility] OR       Medications:      insulin lispro  0-6 Units SubCUTAneous TID WC    insulin lispro  0-3 Units SubCUTAneous Nightly    methylPREDNISolone  40 mg IntraVENous Q12H    furosemide  20 mg IntraVENous Daily    acetylcysteine  600 mg Inhalation BID    levofloxacin  750 mg IntraVENous Q24H    influenza virus vaccine  0.5 mL IntraMUSCular Prior to discharge    albuterol  2.5 mg Nebulization 4x daily    fluticasone  2 spray Nasal Daily    budesonide-formoterol  2 puff Inhalation BID    pantoprazole  40 mg Oral QAM AC    tiotropium  2 puff Inhalation Daily    sodium chloride flush  10 mL IntraVENous 2 times per day    apixaban  5 mg Oral BID       Social History:     Social History     Socioeconomic History    Marital status: Single     Spouse name: Not on file    Number of children: 6    Years of education: Not on file    Highest education level: Not on file   Occupational History     Employer: N/A   Tobacco Use    Smoking status: Former Smoker     Packs/day: 0.25     Years: 35.00     Pack years: 8.75     Types: Cigarettes     Quit date: 1986     Years since quittin.8    Smokeless tobacco: Never Used    Tobacco comment: restarted smoking 2019   Vaping Use    Vaping Use: Never used   Substance and Sexual Activity    Alcohol use: Not Currently     Alcohol/week: 0.0 standard drinks    Drug use: No    Sexual activity: Not Currently   Other Topics Concern    Not on file   Social History Narrative    Not on file     Social Determinants of Health     Financial Resource Strain: Low Risk     Difficulty of Paying Living Expenses: Not hard at all   Food Insecurity: No Food Insecurity    Worried About Running Out of Food in the Last Year: Never true    Emelyn of Food in the Last Year: Never true   Transportation Needs: No Transportation Needs    Lack of Transportation (Medical): No    Lack of Transportation (Non-Medical):  No   Physical Activity:     Days of Exercise per Week:     Minutes of Exercise per Session:    Stress:     Feeling of Stress :    Social Connections:     Frequency of Communication with Friends and Family:     Frequency of Social Gatherings with Friends and Family:     Attends Orthodoxy Services:     Active Member of Clubs or Organizations:     Attends Club or Organization Meetings:     Marital Status:    Intimate Partner Violence:     Fear of Current or Ex-Partner:     Emotionally Abused:     Physically Abused:     Sexually Abused:        Family History:     Family History   Problem Relation Age of Onset    Cancer Mother         Uterine and breast    Diabetes Mother     Heart Disease Mother     Cancer Father         lung    Cancer Brother         lung    Cancer Brother         lung      Medical Decision Making:   I have independently reviewed/ordered the following labs:    CBC with Differential:   Recent Labs     11/01/21 0614 11/02/21 0618   WBC 20.7* 13.3*   HGB 8.4* 8.9*   HCT 26.8* 28.6*    443   LYMPHOPCT 9* 17*   MONOPCT 4 3     BMP:  Recent Labs     11/01/21 0614 11/02/21 0618   * 133*   K 3.6* 3.8   CL 98 101   CO2 25 22   BUN 42* 34*   CREATININE 1.05* 0.84     Hepatic Function Panel: No results for input(s): PROT, LABALBU, BILIDIR, IBILI, BILITOT, ALKPHOS, ALT, AST in the last 72 hours. No results for input(s): RPR in the last 72 hours. No results for input(s): HIV in the last 72 hours. No results for input(s): BC in the last 72 hours. Lab Results   Component Value Date    CREATININE 0.84 11/02/2021    GLUCOSE 233 11/02/2021    GLUCOSE 105 12/05/2011       Detailed results: Thank you for allowing us to participate in the care of this patient. Please call with questions. This note is created with the assistance of a speech recognition program.  While intending to generate adocument that actually reflects the content of the visit, the document can still have some errors including those of syntax and sound a like substitutions which may escape proof reading.   It such instances, actual meaningcan be extrapolated by contextual diversion.     Pia Rosado MD  Office: [phone]  Perfect serve / office [phone]

## 2023-12-11 LAB
C TRACH DNA SPEC QL NAA+PROBE: NOT DETECTED
N GONORRHOEA DNA SPEC QL NAA+PROBE: NOT DETECTED
RPR SER QL: NORMAL

## 2023-12-26 ENCOUNTER — HOSPITAL ENCOUNTER (OUTPATIENT)
Dept: CARDIOLOGY | Facility: HOSPITAL | Age: 48
Discharge: HOME | End: 2023-12-26
Attending: INTERNAL MEDICINE
Payer: COMMERCIAL

## 2023-12-26 VITALS
DIASTOLIC BLOOD PRESSURE: 70 MMHG | HEART RATE: 70 BPM | HEIGHT: 73 IN | BODY MASS INDEX: 36.71 KG/M2 | SYSTOLIC BLOOD PRESSURE: 128 MMHG | WEIGHT: 277 LBS

## 2023-12-26 DIAGNOSIS — I48.92 ATRIAL FLUTTER, UNSPECIFIED TYPE: ICD-10-CM

## 2023-12-26 LAB
ASCENDING AORTA: 3.62 CM
AV INDEX (PROSTH): 0.95
AV MEAN GRADIENT: 2 MMHG
AV PEAK GRADIENT: 3 MMHG
AV VALVE AREA BY VELOCITY RATIO: 7.39 CM²
AV VALVE AREA: 8.24 CM²
AV VELOCITY RATIO: 0.85
BSA FOR ECHO PROCEDURE: 2.54 M2
CV ECHO LV RWT: 0.42 CM
DOP CALC AO PEAK VEL: 0.89 M/S
DOP CALC AO VTI: 21.17 CM
DOP CALC LVOT AREA: 8.7 CM2
DOP CALC LVOT DIAMETER: 3.32 CM
DOP CALC LVOT PEAK VEL: 0.76 M/S
DOP CALC LVOT STROKE VOLUME: 174.52 CM3
DOP CALCLVOT PEAK VEL VTI: 20.17 CM
E WAVE DECELERATION TIME: 240 MSEC
E/A RATIO: 1.65
E/E' RATIO: 8.67 M/S
ECHO LV POSTERIOR WALL: 1.01 CM (ref 0.6–1.1)
FRACTIONAL SHORTENING: 35 % (ref 28–44)
INTERVENTRICULAR SEPTUM: 0.94 CM (ref 0.6–1.1)
IVRT: 114.18 MSEC
LA MAJOR: 5.27 CM
LA MINOR: 5.68 CM
LA WIDTH: 4.71 CM
LEFT ATRIUM SIZE: 3.97 CM
LEFT ATRIUM VOLUME INDEX MOD: 27.6 ML/M2
LEFT ATRIUM VOLUME INDEX: 35.2 ML/M2
LEFT ATRIUM VOLUME MOD: 68.25 CM3
LEFT ATRIUM VOLUME: 86.9 CM3
LEFT INTERNAL DIMENSION IN SYSTOLE: 3.17 CM (ref 2.1–4)
LEFT VENTRICLE DIASTOLIC VOLUME INDEX: 44.85 ML/M2
LEFT VENTRICLE DIASTOLIC VOLUME: 110.78 ML
LEFT VENTRICLE MASS INDEX: 68 G/M2
LEFT VENTRICLE SYSTOLIC VOLUME INDEX: 16.3 ML/M2
LEFT VENTRICLE SYSTOLIC VOLUME: 40.14 ML
LEFT VENTRICULAR INTERNAL DIMENSION IN DIASTOLE: 4.86 CM (ref 3.5–6)
LEFT VENTRICULAR MASS: 167.86 G
LV LATERAL E/E' RATIO: 7 M/S
LV SEPTAL E/E' RATIO: 11.38 M/S
MV A" WAVE DURATION": 13.7 MSEC
MV PEAK A VEL: 0.55 M/S
MV PEAK E VEL: 0.91 M/S
PISA TR MAX VEL: 2.12 M/S
PULM VEIN S/D RATIO: 1.25
PV PEAK D VEL: 0.51 M/S
PV PEAK S VEL: 0.64 M/S
RA MAJOR: 5.26 CM
RA PRESSURE ESTIMATED: 3 MMHG
RA WIDTH: 4.6 CM
RIGHT VENTRICULAR END-DIASTOLIC DIMENSION: 4.55 CM
RV TB RVSP: 5 MMHG
SINUS: 4.12 CM
STJ: 3.26 CM
TDI LATERAL: 0.13 M/S
TDI SEPTAL: 0.08 M/S
TDI: 0.11 M/S
TR MAX PG: 18 MMHG
TRICUSPID ANNULAR PLANE SYSTOLIC EXCURSION: 1.81 CM
TV REST PULMONARY ARTERY PRESSURE: 21 MMHG
Z-SCORE OF LEFT VENTRICULAR DIMENSION IN END DIASTOLE: -9.76
Z-SCORE OF LEFT VENTRICULAR DIMENSION IN END SYSTOLE: -6.99

## 2023-12-26 PROCEDURE — 93306 ECHO (CUPID ONLY): ICD-10-PCS | Mod: 26,,, | Performed by: INTERNAL MEDICINE

## 2023-12-26 PROCEDURE — 93306 TTE W/DOPPLER COMPLETE: CPT | Mod: 26,,, | Performed by: INTERNAL MEDICINE

## 2023-12-26 PROCEDURE — 93306 TTE W/DOPPLER COMPLETE: CPT

## 2024-01-10 ENCOUNTER — OFFICE VISIT (OUTPATIENT)
Dept: FAMILY MEDICINE | Facility: CLINIC | Age: 49
End: 2024-01-10
Payer: COMMERCIAL

## 2024-01-10 VITALS
WEIGHT: 286.81 LBS | TEMPERATURE: 99 F | HEIGHT: 73 IN | HEART RATE: 66 BPM | DIASTOLIC BLOOD PRESSURE: 82 MMHG | SYSTOLIC BLOOD PRESSURE: 130 MMHG | BODY MASS INDEX: 38.01 KG/M2 | OXYGEN SATURATION: 98 %

## 2024-01-10 DIAGNOSIS — R68.89 FLU-LIKE SYMPTOMS: ICD-10-CM

## 2024-01-10 DIAGNOSIS — J10.1 INFLUENZA A: Primary | ICD-10-CM

## 2024-01-10 LAB
CTP QC/QA: YES
CTP QC/QA: YES
POC MOLECULAR INFLUENZA A AGN: POSITIVE
POC MOLECULAR INFLUENZA B AGN: NEGATIVE
SARS-COV-2 RDRP RESP QL NAA+PROBE: NEGATIVE

## 2024-01-10 PROCEDURE — 3075F SYST BP GE 130 - 139MM HG: CPT | Mod: CPTII,S$GLB,, | Performed by: NURSE PRACTITIONER

## 2024-01-10 PROCEDURE — 99999 PR PBB SHADOW E&M-EST. PATIENT-LVL IV: CPT | Mod: PBBFAC,,, | Performed by: NURSE PRACTITIONER

## 2024-01-10 PROCEDURE — 87502 INFLUENZA DNA AMP PROBE: CPT | Mod: QW,S$GLB,, | Performed by: NURSE PRACTITIONER

## 2024-01-10 PROCEDURE — 3008F BODY MASS INDEX DOCD: CPT | Mod: CPTII,S$GLB,, | Performed by: NURSE PRACTITIONER

## 2024-01-10 PROCEDURE — 3079F DIAST BP 80-89 MM HG: CPT | Mod: CPTII,S$GLB,, | Performed by: NURSE PRACTITIONER

## 2024-01-10 PROCEDURE — 1159F MED LIST DOCD IN RCRD: CPT | Mod: CPTII,S$GLB,, | Performed by: NURSE PRACTITIONER

## 2024-01-10 PROCEDURE — 99213 OFFICE O/P EST LOW 20 MIN: CPT | Mod: S$GLB,,, | Performed by: NURSE PRACTITIONER

## 2024-01-10 PROCEDURE — 87635 SARS-COV-2 COVID-19 AMP PRB: CPT | Mod: QW,S$GLB,, | Performed by: NURSE PRACTITIONER

## 2024-01-10 PROCEDURE — 1160F RVW MEDS BY RX/DR IN RCRD: CPT | Mod: CPTII,S$GLB,, | Performed by: NURSE PRACTITIONER

## 2024-01-10 NOTE — LETTER
January 10, 2024      [facility]  [address]  [place] [address]  Phone: [phone]  Fax: [phone]       Patient: Shane Savage   YOB: 1975  Date of Visit: 01/10/2024    To Whom It May Concern:    Benedict Savage  was at [facility] on 01/10/2024. The patient may return to work with no restrictions once they are fever-free for 24 hours without using any fever-reducing medications. If you have any questions or concerns, or if I can be of further assistance, please do not hesitate to contact me.    Sincerely,          Selin Ovalle NP

## 2024-01-10 NOTE — PROGRESS NOTES
Subjective     Patient ID: Shane Savage is a 48 y.o. male.    Chief Complaint: Generalized Body Aches, Fever, Fatigue, Nasal Congestion, and Diarrhea    This is a pleasant 47 yo male patient of Dr. Coreas who is known to me. He presents today with c/o flu-like symptoms. PMH includes    Patient Active Problem List:     Severe obesity with body mass index (BMI) of 35.0 to 39.9 with comorbidity     Lumbar facet arthropathy     Patellofemoral syndrome     Marijuana user     Decreased range of motion (ROM) of right knee     Decreased strength     Decreased mobility     Essential hypertension     Fatigue     Plantar fasciitis     Chronic lower back pain     Wears contact lenses     Current every day smoker     Episode of moderate major depression    VSS today. Reports he started experiencing symptoms as listed below x past 4 days that has not improved since onset. Returned from [place] this past weekend and was near a boy who appeared to be ill. Patient went to work yesterday and had to leave work early due to symptoms. No c/o CP, SOB, dyspnea. Has tried using DayQuil, NyQuil and Mucinex with mild relief. Continues to smoke cigarettes, about 1 ppd.       Review of Systems   Constitutional:  Positive for chills and fever.   HENT:  Positive for nasal congestion.    Respiratory:  Positive for cough (productive).    Gastrointestinal:  Positive for diarrhea.   Musculoskeletal:  Positive for myalgias.          Objective     Physical Exam  Vitals reviewed.   Constitutional:       General: He is not in acute distress.     Appearance: Normal appearance. He is well-developed and well-groomed.   HENT:      Head: Normocephalic.   Eyes:      General:         Right eye: No discharge.         Left eye: No discharge.   Cardiovascular:      Rate and Rhythm: Normal rate.   Pulmonary:      Effort: Pulmonary effort is normal. No respiratory distress.   Skin:     Coloration: Skin is not pale.   Neurological:      Mental Status: He is alert  and oriented to person, place, and time.      Coordination: Coordination normal.      Gait: Gait normal.   Psychiatric:         Attention and Perception: Attention normal.         Mood and Affect: Mood and affect normal.         Speech: Speech normal.         Behavior: Behavior normal. Behavior is cooperative.         Thought Content: Thought content normal.            Assessment and Plan     1. Influenza A    2. Flu-like symptoms  -     POCT Influenza A/B Molecular  -     POCT COVID-19 Rapid Screening        - Rapid COVID and flu testing done today: COVID negative, FLU A POSITIVE  - Continue with symptomatic relief treatments, Coricidin may be helpful  - Drink plenty fluids and eat as tolerated  - Hot fluids and humidifier may help  - Strict handwashing  - Warning signs discussed  - Letter provided for work  - Quit smoking!  - RTC as needed           Follow up if symptoms worsen or fail to improve.          I spent a total of 20 minutes on the day of the visit.  This includes face to face time and non-face to face time preparing to see the patient (eg, review of tests), obtaining and/or reviewing separately obtained history, documenting clinical information in the electronic or other health record, independently interpreting results and communicating results to the patient/family/caregiver, or care coordinator.

## 2024-02-29 ENCOUNTER — OFFICE VISIT (OUTPATIENT)
Dept: FAMILY MEDICINE | Facility: CLINIC | Age: 49
End: 2024-02-29
Payer: COMMERCIAL

## 2024-02-29 VITALS
BODY MASS INDEX: 38.82 KG/M2 | SYSTOLIC BLOOD PRESSURE: 134 MMHG | HEART RATE: 86 BPM | DIASTOLIC BLOOD PRESSURE: 88 MMHG | OXYGEN SATURATION: 99 % | WEIGHT: 286.63 LBS | HEIGHT: 72 IN

## 2024-02-29 DIAGNOSIS — E66.01 SEVERE OBESITY WITH BODY MASS INDEX (BMI) OF 35.0 TO 39.9 WITH COMORBIDITY: ICD-10-CM

## 2024-02-29 DIAGNOSIS — I10 ESSENTIAL HYPERTENSION: Primary | ICD-10-CM

## 2024-02-29 DIAGNOSIS — B35.3 TINEA PEDIS OF BOTH FEET: ICD-10-CM

## 2024-02-29 DIAGNOSIS — M54.50 CHRONIC BILATERAL LOW BACK PAIN WITHOUT SCIATICA: ICD-10-CM

## 2024-02-29 DIAGNOSIS — G89.29 CHRONIC BILATERAL LOW BACK PAIN WITHOUT SCIATICA: ICD-10-CM

## 2024-02-29 PROBLEM — F32.1 EPISODE OF MODERATE MAJOR DEPRESSION: Status: RESOLVED | Noted: 2023-08-02 | Resolved: 2024-02-29

## 2024-02-29 PROCEDURE — 1160F RVW MEDS BY RX/DR IN RCRD: CPT | Mod: CPTII,S$GLB,, | Performed by: FAMILY MEDICINE

## 2024-02-29 PROCEDURE — 1159F MED LIST DOCD IN RCRD: CPT | Mod: CPTII,S$GLB,, | Performed by: FAMILY MEDICINE

## 2024-02-29 PROCEDURE — 3075F SYST BP GE 130 - 139MM HG: CPT | Mod: CPTII,S$GLB,, | Performed by: FAMILY MEDICINE

## 2024-02-29 PROCEDURE — 99215 OFFICE O/P EST HI 40 MIN: CPT | Mod: S$GLB,,, | Performed by: FAMILY MEDICINE

## 2024-02-29 PROCEDURE — 99999 PR PBB SHADOW E&M-EST. PATIENT-LVL IV: CPT | Mod: PBBFAC,,, | Performed by: FAMILY MEDICINE

## 2024-02-29 PROCEDURE — 3008F BODY MASS INDEX DOCD: CPT | Mod: CPTII,S$GLB,, | Performed by: FAMILY MEDICINE

## 2024-02-29 PROCEDURE — 3079F DIAST BP 80-89 MM HG: CPT | Mod: CPTII,S$GLB,, | Performed by: FAMILY MEDICINE

## 2024-02-29 RX ORDER — CYCLOBENZAPRINE HCL 5 MG
5 TABLET ORAL 2 TIMES DAILY PRN
Qty: 40 TABLET | Refills: 0 | Status: SHIPPED | OUTPATIENT
Start: 2024-02-29 | End: 2024-03-20

## 2024-02-29 RX ORDER — IBUPROFEN 800 MG/1
800 TABLET ORAL 3 TIMES DAILY PRN
Qty: 90 TABLET | Refills: 0 | Status: SHIPPED | OUTPATIENT
Start: 2024-02-29

## 2024-02-29 RX ORDER — ECONAZOLE NITRATE 10 MG/G
CREAM TOPICAL 2 TIMES DAILY
Qty: 85 G | Refills: 0 | Status: SHIPPED | OUTPATIENT
Start: 2024-02-29 | End: 2024-05-07 | Stop reason: SDUPTHER

## 2024-02-29 NOTE — PROGRESS NOTES
Subjective     Patient ID: Shane Savage is a 48 y.o. male.    Chief Complaint: Blood Pressure Check and Back Pain    48 years old male came to the clinic for blood pressure check.  Pressure today was stable.  No chest pain, palpitation orthopnea or PND.  Patient with BMI of 38 currently trying to lose weight.  He is requesting medicine to help the chronic back pain.  The pain is 3/10 of intensity on and off aggravated with activity and better with rest.  Patient chronic tinea pedis.  Patient requesting topical regimen to help with the symptoms.    Back Pain  Pertinent negatives include no chest pain.     Review of Systems   Constitutional: Negative.    HENT: Negative.     Eyes: Negative.    Respiratory: Negative.     Cardiovascular: Negative.  Negative for chest pain, palpitations, leg swelling and claudication.   Gastrointestinal: Negative.    Genitourinary: Negative.    Musculoskeletal:  Positive for back pain.   Integumentary:  Positive for rash.   Neurological: Negative.    Psychiatric/Behavioral: Negative.            Objective     Physical Exam  Vitals and nursing note reviewed.   Constitutional:       General: He is not in acute distress.     Appearance: He is well-developed. He is not diaphoretic.   HENT:      Head: Normocephalic and atraumatic.      Right Ear: External ear normal.      Left Ear: External ear normal.      Nose: Nose normal.      Mouth/Throat:      Pharynx: No oropharyngeal exudate.   Eyes:      General: No scleral icterus.        Right eye: No discharge.         Left eye: No discharge.      Conjunctiva/sclera: Conjunctivae normal.      Pupils: Pupils are equal, round, and reactive to light.   Neck:      Thyroid: No thyromegaly.      Vascular: No JVD.      Trachea: No tracheal deviation.   Cardiovascular:      Rate and Rhythm: Normal rate and regular rhythm.      Heart sounds: Normal heart sounds. No murmur heard.     No friction rub. No gallop.   Pulmonary:      Effort: Pulmonary effort is  normal. No respiratory distress.      Breath sounds: Normal breath sounds. No stridor. No wheezing or rales.   Chest:      Chest wall: No tenderness.   Abdominal:      General: Bowel sounds are normal. There is no distension.      Palpations: Abdomen is soft. There is no mass.      Tenderness: There is no abdominal tenderness. There is no guarding or rebound.   Musculoskeletal:         General: Normal range of motion.      Cervical back: Normal range of motion and neck supple.      Lumbar back: Spasms and tenderness present.   Lymphadenopathy:      Cervical: No cervical adenopathy.   Skin:     General: Skin is warm and dry.      Coloration: Skin is not pale.      Findings: No erythema or rash.   Neurological:      Mental Status: He is alert and oriented to person, place, and time.      Cranial Nerves: No cranial nerve deficit.      Motor: No abnormal muscle tone.      Coordination: Coordination normal.      Deep Tendon Reflexes: Reflexes are normal and symmetric. Reflexes normal.   Psychiatric:         Behavior: Behavior normal.         Thought Content: Thought content normal.         Judgment: Judgment normal.            Assessment and Plan     1. Essential hypertension  -     Urinalysis; Future  -     Comprehensive Metabolic Panel; Future; Expected date: 02/29/2024  -     Lipid Panel; Future; Expected date: 02/29/2024  -     TSH; Future; Expected date: 02/29/2024  -     CBC Auto Differential; Future; Expected date: 02/29/2024  -     Hemoglobin A1C; Future; Expected date: 02/29/2024    2. Severe obesity with body mass index (BMI) of 35.0 to 39.9 with comorbidity  -     Lipid Panel; Future; Expected date: 02/29/2024  -     TSH; Future; Expected date: 02/29/2024    3. Tinea pedis of both feet  -     econazole nitrate 1 % cream; Apply topically 2 (two) times daily.  Dispense: 85 g; Refill: 0    4. Chronic bilateral low back pain without sciatica  -     ibuprofen (ADVIL,MOTRIN) 800 MG tablet; Take 1 tablet (800 mg  total) by mouth 3 (three) times daily as needed for Pain.  Dispense: 90 tablet; Refill: 0  -     cyclobenzaprine (FLEXERIL) 5 MG tablet; Take 1 tablet (5 mg total) by mouth 2 (two) times daily as needed for Muscle spasms.  Dispense: 40 tablet; Refill: 0       Diet and physical activity to promote weight loss.   Continue monitoring blood pressure at home, low sodium diet.            Follow up in about 6 months (around 8/29/2024), or if symptoms worsen or fail to improve.

## 2024-02-29 NOTE — LETTER
February 29, 2024      [facility]  [address]  [place] [address]  Phone: [phone]  Fax: [phone]       Patient: Shane Savage   YOB: 1975  Date of Visit: 02/29/2024    To Whom It May Concern:    Benedict Savage  was at [facility] on 02/29/2024. The patient may return to work/school on 2/29/24 with no restrictions. If you have any questions or concerns, or if I can be of further assistance, please do not hesitate to contact me.    Sincerely,    Korey Woody MA

## 2024-05-07 DIAGNOSIS — B35.3 TINEA PEDIS OF BOTH FEET: ICD-10-CM

## 2024-05-07 NOTE — TELEPHONE ENCOUNTER
Refill Routing Note   Medication(s) are not appropriate for processing by [facility] for the following reason(s):        Outside of protocol    [facility] action(s):  Route               Appointments  past 12m or future 3m with PCP    Date Provider   Last Visit   2/29/2024 Nuno White MD   Next Visit   8/29/2024 Nuno White MD   ED visits in past 90 days: 0        Note composed:4:50 PM 05/07/2024

## 2024-05-08 RX ORDER — ECONAZOLE NITRATE 10 MG/G
CREAM TOPICAL 2 TIMES DAILY
Qty: 85 G | Refills: 0 | Status: SHIPPED | OUTPATIENT
Start: 2024-05-08

## 2024-08-29 ENCOUNTER — OFFICE VISIT (OUTPATIENT)
Dept: FAMILY MEDICINE | Facility: CLINIC | Age: 49
End: 2024-08-29
Payer: COMMERCIAL

## 2024-08-29 VITALS
HEIGHT: 72 IN | BODY MASS INDEX: 38.61 KG/M2 | WEIGHT: 285.06 LBS | OXYGEN SATURATION: 98 % | SYSTOLIC BLOOD PRESSURE: 124 MMHG | HEART RATE: 60 BPM | DIASTOLIC BLOOD PRESSURE: 76 MMHG

## 2024-08-29 DIAGNOSIS — R19.7 DIARRHEA, UNSPECIFIED TYPE: ICD-10-CM

## 2024-08-29 DIAGNOSIS — E66.01 SEVERE OBESITY WITH BODY MASS INDEX (BMI) OF 35.0 TO 39.9 WITH COMORBIDITY: Primary | ICD-10-CM

## 2024-08-29 DIAGNOSIS — M54.32 SCIATICA OF LEFT SIDE: ICD-10-CM

## 2024-08-29 DIAGNOSIS — I10 ESSENTIAL HYPERTENSION: ICD-10-CM

## 2024-08-29 DIAGNOSIS — Z23 NEED FOR SHINGLES VACCINE: ICD-10-CM

## 2024-08-29 PROCEDURE — 99999 PR PBB SHADOW E&M-EST. PATIENT-LVL IV: CPT | Mod: PBBFAC,,, | Performed by: FAMILY MEDICINE

## 2024-08-29 RX ORDER — CELECOXIB 200 MG/1
200 CAPSULE ORAL DAILY
Qty: 30 CAPSULE | Refills: 0 | Status: SHIPPED | OUTPATIENT
Start: 2024-08-29 | End: 2024-09-28

## 2024-08-29 RX ORDER — METHOCARBAMOL 500 MG/1
500 TABLET, FILM COATED ORAL 2 TIMES DAILY PRN
Qty: 40 TABLET | Refills: 0 | Status: SHIPPED | OUTPATIENT
Start: 2024-08-29 | End: 2024-09-18

## 2024-08-29 NOTE — PROGRESS NOTES
Subjective     Patient ID: Shane Savage is a 48 y.o. male.    Chief Complaint: Follow-up and Back Pain    48 years old male came to the clinic for blood pressure check.  Blood pressure today was stable.  Patient with a BMI of 38 currently trying to lose weight.  Patient also with left lower extremity pain associated with sciatica.  Patient is working cutting grass.  Patient due for his shingles shot    Back Pain  Associated symptoms include leg pain. Pertinent negatives include no chest pain, fever, numbness or tingling.   Hypertension  This is a chronic problem. The current episode started more than 1 year ago. The problem is unchanged. The problem is controlled. Pertinent negatives include no chest pain, orthopnea or palpitations. There are no associated agents to hypertension. Risk factors for coronary artery disease include male gender and obesity. Past treatments include beta blockers and diuretics. The current treatment provides significant improvement. There are no compliance problems.  There is no history of angina. There is no history of a hypertension causing med or a thyroid problem.   Diarrhea   This is a recurrent problem. The current episode started more than 1 month ago. The problem occurs less than 2 times per day. The problem has been unchanged. The patient states that diarrhea does not awaken him from sleep. Pertinent negatives include no chills, fever or vomiting. Nothing aggravates the symptoms. There are no known risk factors. He has tried nothing for the symptoms. The treatment provided no relief.   Leg Pain   The incident occurred more than 1 week ago. The incident occurred at home. There was no injury mechanism. The pain is present in the left leg and left thigh. The quality of the pain is described as aching. The pain is at a severity of 5/10. The pain is moderate. The pain has been Intermittent since onset. Pertinent negatives include no numbness or tingling. The symptoms are aggravated  by movement and palpation. He has tried nothing for the symptoms. The treatment provided no relief.     Review of Systems   Constitutional: Negative.  Negative for chills and fever.   HENT: Negative.     Eyes: Negative.    Respiratory: Negative.     Cardiovascular: Negative.  Negative for chest pain, palpitations, orthopnea, leg swelling and claudication.   Gastrointestinal:  Positive for diarrhea. Negative for vomiting.   Genitourinary: Negative.    Musculoskeletal:  Positive for back pain and leg pain.   Integumentary:  Negative.   Neurological: Negative.  Negative for tingling and numbness.   Psychiatric/Behavioral: Negative.            Objective     Physical Exam  Vitals and nursing note reviewed.   Constitutional:       General: He is not in acute distress.     Appearance: He is well-developed. He is not diaphoretic.   HENT:      Head: Normocephalic and atraumatic.      Right Ear: External ear normal.      Left Ear: External ear normal.      Nose: Nose normal.      Mouth/Throat:      Pharynx: No oropharyngeal exudate.   Eyes:      General: No scleral icterus.        Right eye: No discharge.         Left eye: No discharge.      Conjunctiva/sclera: Conjunctivae normal.      Pupils: Pupils are equal, round, and reactive to light.   Neck:      Thyroid: No thyromegaly.      Vascular: No JVD.      Trachea: No tracheal deviation.   Cardiovascular:      Rate and Rhythm: Normal rate and regular rhythm.      Heart sounds: Normal heart sounds. No murmur heard.     No friction rub. No gallop.   Pulmonary:      Effort: Pulmonary effort is normal. No respiratory distress.      Breath sounds: Normal breath sounds. No stridor. No wheezing or rales.   Chest:      Chest wall: No tenderness.   Abdominal:      General: Bowel sounds are normal. There is no distension.      Palpations: Abdomen is soft. There is no mass.      Tenderness: There is no abdominal tenderness. There is no guarding or rebound.   Musculoskeletal:          General: Normal range of motion.      Cervical back: Normal range of motion and neck supple.      Left lower leg: Tenderness present.   Lymphadenopathy:      Cervical: No cervical adenopathy.   Skin:     General: Skin is warm and dry.      Coloration: Skin is not pale.      Findings: No erythema or rash.   Neurological:      Mental Status: He is alert and oriented to person, place, and time.      Cranial Nerves: No cranial nerve deficit.      Motor: No abnormal muscle tone.      Coordination: Coordination normal.      Deep Tendon Reflexes: Reflexes are normal and symmetric. Reflexes normal.   Psychiatric:         Behavior: Behavior normal.         Thought Content: Thought content normal.         Judgment: Judgment normal.            Assessment and Plan     1. Severe obesity with body mass index (BMI) of 35.0 to 39.9 with comorbidity    2. Diarrhea, unspecified type  -     Calprotectin, Stool; Future; Expected date: 08/29/2024  -     Stool culture; Future; Expected date: 08/29/2024  -     Stool Exam-Ova,Cysts,Parasites; Future; Expected date: 08/29/2024  -     WBC, Stool; Future; Expected date: 08/29/2024    3. Essential hypertension    4. Sciatica of left side  -     methocarbamoL (ROBAXIN) 500 MG Tab; Take 1 tablet (500 mg total) by mouth 2 (two) times daily as needed (spasm).  Dispense: 40 tablet; Refill: 0  -     celecoxib (CELEBREX) 200 MG capsule; Take 1 capsule (200 mg total) by mouth once daily.  Dispense: 30 capsule; Refill: 0    5. Need for shingles vaccine  -     varicella zoster (Shingrix) IM vaccine (>/= 51 yo)        Continue monitoring blood pressure at home, low sodium diet.    Diet and physical activity to promote weight loss.   I spent a total of 41 minutes on the day of the visit.This includes face to face time and non-face to face time preparing to see the patient (eg, review of tests), obtaining and/or reviewing separately obtained history, documenting clinical information in the electronic or  other health record, independently interpreting results and communicating results to the patient/family/caregiver, or care coordinator.        Follow up in about 6 months (around 2/28/2025), or if symptoms worsen or fail to improve.

## 2024-09-06 ENCOUNTER — LAB VISIT (OUTPATIENT)
Dept: LAB | Facility: HOSPITAL | Age: 49
End: 2024-09-06
Attending: FAMILY MEDICINE
Payer: COMMERCIAL

## 2024-09-06 DIAGNOSIS — R19.7 DIARRHEA, UNSPECIFIED TYPE: ICD-10-CM

## 2024-09-06 LAB — WBC #/AREA STL HPF: NORMAL /[HPF]

## 2024-09-06 PROCEDURE — 87427 SHIGA-LIKE TOXIN AG IA: CPT | Mod: 59 | Performed by: FAMILY MEDICINE

## 2024-09-06 PROCEDURE — 83993 ASSAY FOR CALPROTECTIN FECAL: CPT | Performed by: FAMILY MEDICINE

## 2024-09-06 PROCEDURE — 87045 FECES CULTURE AEROBIC BACT: CPT | Performed by: FAMILY MEDICINE

## 2024-09-06 PROCEDURE — 87046 STOOL CULTR AEROBIC BACT EA: CPT | Performed by: FAMILY MEDICINE

## 2024-09-06 PROCEDURE — 87449 NOS EACH ORGANISM AG IA: CPT | Performed by: FAMILY MEDICINE

## 2024-09-06 PROCEDURE — 87209 SMEAR COMPLEX STAIN: CPT | Performed by: FAMILY MEDICINE

## 2024-09-06 PROCEDURE — 89055 LEUKOCYTE ASSESSMENT FECAL: CPT | Performed by: FAMILY MEDICINE

## 2024-09-07 LAB
E COLI SXT1 STL QL IA: NEGATIVE
E COLI SXT2 STL QL IA: NEGATIVE

## 2024-09-09 ENCOUNTER — LAB VISIT (OUTPATIENT)
Dept: LAB | Facility: HOSPITAL | Age: 49
End: 2024-09-09
Attending: FAMILY MEDICINE
Payer: COMMERCIAL

## 2024-09-09 DIAGNOSIS — I10 ESSENTIAL HYPERTENSION: ICD-10-CM

## 2024-09-09 DIAGNOSIS — E66.01 SEVERE OBESITY WITH BODY MASS INDEX (BMI) OF 35.0 TO 39.9 WITH COMORBIDITY: ICD-10-CM

## 2024-09-09 LAB
ALBUMIN SERPL BCP-MCNC: 3.5 G/DL (ref 3.5–5.2)
ALP SERPL-CCNC: 46 U/L (ref 55–135)
ALT SERPL W/O P-5'-P-CCNC: 26 U/L (ref 10–44)
ANION GAP SERPL CALC-SCNC: 8 MMOL/L (ref 8–16)
AST SERPL-CCNC: 34 U/L (ref 10–40)
BACTERIA STL CULT: NORMAL
BASOPHILS # BLD AUTO: 0.02 K/UL (ref 0–0.2)
BASOPHILS NFR BLD: 0.4 % (ref 0–1.9)
BILIRUB SERPL-MCNC: 0.8 MG/DL (ref 0.1–1)
BUN SERPL-MCNC: 11 MG/DL (ref 6–20)
CALCIUM SERPL-MCNC: 9.4 MG/DL (ref 8.7–10.5)
CALPROTECTIN STL-MCNT: 7.4 MCG/G
CHLORIDE SERPL-SCNC: 106 MMOL/L (ref 95–110)
CHOLEST SERPL-MCNC: 105 MG/DL (ref 120–199)
CHOLEST/HDLC SERPL: 2.2 {RATIO} (ref 2–5)
CO2 SERPL-SCNC: 24 MMOL/L (ref 23–29)
CREAT SERPL-MCNC: 1 MG/DL (ref 0.5–1.4)
DIFFERENTIAL METHOD BLD: ABNORMAL
EOSINOPHIL # BLD AUTO: 0.1 K/UL (ref 0–0.5)
EOSINOPHIL NFR BLD: 2.1 % (ref 0–8)
ERYTHROCYTE [DISTWIDTH] IN BLOOD BY AUTOMATED COUNT: 13.2 % (ref 11.5–14.5)
EST. GFR  (NO RACE VARIABLE): >60 ML/MIN/1.73 M^2
ESTIMATED AVG GLUCOSE: 108 MG/DL (ref 68–131)
GLUCOSE SERPL-MCNC: 84 MG/DL (ref 70–110)
HBA1C MFR BLD: 5.4 % (ref 4–5.6)
HCT VFR BLD AUTO: 42.8 % (ref 40–54)
HDLC SERPL-MCNC: 48 MG/DL (ref 40–75)
HDLC SERPL: 45.7 % (ref 20–50)
HGB BLD-MCNC: 14 G/DL (ref 14–18)
IMM GRANULOCYTES # BLD AUTO: 0.01 K/UL (ref 0–0.04)
IMM GRANULOCYTES NFR BLD AUTO: 0.2 % (ref 0–0.5)
LDLC SERPL CALC-MCNC: 45.2 MG/DL (ref 63–159)
LYMPHOCYTES # BLD AUTO: 1.7 K/UL (ref 1–4.8)
LYMPHOCYTES NFR BLD: 35.1 % (ref 18–48)
MCH RBC QN AUTO: 32.6 PG (ref 27–31)
MCHC RBC AUTO-ENTMCNC: 32.7 G/DL (ref 32–36)
MCV RBC AUTO: 100 FL (ref 82–98)
MONOCYTES # BLD AUTO: 0.6 K/UL (ref 0.3–1)
MONOCYTES NFR BLD: 12.2 % (ref 4–15)
NEUTROPHILS # BLD AUTO: 2.4 K/UL (ref 1.8–7.7)
NEUTROPHILS NFR BLD: 50 % (ref 38–73)
NONHDLC SERPL-MCNC: 57 MG/DL
NRBC BLD-RTO: 0 /100 WBC
PLATELET # BLD AUTO: 262 K/UL (ref 150–450)
PMV BLD AUTO: 10.7 FL (ref 9.2–12.9)
POTASSIUM SERPL-SCNC: 4 MMOL/L (ref 3.5–5.1)
PROT SERPL-MCNC: 6.7 G/DL (ref 6–8.4)
RBC # BLD AUTO: 4.29 M/UL (ref 4.6–6.2)
SODIUM SERPL-SCNC: 138 MMOL/L (ref 136–145)
TRIGL SERPL-MCNC: 59 MG/DL (ref 30–150)
TSH SERPL DL<=0.005 MIU/L-ACNC: 0.84 UIU/ML (ref 0.4–4)
WBC # BLD AUTO: 4.85 K/UL (ref 3.9–12.7)

## 2024-09-09 PROCEDURE — 83036 HEMOGLOBIN GLYCOSYLATED A1C: CPT | Performed by: FAMILY MEDICINE

## 2024-09-09 PROCEDURE — 80053 COMPREHEN METABOLIC PANEL: CPT | Performed by: FAMILY MEDICINE

## 2024-09-09 PROCEDURE — 85025 COMPLETE CBC W/AUTO DIFF WBC: CPT | Performed by: FAMILY MEDICINE

## 2024-09-09 PROCEDURE — 84443 ASSAY THYROID STIM HORMONE: CPT | Performed by: FAMILY MEDICINE

## 2024-09-09 PROCEDURE — 80061 LIPID PANEL: CPT | Performed by: FAMILY MEDICINE

## 2024-09-11 LAB — O+P STL MICRO: NORMAL

## 2024-09-30 ENCOUNTER — HOSPITAL ENCOUNTER (EMERGENCY)
Facility: HOSPITAL | Age: 49
Discharge: HOME | End: 2024-10-01
Attending: STUDENT IN AN ORGANIZED HEALTH CARE EDUCATION/TRAINING PROGRAM
Payer: COMMERCIAL

## 2024-09-30 DIAGNOSIS — R10.13 EPIGASTRIC PAIN: Primary | ICD-10-CM

## 2024-09-30 LAB
ALBUMIN SERPL BCP-MCNC: 3.8 G/DL (ref 3.5–5.2)
ALP SERPL-CCNC: 41 U/L (ref 55–135)
ALT SERPL W/O P-5'-P-CCNC: 22 U/L (ref 10–44)
ANION GAP SERPL CALC-SCNC: 9 MMOL/L (ref 8–16)
AST SERPL-CCNC: 28 U/L (ref 10–40)
BASOPHILS # BLD AUTO: 0.01 K/UL (ref 0–0.2)
BASOPHILS NFR BLD: 0.2 % (ref 0–1.9)
BILIRUB SERPL-MCNC: 0.6 MG/DL (ref 0.1–1)
BUN SERPL-MCNC: 14 MG/DL (ref 6–20)
CALCIUM SERPL-MCNC: 9.2 MG/DL (ref 8.7–10.5)
CHLORIDE SERPL-SCNC: 102 MMOL/L (ref 95–110)
CO2 SERPL-SCNC: 24 MMOL/L (ref 23–29)
CREAT SERPL-MCNC: 1.2 MG/DL (ref 0.5–1.4)
DIFFERENTIAL METHOD BLD: ABNORMAL
EOSINOPHIL # BLD AUTO: 0.1 K/UL (ref 0–0.5)
EOSINOPHIL NFR BLD: 1.4 % (ref 0–8)
ERYTHROCYTE [DISTWIDTH] IN BLOOD BY AUTOMATED COUNT: 13.3 % (ref 11.5–14.5)
EST. GFR  (NO RACE VARIABLE): >60 ML/MIN/1.73 M^2
GLUCOSE SERPL-MCNC: 101 MG/DL (ref 70–110)
HCT VFR BLD AUTO: 40.4 % (ref 40–54)
HGB BLD-MCNC: 13.6 G/DL (ref 14–18)
IMM GRANULOCYTES # BLD AUTO: 0.02 K/UL (ref 0–0.04)
IMM GRANULOCYTES NFR BLD AUTO: 0.3 % (ref 0–0.5)
LIPASE SERPL-CCNC: 22 U/L (ref 4–60)
LYMPHOCYTES # BLD AUTO: 1.9 K/UL (ref 1–4.8)
LYMPHOCYTES NFR BLD: 32.6 % (ref 18–48)
MCH RBC QN AUTO: 32.8 PG (ref 27–31)
MCHC RBC AUTO-ENTMCNC: 33.7 G/DL (ref 32–36)
MCV RBC AUTO: 97 FL (ref 82–98)
MONOCYTES # BLD AUTO: 0.5 K/UL (ref 0.3–1)
MONOCYTES NFR BLD: 9 % (ref 4–15)
NEUTROPHILS # BLD AUTO: 3.3 K/UL (ref 1.8–7.7)
NEUTROPHILS NFR BLD: 56.5 % (ref 38–73)
NRBC BLD-RTO: 0 /100 WBC
PLATELET # BLD AUTO: 245 K/UL (ref 150–450)
PMV BLD AUTO: 9.8 FL (ref 9.2–12.9)
POTASSIUM SERPL-SCNC: 3.4 MMOL/L (ref 3.5–5.1)
PROT SERPL-MCNC: 7 G/DL (ref 6–8.4)
RBC # BLD AUTO: 4.15 M/UL (ref 4.6–6.2)
SODIUM SERPL-SCNC: 135 MMOL/L (ref 136–145)
WBC # BLD AUTO: 5.77 K/UL (ref 3.9–12.7)

## 2024-09-30 PROCEDURE — 80053 COMPREHEN METABOLIC PANEL: CPT

## 2024-09-30 PROCEDURE — 99283 EMERGENCY DEPT VISIT LOW MDM: CPT

## 2024-09-30 PROCEDURE — 83690 ASSAY OF LIPASE: CPT

## 2024-09-30 PROCEDURE — 81003 URINALYSIS AUTO W/O SCOPE: CPT | Performed by: STUDENT IN AN ORGANIZED HEALTH CARE EDUCATION/TRAINING PROGRAM

## 2024-09-30 PROCEDURE — 85025 COMPLETE CBC W/AUTO DIFF WBC: CPT

## 2024-09-30 RX ORDER — ONDANSETRON HYDROCHLORIDE 2 MG/ML
4 INJECTION, SOLUTION INTRAVENOUS
Status: DISCONTINUED | OUTPATIENT
Start: 2024-09-30 | End: 2024-10-01 | Stop reason: HOSPADM

## 2024-09-30 NOTE — Clinical Note
"Shane Becerrilsadie Savage was seen and treated in our emergency department on 9/30/2024.  He may return to work on 10/02/2024.       If you have any questions or concerns, please don't hesitate to call.      Ryland Desai MD"

## 2024-10-01 VITALS
HEART RATE: 52 BPM | SYSTOLIC BLOOD PRESSURE: 150 MMHG | OXYGEN SATURATION: 98 % | DIASTOLIC BLOOD PRESSURE: 71 MMHG | RESPIRATION RATE: 18 BRPM | BODY MASS INDEX: 38.65 KG/M2 | WEIGHT: 285 LBS | TEMPERATURE: 98 F

## 2024-10-01 LAB
BILIRUB UR QL STRIP: NEGATIVE
CLARITY UR: CLEAR
COLOR UR: YELLOW
GLUCOSE UR QL STRIP: NEGATIVE
HGB UR QL STRIP: ABNORMAL
KETONES UR QL STRIP: NEGATIVE
LEUKOCYTE ESTERASE UR QL STRIP: NEGATIVE
NITRITE UR QL STRIP: NEGATIVE
PH UR STRIP: 6 [PH] (ref 5–8)
PROT UR QL STRIP: NEGATIVE
SP GR UR STRIP: 1.01 (ref 1–1.03)
URN SPEC COLLECT METH UR: ABNORMAL
UROBILINOGEN UR STRIP-ACNC: NEGATIVE EU/DL

## 2024-10-01 PROCEDURE — 25000003 PHARM REV CODE 250: Performed by: STUDENT IN AN ORGANIZED HEALTH CARE EDUCATION/TRAINING PROGRAM

## 2024-10-01 RX ORDER — LIDOCAINE HYDROCHLORIDE 20 MG/ML
15 SOLUTION OROPHARYNGEAL ONCE
Status: COMPLETED | OUTPATIENT
Start: 2024-10-01 | End: 2024-10-01

## 2024-10-01 RX ORDER — ALUMINUM HYDROXIDE, MAGNESIUM HYDROXIDE, AND SIMETHICONE 1200; 120; 1200 MG/30ML; MG/30ML; MG/30ML
30 SUSPENSION ORAL ONCE
Status: COMPLETED | OUTPATIENT
Start: 2024-10-01 | End: 2024-10-01

## 2024-10-01 RX ORDER — OMEPRAZOLE 40 MG/1
40 CAPSULE, DELAYED RELEASE ORAL DAILY
Qty: 90 CAPSULE | Refills: 3 | Status: SHIPPED | OUTPATIENT
Start: 2024-10-01 | End: 2024-10-04

## 2024-10-01 RX ADMIN — LIDOCAINE HYDROCHLORIDE 15 ML: 20 SOLUTION ORAL at 12:10

## 2024-10-01 RX ADMIN — ALUMINUM HYDROXIDE, MAGNESIUM HYDROXIDE, AND SIMETHICONE 30 ML: 200; 200; 20 SUSPENSION ORAL at 12:10

## 2024-10-01 NOTE — ED TRIAGE NOTES
Patient presents with intermittent epigastric pain accompanied by N/V/D for several months. She previously visited her PCP, where unk test results were negative per patient, and he was not referred for further evaluation. He was advised to take Imodium, which resolved his diarrhea. However, the patient reports increasing abdominal pain, prompting visit to the ED today. He took two 10 mg hydrocodone tablets (from her mother), which improved pain prior to arrival, approximately at 18:00 this evening.

## 2024-10-01 NOTE — ED PROVIDER NOTES
ED Provider Note - 2024    History     Chief Complaint   Patient presents with    Abdominal Pain     Pt Shane Savage presents to ER c/o abdominal pains x months, states has not improved. Did see Primary, did a stool sample, but results were negative. States he has been having Nausea, Diarrhea, and abdominal pains, has been getting worse. .  Equal Chest Rise and fall, respirations even and unlabored, Patient appears to be in NAD, GCS 15 VSS.          HPI     Shane Savage is a 48 y.o. year old male with past medical and surgical history as seen below, presenting with chief complaint of epigastric pain has been intermittent for months.  States history of H pylori x2.  Not currently on PPI or acid controlling agents.        Past Medical History:   Diagnosis Date    Arthritis     Hypertension      Past Surgical History:   Procedure Laterality Date    gastic bypass           Family History   Problem Relation Name Age of Onset    Hypertension Mother      Diabetes Mother      Hypertension Father       Social History     Tobacco Use    Smoking status: Some Days     Current packs/day: 0.00     Average packs/day: 0.1 packs/day for 9.0 years (0.9 ttl pk-yrs)     Types: Cigarettes     Start date: 2012     Last attempt to quit: 2021     Years since quittin.8    Smokeless tobacco: Former    Tobacco comments:     Stopped smoking 3 weeks ago (today 1/3/22), using nicotine vape throughout the day   Substance Use Topics    Alcohol use: Yes    Drug use: Yes     Types: Marijuana     Social Drivers of Health with Concerns     Tobacco Use: High Risk (2024)    Patient History     Smoking Tobacco Use: Some Days     Smokeless Tobacco Use: Former     Passive Exposure: Not on file   Alcohol Use: Not on file   Financial Resource Strain: Not on file   Food Insecurity: Not on file   Transportation Needs: Not on file   Physical Activity: Not on file   Stress: Not on file   Housing Stability: Not on file   Utilities: Not  on file   Health Literacy: Not on file   Social Isolation: Not on file      Review of patient's allergies indicates:   Allergen Reactions    Iodine and iodide containing products      States is no longer an allergy       Review of Systems     A full Review of Systems (ROS) was performed and was negative unless otherwise stated in the HPI.      Physical Exam     Vitals:    09/30/24 1928 09/30/24 2249 09/30/24 2250 10/01/24 0025   BP: 137/77 (!) 147/81  (!) 150/71   Pulse: (!) 56 (!) 47  (!) 52   Resp: 16 20  18   Temp: 98.4 °F (36.9 °C)      TempSrc: Oral      SpO2: 100% 97%  98%   Weight:   129.3 kg (285 lb)         Physical Exam    Nursing note and vitals reviewed.  Constitutional: He appears well-developed and well-nourished. No distress.   HENT:   Head: Normocephalic and atraumatic.   Right Ear: External ear normal.   Left Ear: External ear normal.   Nose: Nose normal. Mouth/Throat: Oropharynx is clear and moist.   Eyes: Conjunctivae and EOM are normal. Pupils are equal, round, and reactive to light.   Neck: Neck supple.   Normal range of motion.  Cardiovascular:  Normal rate and regular rhythm.           No murmur heard.  Pulmonary/Chest: Breath sounds normal. No stridor. No respiratory distress. He has no wheezes. He has no rhonchi. He has no rales.   Abdominal: Abdomen is soft. Bowel sounds are normal. There is no abdominal tenderness.   Musculoskeletal:         General: No edema. Normal range of motion.      Cervical back: Normal range of motion and neck supple.     Neurological: He is alert and oriented to person, place, and time. He has normal strength. No cranial nerve deficit or sensory deficit.   Skin: Skin is warm and dry. No rash noted.   Psychiatric: He has a normal mood and affect. Thought content normal.         Lab Results- Independently reviewed by myself      Labs Reviewed   CBC W/ AUTO DIFFERENTIAL - Abnormal       Result Value    WBC 5.77      RBC 4.15 (*)     Hemoglobin 13.6 (*)     Hematocrit  40.4      MCV 97      MCH 32.8 (*)     MCHC 33.7      RDW 13.3      Platelets 245      MPV 9.8      Immature Granulocytes 0.3      Gran # (ANC) 3.3      Immature Grans (Abs) 0.02      Lymph # 1.9      Mono # 0.5      Eos # 0.1      Baso # 0.01      nRBC 0      Gran % 56.5      Lymph % 32.6      Mono % 9.0      Eosinophil % 1.4      Basophil % 0.2      Differential Method Automated     COMPREHENSIVE METABOLIC PANEL - Abnormal    Sodium 135 (*)     Potassium 3.4 (*)     Chloride 102      CO2 24      Glucose 101      BUN 14      Creatinine 1.2      Calcium 9.2      Total Protein 7.0      Albumin 3.8      Total Bilirubin 0.6      Alkaline Phosphatase 41 (*)     AST 28      ALT 22      eGFR >60      Anion Gap 9     URINALYSIS, REFLEX TO URINE CULTURE - Abnormal    Specimen UA Urine, Clean Catch      Color, UA Yellow      Appearance, UA Clear      pH, UA 6.0      Specific Gravity, UA 1.010      Protein, UA Negative      Glucose, UA Negative      Ketones, UA Negative      Bilirubin (UA) Negative      Occult Blood UA Trace (*)     Nitrite, UA Negative      Urobilinogen, UA Negative      Leukocytes, UA Negative      Narrative:     Specimen Source->Urine   LIPASE    Lipase 22             Imaging     Imaging Results    None                    ED Course         Procedures         Orders Placed This Encounter    CBC W/ AUTO DIFFERENTIAL    Comp. Metabolic Panel    Lipase    Urinalysis, Reflex to Urine Culture Urine, Clean Catch    Insert peripheral IV    omeprazole (PRILOSEC) 40 MG capsule    AND Linked Order Group     aluminum-magnesium hydroxide-simethicone 200-200-20 mg/5 mL suspension 30 mL     LIDOcaine viscous HCl 2% oral solution 15 mL          ED Course as of 10/01/24 0425   Tue Oct 01, 2024   0005 CBC W/ AUTO DIFFERENTIAL(!)  CBC: No significant anemia, platelet disorder, or leukocytosis.   [KB]   0005 Comp. Metabolic Panel(!)  CMP: Normal electrolytes, renal function, liver enzymes   [KB]   0005 Urinalysis, Reflex to  Urine Culture Urine, Clean Catch(!)  UA negative for leukocytes and nitrites making urinary infection highly unlikely.   [KB]   0006 Lipase  normal [KB]      ED Course User Index  [KB] Ryland Desai MD              Medical Decision Making       The patient's list of active medical problems, social history, medications, and allergies as documented per RN staff has been reviewed.     Comorbidities taken into consideration for development of diagnosis and treatment plan include HTN.      Medical Decision Making  Presentation consistent with acute epigastric abdominal pain likely secondary to GERD/gastritis/PUD. Abdominal exam without peritoneal signs. No evidence of acute abdomen at this time. Well appearing. Given work up have low suspicion for acute hepatobiliary disease (including acute cholecystitis or cholangitis), upper GI bleed, gastric perforation, acute infectious processes (pneumonia, hepatitis, pyelonephritis), atypical appendicitis, vascular catastrophe, bowel obstruction or viscus perforation, or acute coronary syndrome. Presentation not consistent with other acute, emergent causes of abdominal pain at this time.  Given chronic and ongoing nature of symptoms, did not feel CT or x-ray of the abdomen would  at this time.  Patient tolerating PO prior to discharge. Close follow-up with PCP. Return to the ED for reevaluation should symptoms worsen, return, or change in character.      Amount and/or Complexity of Data Reviewed  External Data Reviewed: notes.  Labs: ordered. Decision-making details documented in ED Course.    Risk  OTC drugs.  Prescription drug management.                    ED Prescriptions       Medication Sig Dispense Start Date End Date Auth. Provider    omeprazole (PRILOSEC) 40 MG capsule Take 1 capsule (40 mg total) by mouth once daily. 90 capsule 10/1/2024 10/1/2025 Ryland Desai MD              Clinical Impression       Follow-up Information       Follow up With  Specialties Details Why Contact Info    Nuno White MD Family Medicine Schedule an appointment as soon as possible for a visit in 5 days For follow-up on today's visit. [address]  [place] [id number] [phone]      [facility] Dept Emergency Medicine Go to  As needed, If symptoms worsen [address]  [facility] [address] [phone]            Referrals:  No orders of the defined types were placed in this encounter.      Disposition   ED Disposition Condition    Discharge Stable              Final diagnoses:  [R10.13] Epigastric pain (Primary)        Ryland Desai MD        10/01/2024          DISCLAIMER: This note was prepared with [employer] voice recognition transcription software. Garbled syntax, mangled pronouns, and other bizarre constructions may be attributed to that software system.       Ryland Desai MD  10/01/24 0590

## 2024-10-01 NOTE — DISCHARGE INSTRUCTIONS
Avoid fried, fatty, or spicy foods.    Avoid large volume meals.    Stay upright after eating for at least 60 minutes.    You may try over-the-counter Maalox, Mylanta, or similar generics if symptoms began again.    Follow up with your primary care provider as there is testing in the outpatient setting that can not be performed in the emergency department for these type of symptoms.

## 2024-10-02 ENCOUNTER — TELEPHONE (OUTPATIENT)
Dept: INTERNAL MEDICINE | Facility: CLINIC | Age: 49
End: 2024-10-02
Payer: COMMERCIAL

## 2024-10-02 NOTE — TELEPHONE ENCOUNTER
Patient states he continues with epigastric pain.Pain lasting all day. No better since seen in ED. FU appointment scheduled. Verbalized understanding.

## 2024-10-04 ENCOUNTER — LAB VISIT (OUTPATIENT)
Dept: LAB | Facility: HOSPITAL | Age: 49
End: 2024-10-04
Attending: FAMILY MEDICINE
Payer: COMMERCIAL

## 2024-10-04 ENCOUNTER — OFFICE VISIT (OUTPATIENT)
Dept: FAMILY MEDICINE | Facility: CLINIC | Age: 49
End: 2024-10-04
Payer: COMMERCIAL

## 2024-10-04 VITALS
SYSTOLIC BLOOD PRESSURE: 110 MMHG | DIASTOLIC BLOOD PRESSURE: 60 MMHG | WEIGHT: 281 LBS | HEART RATE: 60 BPM | OXYGEN SATURATION: 98 % | BODY MASS INDEX: 38.11 KG/M2

## 2024-10-04 DIAGNOSIS — R11.2 NAUSEA AND VOMITING, UNSPECIFIED VOMITING TYPE: ICD-10-CM

## 2024-10-04 DIAGNOSIS — R19.7 DIARRHEA, UNSPECIFIED TYPE: ICD-10-CM

## 2024-10-04 DIAGNOSIS — R10.10 PAIN OF UPPER ABDOMEN: Primary | ICD-10-CM

## 2024-10-04 DIAGNOSIS — I10 ESSENTIAL HYPERTENSION: ICD-10-CM

## 2024-10-04 DIAGNOSIS — R10.10 PAIN OF UPPER ABDOMEN: ICD-10-CM

## 2024-10-04 LAB
ALBUMIN SERPL BCP-MCNC: 3.4 G/DL (ref 3.5–5.2)
ALP SERPL-CCNC: 37 U/L (ref 55–135)
ALT SERPL W/O P-5'-P-CCNC: 19 U/L (ref 10–44)
AMYLASE SERPL-CCNC: 69 U/L (ref 20–110)
ANION GAP SERPL CALC-SCNC: 1 MMOL/L (ref 8–16)
AST SERPL-CCNC: 22 U/L (ref 10–40)
BASOPHILS # BLD AUTO: 0.01 K/UL (ref 0–0.2)
BASOPHILS NFR BLD: 0.3 % (ref 0–1.9)
BILIRUB SERPL-MCNC: 0.5 MG/DL (ref 0.1–1)
BUN SERPL-MCNC: 8 MG/DL (ref 6–20)
CALCIUM SERPL-MCNC: 9.4 MG/DL (ref 8.7–10.5)
CHLORIDE SERPL-SCNC: 110 MMOL/L (ref 95–110)
CO2 SERPL-SCNC: 30 MMOL/L (ref 23–29)
CREAT SERPL-MCNC: 1 MG/DL (ref 0.5–1.4)
DIFFERENTIAL METHOD BLD: ABNORMAL
EOSINOPHIL # BLD AUTO: 0.1 K/UL (ref 0–0.5)
EOSINOPHIL NFR BLD: 2.4 % (ref 0–8)
ERYTHROCYTE [DISTWIDTH] IN BLOOD BY AUTOMATED COUNT: 13.4 % (ref 11.5–14.5)
EST. GFR  (NO RACE VARIABLE): >60 ML/MIN/1.73 M^2
GLUCOSE SERPL-MCNC: 66 MG/DL (ref 70–110)
HCT VFR BLD AUTO: 40.6 % (ref 40–54)
HGB BLD-MCNC: 13.5 G/DL (ref 14–18)
IMM GRANULOCYTES # BLD AUTO: 0.01 K/UL (ref 0–0.04)
IMM GRANULOCYTES NFR BLD AUTO: 0.3 % (ref 0–0.5)
LIPASE SERPL-CCNC: 15 U/L (ref 4–60)
LYMPHOCYTES # BLD AUTO: 1.4 K/UL (ref 1–4.8)
LYMPHOCYTES NFR BLD: 36.3 % (ref 18–48)
MCH RBC QN AUTO: 33.5 PG (ref 27–31)
MCHC RBC AUTO-ENTMCNC: 33.3 G/DL (ref 32–36)
MCV RBC AUTO: 101 FL (ref 82–98)
MONOCYTES # BLD AUTO: 0.3 K/UL (ref 0.3–1)
MONOCYTES NFR BLD: 7.7 % (ref 4–15)
NEUTROPHILS # BLD AUTO: 2 K/UL (ref 1.8–7.7)
NEUTROPHILS NFR BLD: 53 % (ref 38–73)
NRBC BLD-RTO: 0 /100 WBC
PLATELET # BLD AUTO: 245 K/UL (ref 150–450)
PMV BLD AUTO: 10.4 FL (ref 9.2–12.9)
POTASSIUM SERPL-SCNC: 4.1 MMOL/L (ref 3.5–5.1)
PROT SERPL-MCNC: 6.3 G/DL (ref 6–8.4)
RBC # BLD AUTO: 4.03 M/UL (ref 4.6–6.2)
SODIUM SERPL-SCNC: 141 MMOL/L (ref 136–145)
WBC # BLD AUTO: 3.77 K/UL (ref 3.9–12.7)

## 2024-10-04 PROCEDURE — 80053 COMPREHEN METABOLIC PANEL: CPT | Performed by: FAMILY MEDICINE

## 2024-10-04 PROCEDURE — 82150 ASSAY OF AMYLASE: CPT | Performed by: FAMILY MEDICINE

## 2024-10-04 PROCEDURE — 36415 COLL VENOUS BLD VENIPUNCTURE: CPT | Mod: PO | Performed by: FAMILY MEDICINE

## 2024-10-04 PROCEDURE — 99999 PR PBB SHADOW E&M-EST. PATIENT-LVL III: CPT | Mod: PBBFAC,,, | Performed by: FAMILY MEDICINE

## 2024-10-04 PROCEDURE — 83690 ASSAY OF LIPASE: CPT | Performed by: FAMILY MEDICINE

## 2024-10-04 PROCEDURE — 85025 COMPLETE CBC W/AUTO DIFF WBC: CPT | Performed by: FAMILY MEDICINE

## 2024-10-04 RX ORDER — PREDNISONE 50 MG/1
TABLET ORAL
Qty: 3 TABLET | Refills: 0 | Status: SHIPPED | OUTPATIENT
Start: 2024-10-04

## 2024-10-04 RX ORDER — DIPHENHYDRAMINE HCL 50 MG
CAPSULE ORAL
Qty: 1 CAPSULE | Refills: 0 | Status: SHIPPED | OUTPATIENT
Start: 2024-10-04

## 2024-10-04 NOTE — PROGRESS NOTES
Subjective     Patient ID: Shane Savage is a 48 y.o. male.    Chief Complaint: Abdominal Pain     48 years old male with chronic abdominal pain for the last 3 months.  Pain over the upper abdomen.  No epigastric area.  No recent colonoscopy.  Stool testing was negative.  Patient also reports episodic nausea and vomiting daily associated with diarrhea 3 times per week.  No fever or chills.    Abdominal Pain  This is a chronic problem. The current episode started more than 1 month ago. The problem occurs daily. The problem has been unchanged. The pain is located in the generalized abdominal region. The pain is at a severity of 10/10. The pain is severe. The quality of the pain is aching. The abdominal pain does not radiate. Associated symptoms include diarrhea, nausea and vomiting. Pertinent negatives include no fever. The pain is aggravated by palpation. The pain is relieved by Certain positions. The treatment provided mild relief.   Diarrhea   This is a chronic problem. The current episode started more than 1 month ago. The problem has been gradually improving. The stool consistency is described as Watery. The patient states that diarrhea does not awaken him from sleep. Associated symptoms include abdominal pain and vomiting. Pertinent negatives include no fever. Nothing aggravates the symptoms. He has tried nothing for the symptoms. The treatment provided moderate relief.   Nausea  This is a chronic problem. The current episode started more than 1 month ago. The problem occurs daily. The problem has been unchanged. Associated symptoms include abdominal pain, nausea and vomiting. Pertinent negatives include no chest pain or fever. Nothing aggravates the symptoms. He has tried nothing for the symptoms. The treatment provided mild relief.   Hypertension  This is a chronic problem. The current episode started more than 1 year ago. The problem is unchanged. The problem is controlled. Pertinent negatives include no  chest pain, orthopnea, palpitations or peripheral edema. Risk factors for coronary artery disease include male gender and obesity. Past treatments include beta blockers, angiotensin blockers and diuretics. The current treatment provides significant improvement. There are no compliance problems.  There is no history of angina. There is no history of a hypertension causing med or a thyroid problem.     Review of Systems   Constitutional: Negative.  Negative for fever.   HENT: Negative.     Eyes: Negative.    Respiratory: Negative.     Cardiovascular: Negative.  Negative for chest pain, palpitations and orthopnea.   Gastrointestinal:  Positive for abdominal pain, diarrhea, nausea and vomiting.   Genitourinary: Negative.    Musculoskeletal: Negative.    Integumentary:  Negative.   Neurological: Negative.    Psychiatric/Behavioral: Negative.            Objective     Physical Exam  Vitals and nursing note reviewed.   Constitutional:       General: He is not in acute distress.     Appearance: He is well-developed. He is not diaphoretic.   HENT:      Head: Normocephalic and atraumatic.      Right Ear: External ear normal.      Left Ear: External ear normal.      Nose: Nose normal.      Mouth/Throat:      Pharynx: No oropharyngeal exudate.   Eyes:      General: No scleral icterus.        Right eye: No discharge.         Left eye: No discharge.      Conjunctiva/sclera: Conjunctivae normal.      Pupils: Pupils are equal, round, and reactive to light.   Neck:      Thyroid: No thyromegaly.      Vascular: No JVD.      Trachea: No tracheal deviation.   Cardiovascular:      Rate and Rhythm: Normal rate and regular rhythm.      Heart sounds: Normal heart sounds. No murmur heard.     No friction rub. No gallop.   Pulmonary:      Effort: Pulmonary effort is normal. No respiratory distress.      Breath sounds: Normal breath sounds. No stridor. No wheezing or rales.   Chest:      Chest wall: No tenderness.   Abdominal:      General:  Bowel sounds are normal. There is no distension.      Palpations: Abdomen is soft. There is no mass.      Tenderness: There is no abdominal tenderness. There is no guarding or rebound.   Musculoskeletal:         General: No tenderness. Normal range of motion.      Cervical back: Normal range of motion and neck supple.   Lymphadenopathy:      Cervical: No cervical adenopathy.   Skin:     General: Skin is warm and dry.      Coloration: Skin is not pale.      Findings: No erythema or rash.   Neurological:      Mental Status: He is alert and oriented to person, place, and time.      Cranial Nerves: No cranial nerve deficit.      Motor: No abnormal muscle tone.      Coordination: Coordination normal.      Deep Tendon Reflexes: Reflexes are normal and symmetric. Reflexes normal.   Psychiatric:         Behavior: Behavior normal.         Thought Content: Thought content normal.         Judgment: Judgment normal.            Assessment and Plan     1. Pain of upper abdomen  -     CBC Auto Differential; Future; Expected date: 10/04/2024  -     Amylase; Future; Expected date: 10/04/2024  -     Lipase; Future; Expected date: 10/04/2024  -     Comprehensive Metabolic Panel; Future; Expected date: 10/04/2024  -     CT Abdomen Pelvis W Wo Contrast; Future; Expected date: 10/04/2024  -     predniSONE (DELTASONE) 50 MG Tab; Take 50mg by mouth at 13 hours, 7 hours, and 1 hour before contrast administration.  Dispense: 3 tablet; Refill: 0  -     diphenhydrAMINE (BENADRYL) 50 MG capsule; Take 50mg by mouth 1 hour before contrast administration.  Dispense: 1 capsule; Refill: 0    2. Nausea and vomiting, unspecified vomiting type  -     CBC Auto Differential; Future; Expected date: 10/04/2024  -     Amylase; Future; Expected date: 10/04/2024  -     Lipase; Future; Expected date: 10/04/2024  -     Comprehensive Metabolic Panel; Future; Expected date: 10/04/2024    3. Diarrhea, unspecified type  -     CBC Auto Differential; Future; Expected  date: 10/04/2024  -     Comprehensive Metabolic Panel; Future; Expected date: 10/04/2024    4. Essential hypertension  -     CBC Auto Differential; Future; Expected date: 10/04/2024  -     Comprehensive Metabolic Panel; Future; Expected date: 10/04/2024    I spent a total of 40 minutes on the day of the visit.This includes face to face time and non-face to face time preparing to see the patient (eg, review of tests), obtaining and/or reviewing separately obtained history, documenting clinical information in the electronic or other health record, independently interpreting results and communicating results to the patient/family/caregiver, or care coordinator.     Continue monitoring blood pressure at home, low sodium diet.          Follow up if symptoms worsen or fail to improve.

## 2024-10-10 ENCOUNTER — HOSPITAL ENCOUNTER (OUTPATIENT)
Dept: RADIOLOGY | Facility: HOSPITAL | Age: 49
Discharge: HOME | End: 2024-10-10
Attending: FAMILY MEDICINE
Payer: COMMERCIAL

## 2024-10-10 DIAGNOSIS — R10.10 PAIN OF UPPER ABDOMEN: ICD-10-CM

## 2024-10-10 PROCEDURE — 74178 CT ABD&PLV WO CNTR FLWD CNTR: CPT | Mod: TC

## 2024-10-10 PROCEDURE — 74178 CT ABD&PLV WO CNTR FLWD CNTR: CPT | Mod: 26,,, | Performed by: RADIOLOGY

## 2024-10-10 PROCEDURE — 25500020 PHARM REV CODE 255: Performed by: FAMILY MEDICINE

## 2024-10-10 PROCEDURE — A9698 NON-RAD CONTRAST MATERIALNOC: HCPCS | Performed by: FAMILY MEDICINE

## 2024-10-10 RX ADMIN — IOHEXOL 1000 ML: 12 SOLUTION ORAL at 09:10

## 2024-10-10 RX ADMIN — IOHEXOL 100 ML: 350 INJECTION, SOLUTION INTRAVENOUS at 10:10

## 2024-10-11 ENCOUNTER — HOSPITAL ENCOUNTER (EMERGENCY)
Facility: HOSPITAL | Age: 49
Discharge: LEFT BEFORE DISCHARGE | End: 2024-10-11
Payer: COMMERCIAL

## 2024-10-11 VITALS
DIASTOLIC BLOOD PRESSURE: 74 MMHG | HEIGHT: 73 IN | HEART RATE: 96 BPM | RESPIRATION RATE: 17 BRPM | SYSTOLIC BLOOD PRESSURE: 151 MMHG | OXYGEN SATURATION: 98 % | WEIGHT: 280 LBS | BODY MASS INDEX: 37.11 KG/M2 | TEMPERATURE: 98 F

## 2024-10-11 DIAGNOSIS — R00.2 PALPITATIONS: ICD-10-CM

## 2024-10-11 LAB
BASOPHILS # BLD AUTO: 0.01 K/UL (ref 0–0.2)
BASOPHILS NFR BLD: 0.1 % (ref 0–1.9)
BNP SERPL-MCNC: 51 PG/ML (ref 0–99)
DIFFERENTIAL METHOD BLD: ABNORMAL
EOSINOPHIL # BLD AUTO: 0 K/UL (ref 0–0.5)
EOSINOPHIL NFR BLD: 0.4 % (ref 0–8)
ERYTHROCYTE [DISTWIDTH] IN BLOOD BY AUTOMATED COUNT: 13.4 % (ref 11.5–14.5)
HCT VFR BLD AUTO: 45.9 % (ref 40–54)
HGB BLD-MCNC: 15.4 G/DL (ref 14–18)
IMM GRANULOCYTES # BLD AUTO: 0.03 K/UL (ref 0–0.04)
IMM GRANULOCYTES NFR BLD AUTO: 0.4 % (ref 0–0.5)
LYMPHOCYTES # BLD AUTO: 2.7 K/UL (ref 1–4.8)
LYMPHOCYTES NFR BLD: 32.2 % (ref 18–48)
MCH RBC QN AUTO: 32.4 PG (ref 27–31)
MCHC RBC AUTO-ENTMCNC: 33.6 G/DL (ref 32–36)
MCV RBC AUTO: 97 FL (ref 82–98)
MONOCYTES # BLD AUTO: 0.6 K/UL (ref 0.3–1)
MONOCYTES NFR BLD: 7.3 % (ref 4–15)
NEUTROPHILS # BLD AUTO: 4.9 K/UL (ref 1.8–7.7)
NEUTROPHILS NFR BLD: 59.6 % (ref 38–73)
NRBC BLD-RTO: 0 /100 WBC
PLATELET # BLD AUTO: 267 K/UL (ref 150–450)
PMV BLD AUTO: 10.7 FL (ref 9.2–12.9)
RBC # BLD AUTO: 4.75 M/UL (ref 4.6–6.2)
TROPONIN I SERPL DL<=0.01 NG/ML-MCNC: 0.02 NG/ML (ref 0–0.03)
WBC # BLD AUTO: 8.23 K/UL (ref 3.9–12.7)

## 2024-10-11 PROCEDURE — 93005 ELECTROCARDIOGRAM TRACING: CPT

## 2024-10-11 PROCEDURE — 84484 ASSAY OF TROPONIN QUANT: CPT | Performed by: PHYSICIAN ASSISTANT

## 2024-10-11 PROCEDURE — 85025 COMPLETE CBC W/AUTO DIFF WBC: CPT | Performed by: PHYSICIAN ASSISTANT

## 2024-10-11 PROCEDURE — 99999 HC NO LEVEL OF SERVICE - ED ONLY: CPT

## 2024-10-11 PROCEDURE — 25000003 PHARM REV CODE 250: Performed by: PHYSICIAN ASSISTANT

## 2024-10-11 PROCEDURE — 93010 ELECTROCARDIOGRAM REPORT: CPT | Mod: ,,, | Performed by: INTERNAL MEDICINE

## 2024-10-11 PROCEDURE — 83880 ASSAY OF NATRIURETIC PEPTIDE: CPT | Performed by: PHYSICIAN ASSISTANT

## 2024-10-11 RX ORDER — METOPROLOL TARTRATE 50 MG/1
50 TABLET ORAL
Status: COMPLETED | OUTPATIENT
Start: 2024-10-11 | End: 2024-10-11

## 2024-10-11 RX ADMIN — METOPROLOL TARTRATE 50 MG: 50 TABLET, FILM COATED ORAL at 11:10

## 2024-10-11 NOTE — FIRST PROVIDER EVALUATION
Emergency Department TeleTriage Encounter Note      CHIEF COMPLAINT    Chief Complaint   Patient presents with    Palpitations     Hx of Afib, out of Metoprolol x 2 days - states it is at the pharmacy he just has to pick it up, NSR in triage       VITAL SIGNS   Initial Vitals [10/11/24 1134]   BP Pulse Resp Temp SpO2   (!) 151/74 96 17 98.2 °F (36.8 °C) 98 %      MAP       --            ALLERGIES    Review of patient's allergies indicates:   Allergen Reactions    Iodine and iodide containing products      States is no longer an allergy       PROVIDER TRIAGE NOTE  Patient presents with complaint of palpitations which he thinks is afib. + shortness of breath and dizziness. No chest pain.       ORDERS  Labs Reviewed - No data to display    ED Orders (720h ago, onward)      Start Ordered     Status Ordering Provider    10/11/24 1136 10/11/24 1136  EKG 12-lead  Once         Completed by CECY PIERRE on 10/11/2024 at 11:36 AM KIERSTEN HAINES              Virtual Visit Note: The provider triage portion of this emergency department evaluation and documentation was performed via [employer], a HIPAA-compliant telemedicine application, in concert with a tele-presenter in the room. A face to face patient evaluation with one of my colleagues will occur once the patient is placed in an emergency department room.      DISCLAIMER: This note was prepared with [employer] voice recognition transcription software. Garbled syntax, mangled pronouns, and other bizarre constructions may be attributed to that software system.

## 2024-10-15 LAB
OHS QRS DURATION: 86 MS
OHS QTC CALCULATION: 431 MS

## 2024-10-16 ENCOUNTER — TELEPHONE (OUTPATIENT)
Dept: FAMILY MEDICINE | Facility: CLINIC | Age: 49
End: 2024-10-16
Payer: COMMERCIAL

## 2024-10-16 NOTE — TELEPHONE ENCOUNTER
----- Message from Bria sent at 10/16/2024 11:56 AM CDT -----  Type:  Needs Medical Advice    Who Called:  brody Savage    Would the patient rather a call back or a response via [facility]?  call  Best Call Back Number:   [phone]  Additional Information:  Pt had a CT Scan done on 10.10.24 and needs further explanation and what next steps to take.

## 2024-10-18 ENCOUNTER — TELEPHONE (OUTPATIENT)
Dept: FAMILY MEDICINE | Facility: CLINIC | Age: 49
End: 2024-10-18
Payer: COMMERCIAL

## 2024-10-18 DIAGNOSIS — N32.89 BLADDER WALL THICKENING: Primary | ICD-10-CM

## 2024-10-18 NOTE — TELEPHONE ENCOUNTER
Attempted to contact the pt to give recent lab results and assist with an appointment. Message was left asking the pt to contact the office.

## 2024-10-23 DIAGNOSIS — I10 ESSENTIAL HYPERTENSION: ICD-10-CM

## 2024-10-23 RX ORDER — HYDROCHLOROTHIAZIDE 25 MG/1
25 TABLET ORAL
Qty: 90 TABLET | Refills: 3 | Status: SHIPPED | OUTPATIENT
Start: 2024-10-23

## 2024-10-23 NOTE — TELEPHONE ENCOUNTER
No care due was identified.  [facility] Embedded Care Due Messages. Reference number: [id number].   10/23/2024 6:10:33 AM CDT   Requesting refill for:oxyCODONE-acetaminophen (PERCOCET)  MG per tablet        Quantity requested: 30 day supply    Pharmacy:   [facility] -   Location or Phone Number: [phone]    Last office visit: 12/5/2019   Next office visit: Visit date not found

## 2024-10-24 ENCOUNTER — OFFICE VISIT (OUTPATIENT)
Dept: UROLOGY | Facility: CLINIC | Age: 49
End: 2024-10-24
Payer: COMMERCIAL

## 2024-10-24 VITALS
BODY MASS INDEX: 36.2 KG/M2 | HEART RATE: 65 BPM | DIASTOLIC BLOOD PRESSURE: 84 MMHG | SYSTOLIC BLOOD PRESSURE: 131 MMHG | HEIGHT: 73 IN | WEIGHT: 273.13 LBS

## 2024-10-24 DIAGNOSIS — N32.89 BLADDER WALL THICKENING: ICD-10-CM

## 2024-10-24 PROCEDURE — 3044F HG A1C LEVEL LT 7.0%: CPT | Mod: CPTII,S$GLB,, | Performed by: UROLOGY

## 2024-10-24 PROCEDURE — 99999 PR PBB SHADOW E&M-EST. PATIENT-LVL III: CPT | Mod: PBBFAC,,, | Performed by: UROLOGY

## 2024-10-24 PROCEDURE — 3079F DIAST BP 80-89 MM HG: CPT | Mod: CPTII,S$GLB,, | Performed by: UROLOGY

## 2024-10-24 PROCEDURE — 4010F ACE/ARB THERAPY RXD/TAKEN: CPT | Mod: CPTII,S$GLB,, | Performed by: UROLOGY

## 2024-10-24 PROCEDURE — 99204 OFFICE O/P NEW MOD 45 MIN: CPT | Mod: S$GLB,,, | Performed by: UROLOGY

## 2024-10-24 PROCEDURE — 3075F SYST BP GE 130 - 139MM HG: CPT | Mod: CPTII,S$GLB,, | Performed by: UROLOGY

## 2024-10-24 PROCEDURE — 1159F MED LIST DOCD IN RCRD: CPT | Mod: CPTII,S$GLB,, | Performed by: UROLOGY

## 2024-10-24 PROCEDURE — 3008F BODY MASS INDEX DOCD: CPT | Mod: CPTII,S$GLB,, | Performed by: UROLOGY

## 2024-10-24 NOTE — PROGRESS NOTES
[facility] - Urology   Clinic Note    SUBJECTIVE:     Chief Complaint   Patient presents with    bladder wall thicking       Referral from: Nuno White.    History of Present Illness:  Shane Savage is a 48 y.o. male who presents to clinic for bladder wall thickening.    48-year-old male here today for evaluation of bladder wall thickening.  Denies any significant voiding dysfunction.  Denies any hematuria.  Denies any dysuria.  Had a CT scan which revealed mild circumferential bladder wall thickening.  Imaging was reviewed.    Patient endorses no additional complaints at this time.    Past Medical History:   Diagnosis Date    Arthritis     Hypertension        Past Surgical History:   Procedure Laterality Date    gastic bypass         Family History   Problem Relation Name Age of Onset    Hypertension Mother      Diabetes Mother      Hypertension Father         Social History     Tobacco Use    Smoking status: Some Days     Current packs/day: 0.00     Average packs/day: 0.1 packs/day for 9.0 years (0.9 ttl pk-yrs)     Types: Cigarettes     Start date: 2012     Last attempt to quit: 2021     Years since quittin.8    Smokeless tobacco: Former    Tobacco comments:     Stopped smoking 3 weeks ago (today 1/3/22), using nicotine vape throughout the day     Marijuana user   Substance Use Topics    Alcohol use: Yes    Drug use: Yes     Types: Marijuana       Current Outpatient Medications on File Prior to Visit   Medication Sig Dispense Refill    ascorbic acid, vitamin C, (VITAMIN C) 1000 MG tablet Take 1,000 mg by mouth once daily.      diphenhydrAMINE (BENADRYL) 50 MG capsule Take 50mg by mouth 1 hour before contrast administration. 1 capsule 0    hydroCHLOROthiazide (HYDRODIURIL) 25 MG tablet Take 1 tablet by mouth once daily 90 tablet 3    losartan (COZAAR) 25 MG tablet Take 1 tablet by mouth once daily 90 tablet 3    metoprolol succinate (TOPROL-XL) 50 MG 24 hr tablet Take 1 tablet (50 mg total) by  "mouth once daily. 90 tablet 3    predniSONE (DELTASONE) 50 MG Tab Take 50mg by mouth at 13 hours, 7 hours, and 1 hour before contrast administration. 3 tablet 0     No current facility-administered medications on file prior to visit.       Review of patient's allergies indicates:   Allergen Reactions    Iodine and iodide containing products      States is no longer an allergy       Review of Systems:  A review of 10+ systems was conducted with pertinent positive and negative findings documented in HPI with all other systems reviewed and negative.    OBJECTIVE:     Estimated body mass index is 36.04 kg/m² as calculated from the following:    Height as of this encounter: 6' 1" (1.854 m).    Weight as of this encounter: 123.9 kg (273 lb 2.4 oz).    Vital Signs (Most Recent)  Vitals:    10/24/24 1042   BP: 131/84   Pulse: 65       Physical Exam:  GENERAL: patient sitting comfortably  HEENT: normocephalic  NECK: supple, no JVD  PULM: normal chest rise, no increased WOB  HEART: non-diaphoretic  ABDO: soft, nondistended, nontender  BACK: no CVA tenderness bilaterally  SKIN: warm, dry, well perfused  EXT: no bruising or edema  NEURO: grossly normal with no focal deficits  PSYCH: appropriate mood and affect    Genitourinary Exam:  deferred    Lab Results   Component Value Date    BUN 8 10/04/2024    CREATININE 1.0 10/04/2024    WBC 8.23 10/11/2024    HGB 15.4 10/11/2024    HCT 45.9 10/11/2024     10/11/2024    AST 22 10/04/2024    ALT 19 10/04/2024    ALKPHOS 37 (L) 10/04/2024    ALBUMIN 3.4 (L) 10/04/2024    HGBA1C 5.4 09/09/2024    INR 1.1 09/15/2009        Imaging:  I have personally reviewed all relevant imaging studies.    Results for orders placed or performed during the hospital encounter of 10/10/24 (from the past 2160 hours)   CT Abdomen Pelvis W Wo Contrast    Narrative    EXAMINATION:  CT ABDOMEN PELVIS W WO CONTRAST    CLINICAL HISTORY:  Abdominal pain, acute, nonlocalized;Upper abdominal pain, " unspecified    TECHNIQUE:  Pre and postcontrast images were obtained through the abdomen and pelvis per protocol.  Coronal and sagittal images were reviewed.  A 1000 cc oral contrast and 100 cc intravenous contrast was administered.    COMPARISON:  Abdominal radiograph dated 04/25/2017.    FINDINGS:  Limited images through the lower chest are unremarkable.    Abdomen and pelvis:    Liver is mildly enlarged.  The gallbladder, spleen, pancreas, adrenal glands, and kidneys (aside from left midpole cortical hypodensity, too small to characterize) appear normal.  Prostate is nonenlarged.  Mild circumferential bladder wall thickening.    Postsurgical change of gastric bypass.  Appendix is not well visualized though without significant inflammatory change or stranding at the expected location.  No significant free fluid or pathologic adenopathy.  Mild iliac atherosclerosis.    Lumbar levocurvature with spine DJD.  No aggressive osseous lesion.      Impression    Prior gastric bypass.    Mild circumferential bladder wall thickening.  Differential considerations to include but not limited to sequela of under-distension or cystitis.  Further correlation including UA as warranted.    Additional findings as above.      Electronically signed by: Mat Galvan  Date:    10/10/2024  Time:    15:26     No results found for this or any previous visit (from the past 2160 hours).  CT Abdomen Pelvis W Wo Contrast  Narrative: EXAMINATION:  CT ABDOMEN PELVIS W WO CONTRAST    CLINICAL HISTORY:  Abdominal pain, acute, nonlocalized;Upper abdominal pain, unspecified    TECHNIQUE:  Pre and postcontrast images were obtained through the abdomen and pelvis per protocol.  Coronal and sagittal images were reviewed.  A 1000 cc oral contrast and 100 cc intravenous contrast was administered.    COMPARISON:  Abdominal radiograph dated 04/25/2017.    FINDINGS:  Limited images through the lower chest are unremarkable.    Abdomen and pelvis:    Liver  "is mildly enlarged.  The gallbladder, spleen, pancreas, adrenal glands, and kidneys (aside from left midpole cortical hypodensity, too small to characterize) appear normal.  Prostate is nonenlarged.  Mild circumferential bladder wall thickening.    Postsurgical change of gastric bypass.  Appendix is not well visualized though without significant inflammatory change or stranding at the expected location.  No significant free fluid or pathologic adenopathy.  Mild iliac atherosclerosis.    Lumbar levocurvature with spine DJD.  No aggressive osseous lesion.  Impression: Prior gastric bypass.    Mild circumferential bladder wall thickening.  Differential considerations to include but not limited to sequela of under-distension or cystitis.  Further correlation including UA as warranted.    Additional findings as above.    Electronically signed by: Mat Galvan  Date:    10/10/2024  Time:    15:26       PSA:  Lab Results   Component Value Date    PSA 0.51 06/07/2021    PSA 0.44 06/24/2019    PSA 0.40 03/08/2018    PSA 0.39 03/14/2017    PSA 0.46 03/21/2016       Testosterone:  No results found for: "TOTALTESTOST", "TESTOSTERONE"     ASSESSMENT     1. Bladder wall thickening        PLAN:     Imaging reviewed, no acute concerns, bladder wall thickening is an unremarkable finding  Can follow up with me LORA Henriquez MD  Urology  [facility]    Disclaimer: This note has been generated using voice-recognition software. There may be typographical errors that have been missed during proof-reading.     "

## 2025-01-29 DIAGNOSIS — I10 ESSENTIAL HYPERTENSION: ICD-10-CM

## 2025-01-30 RX ORDER — LOSARTAN POTASSIUM 25 MG/1
25 TABLET ORAL
Qty: 90 TABLET | Refills: 2 | Status: SHIPPED | OUTPATIENT
Start: 2025-01-30

## 2025-01-30 NOTE — TELEPHONE ENCOUNTER
Refill Decision Note   Shane Savage  is requesting a refill authorization.  Brief Assessment and Rationale for Refill:  Approve     Medication Therapy Plan:  ED visit for palpitations. FOVS. Approve 90 with 2      Comments:     Note composed:9:15 AM 01/30/2025             Appointments     Last Visit   10/4/2024 Nuno White MD   Next Visit   4/7/2025 Nuno White MD

## 2025-01-30 NOTE — TELEPHONE ENCOUNTER
No care due was identified.  [facility] Embedded Care Due Messages. Reference number: [id number].   1/29/2025 6:08:14 PM CST

## 2025-01-31 RX ORDER — METOPROLOL SUCCINATE 50 MG/1
50 TABLET, EXTENDED RELEASE ORAL
Qty: 90 TABLET | Refills: 3 | Status: SHIPPED | OUTPATIENT
Start: 2025-01-31

## 2025-02-27 ENCOUNTER — HOSPITAL ENCOUNTER (EMERGENCY)
Facility: HOSPITAL | Age: 50
Discharge: HOME | End: 2025-02-27
Attending: EMERGENCY MEDICINE
Payer: COMMERCIAL

## 2025-02-27 VITALS
WEIGHT: 288 LBS | SYSTOLIC BLOOD PRESSURE: 123 MMHG | RESPIRATION RATE: 15 BRPM | OXYGEN SATURATION: 97 % | DIASTOLIC BLOOD PRESSURE: 65 MMHG | TEMPERATURE: 99 F | HEART RATE: 60 BPM | BODY MASS INDEX: 38 KG/M2

## 2025-02-27 DIAGNOSIS — E86.0 DEHYDRATION: Primary | ICD-10-CM

## 2025-02-27 DIAGNOSIS — R00.2 PALPITATIONS: ICD-10-CM

## 2025-02-27 DIAGNOSIS — R10.9 ABDOMINAL CRAMPING: ICD-10-CM

## 2025-02-27 LAB
ALBUMIN SERPL BCP-MCNC: 4 G/DL (ref 3.5–5.2)
ALP SERPL-CCNC: 49 U/L (ref 40–150)
ALT SERPL W/O P-5'-P-CCNC: 41 U/L (ref 10–44)
ANION GAP SERPL CALC-SCNC: 7 MMOL/L (ref 8–16)
AST SERPL-CCNC: 72 U/L (ref 10–40)
BASOPHILS # BLD AUTO: 0.02 K/UL (ref 0–0.2)
BASOPHILS NFR BLD: 0.4 % (ref 0–1.9)
BILIRUB SERPL-MCNC: 0.6 MG/DL (ref 0.1–1)
BUN SERPL-MCNC: 16 MG/DL (ref 6–20)
CALCIUM SERPL-MCNC: 9.1 MG/DL (ref 8.7–10.5)
CHLORIDE SERPL-SCNC: 105 MMOL/L (ref 95–110)
CO2 SERPL-SCNC: 25 MMOL/L (ref 23–29)
CREAT SERPL-MCNC: 1.3 MG/DL (ref 0.5–1.4)
DIFFERENTIAL METHOD BLD: ABNORMAL
EOSINOPHIL # BLD AUTO: 0.1 K/UL (ref 0–0.5)
EOSINOPHIL NFR BLD: 2.1 % (ref 0–8)
ERYTHROCYTE [DISTWIDTH] IN BLOOD BY AUTOMATED COUNT: 12.9 % (ref 11.5–14.5)
EST. GFR  (NO RACE VARIABLE): >60 ML/MIN/1.73 M^2
GLUCOSE SERPL-MCNC: 92 MG/DL (ref 70–110)
HCT VFR BLD AUTO: 41.2 % (ref 40–54)
HGB BLD-MCNC: 13.9 G/DL (ref 14–18)
IMM GRANULOCYTES # BLD AUTO: 0.01 K/UL (ref 0–0.04)
IMM GRANULOCYTES NFR BLD AUTO: 0.2 % (ref 0–0.5)
LYMPHOCYTES # BLD AUTO: 1.9 K/UL (ref 1–4.8)
LYMPHOCYTES NFR BLD: 38.5 % (ref 18–48)
MAGNESIUM SERPL-MCNC: 1.8 MG/DL (ref 1.6–2.6)
MCH RBC QN AUTO: 32.5 PG (ref 27–31)
MCHC RBC AUTO-ENTMCNC: 33.7 G/DL (ref 32–36)
MCV RBC AUTO: 96 FL (ref 82–98)
MONOCYTES # BLD AUTO: 0.4 K/UL (ref 0.3–1)
MONOCYTES NFR BLD: 8.7 % (ref 4–15)
NEUTROPHILS # BLD AUTO: 2.4 K/UL (ref 1.8–7.7)
NEUTROPHILS NFR BLD: 50.1 % (ref 38–73)
NRBC BLD-RTO: 0 /100 WBC
PLATELET # BLD AUTO: 236 K/UL (ref 150–450)
PMV BLD AUTO: 9.5 FL (ref 9.2–12.9)
POTASSIUM SERPL-SCNC: 3.7 MMOL/L (ref 3.5–5.1)
PROT SERPL-MCNC: 7.4 G/DL (ref 6–8.4)
RBC # BLD AUTO: 4.28 M/UL (ref 4.6–6.2)
SODIUM SERPL-SCNC: 137 MMOL/L (ref 136–145)
TROPONIN I SERPL DL<=0.01 NG/ML-MCNC: <0.006 NG/ML (ref 0–0.03)
WBC # BLD AUTO: 4.81 K/UL (ref 3.9–12.7)

## 2025-02-27 PROCEDURE — 99285 EMERGENCY DEPT VISIT HI MDM: CPT | Mod: 25

## 2025-02-27 PROCEDURE — 83735 ASSAY OF MAGNESIUM: CPT | Performed by: EMERGENCY MEDICINE

## 2025-02-27 PROCEDURE — 85025 COMPLETE CBC W/AUTO DIFF WBC: CPT | Performed by: EMERGENCY MEDICINE

## 2025-02-27 PROCEDURE — 80053 COMPREHEN METABOLIC PANEL: CPT | Performed by: EMERGENCY MEDICINE

## 2025-02-27 PROCEDURE — 93010 ELECTROCARDIOGRAM REPORT: CPT | Mod: ,,, | Performed by: INTERNAL MEDICINE

## 2025-02-27 PROCEDURE — 93005 ELECTROCARDIOGRAM TRACING: CPT

## 2025-02-27 PROCEDURE — 84484 ASSAY OF TROPONIN QUANT: CPT | Performed by: EMERGENCY MEDICINE

## 2025-02-28 NOTE — DISCHARGE INSTRUCTIONS
Please make sure you are drinking plenty of fluids.  Please call your primary care physician for a follow-up appointment for re-evaluation.  Please return with any new or worsening symptoms.

## 2025-02-28 NOTE — ED NOTES
Patient presents to ED room 3 with CC of feeling heart palpations. Patient reports along with feeling heart palpations he was feeling lightheaded and dizzy. Patient denies any SOB or trouble breathing at this time. Patient report he has a history of A-fib. Patient denies any other PMH at this time. Patient AAOX4, respirations even and unlabored, and in no acute distress at this time. Patient connected to continous monitoring at this time. Bed locked and in lowest position. Patient denies any needs at this time. Call bell within reach.

## 2025-02-28 NOTE — ED PROVIDER NOTES
"Encounter Date: 2/27/2025       History     Chief Complaint   Patient presents with    Palpitations     C/o intermittent palpitations dizziness, without chest pain, for 1 hour. Hx of afib, states is compliant with medicine.      49-year-old male presents emergency department complaining of 2 or 3 hours of palpitations, lightheadedness.  Patient states he has history of paroxysmal AFib and he felt like he might be "going back into AFib".  Symptoms have improved a little bit at this time, he is not currently experiencing any palpitations.  He denies any chest pain or shortness of breath but did feel little fatigued.  Denies any nausea or vomiting.  No eliciting, exacerbating, or alleviating factors reported although he notes that this started after he had cut the grass.  Also notes that he had some upper abdominal cramping earlier today that has resolved at this time.  No other symptoms reported at this time.      Review of patient's allergies indicates:   Allergen Reactions    Iodine and iodide containing products      States is no longer an allergy     Past Medical History:   Diagnosis Date    Arthritis     Hypertension      Past Surgical History:   Procedure Laterality Date    gastic bypass       Family History   Problem Relation Name Age of Onset    Hypertension Mother      Diabetes Mother      Hypertension Father       Social History[1]  Review of Systems   Constitutional:  Negative for chills and fever.   HENT:  Negative for congestion.    Respiratory:  Negative for cough and shortness of breath.    Cardiovascular:  Positive for palpitations. Negative for chest pain.   Gastrointestinal:  Positive for abdominal pain.   Musculoskeletal:  Negative for back pain.   Neurological:  Positive for light-headedness. Negative for headaches.       Physical Exam     Initial Vitals [02/27/25 1944]   BP Pulse Resp Temp SpO2   130/79 102 16 98.6 °F (37 °C) 100 %      MAP       --         Physical Exam    Nursing note and vitals " reviewed.  Constitutional: He appears well-developed and well-nourished. No distress.   HENT:   Head: Normocephalic and atraumatic.   Eyes: Conjunctivae and EOM are normal. Pupils are equal, round, and reactive to light.   Neck: Neck supple. No tracheal deviation present.   Normal range of motion.  Cardiovascular:  Normal rate and intact distal pulses.           Pulmonary/Chest: No respiratory distress.   Abdominal: Abdomen is soft. He exhibits no distension. There is no abdominal tenderness.   Musculoskeletal:         General: No tenderness or edema. Normal range of motion.      Cervical back: Normal range of motion and neck supple.     Neurological: He is alert and oriented to person, place, and time. He has normal strength. No cranial nerve deficit. GCS score is 15. GCS eye subscore is 4. GCS verbal subscore is 5. GCS motor subscore is 6.   Skin: Skin is warm and dry.         ED Course   Procedures  Labs Reviewed   CBC W/ AUTO DIFFERENTIAL - Abnormal       Result Value    WBC 4.81      RBC 4.28 (*)     Hemoglobin 13.9 (*)     Hematocrit 41.2      MCV 96      MCH 32.5 (*)     MCHC 33.7      RDW 12.9      Platelets 236      MPV 9.5      Immature Granulocytes 0.2      Gran # (ANC) 2.4      Immature Grans (Abs) 0.01      Lymph # 1.9      Mono # 0.4      Eos # 0.1      Baso # 0.02      nRBC 0      Gran % 50.1      Lymph % 38.5      Mono % 8.7      Eosinophil % 2.1      Basophil % 0.4      Differential Method Automated     COMPREHENSIVE METABOLIC PANEL - Abnormal    Sodium 137      Potassium 3.7      Chloride 105      CO2 25      Glucose 92      BUN 16      Creatinine 1.3      Calcium 9.1      Total Protein 7.4      Albumin 4.0      Total Bilirubin 0.6      Alkaline Phosphatase 49      AST 72 (*)     ALT 41      eGFR >60      Anion Gap 7 (*)    TROPONIN I    Troponin I <0.006     MAGNESIUM    Magnesium 1.8       EKG Readings: (Independently Interpreted)   Initial Reading: No STEMI. Previous EKG: Compared with most  recent EKG Previous EKG Date: 10/11/2024 (Minimal change). Rhythm: Normal Sinus Rhythm. Heart Rate: 97. Ectopy: PVCs. Conduction: Normal. ST Segments: Normal ST Segments. Axis: Normal.   EKG independently interpreted by me pending Cardiology review           X-Rays:   Independently Interpreted Readings:   Other Readings:  X-ray chest independently interpreted by me pending radiology review:  No acute infiltrate, no pneumothorax, no effusion      X-ray abdomen flat and erect independently interpreted by me pending radiology review:  Nonobstructive bowel gas pattern, no free air    Imaging Results              X-Ray Abdomen Flat And Erect (Preliminary result)  Result time 02/27/25 22:12:19      Wet Read by Syed Clifton MD (02/27/25 22:12:19, [facility], Emergency Medicine)    Nonobstructive bowel gas pattern, no free air                                     X-Ray Chest AP Portable (Final result)  Result time 02/27/25 21:08:55      Final result by Shweta Rodriguez MD (02/27/25 21:08:55)                   Impression:      No acute abnormality.      Electronically signed by: Shweta Rodriguez  Date:    02/27/2025  Time:    21:08               Narrative:    EXAMINATION:  XR CHEST AP PORTABLE    CLINICAL HISTORY:  Palpitations;    TECHNIQUE:  Single frontal view of the chest was performed.    COMPARISON:  10/25/2023 chest x-ray    FINDINGS:  Cardiac silhouette is not enlarged.  Left hemidiaphragm elevation noted.  No acute lung parenchymal or pleural abnormality seen.  Osseous structures grossly appear intact.                                      Medications - No data to display  Medical Decision Making  49-year-old male presents emergency department complaining of lightheadedness and palpitations      Differential: Dehydration, arrhythmia, vasovagal, anemia, electrolyte dyscrasia      Labs benign.  Patient has been asymptomatic throughout his emergency department visit.  Informed of results as well  as plan to discharge with instructions on home management, increasing oral hydration, follow up with primary care physician, strict return precautions given.  Vital signs stable, patient comfortable with discharge at this time.    Problems Addressed:  Abdominal cramping: acute illness or injury  Dehydration: acute illness or injury  Palpitations: acute illness or injury    Amount and/or Complexity of Data Reviewed  External Data Reviewed: ECG and notes.     Details: Reviewed most recent EKG for comparison    Reviewed most recent urology note documenting baseline medications and past medical history  Labs: ordered.     Details: CBC without leukocytosis, normal H&H; CMP with mild worsening of creatinine, normal LFTs and electrolytes; troponin negative  Radiology: ordered and independent interpretation performed. Decision-making details documented in ED Course.  ECG/medicine tests: ordered and independent interpretation performed. Decision-making details documented in ED Course.    Risk  OTC drugs.  Prescription drug management.                                      Clinical Impression:  Final diagnoses:  [R00.2] Palpitations  [R10.9] Abdominal cramping  [E86.0] Dehydration (Primary)          ED Disposition Condition    Discharge Stable          ED Prescriptions    None       Follow-up Information       Follow up With Specialties Details Why Contact Info    Nuno White MD Family Medicine Schedule an appointment as soon as possible for a visit   [address] [facility] [address]  [phone]                 [1]   Social History  Tobacco Use    Smoking status: Some Days     Current packs/day: 0.00     Average packs/day: 0.1 packs/day for 9.0 years (0.9 ttl pk-yrs)     Types: Cigarettes     Start date: 12/2012     Last attempt to quit: 12/2021     Years since quitting: 3.2    Smokeless tobacco: Former    Tobacco comments:     Stopped smoking 3 weeks ago (today 1/3/22), using nicotine vape throughout the  day     Marijuana user   Substance Use Topics    Alcohol use: Yes    Drug use: Yes     Types: Marijuana        Syed Clifton MD  02/27/25 [id number]

## 2025-03-05 ENCOUNTER — OFFICE VISIT (OUTPATIENT)
Dept: FAMILY MEDICINE | Facility: CLINIC | Age: 50
End: 2025-03-05
Payer: COMMERCIAL

## 2025-03-05 ENCOUNTER — PATIENT OUTREACH (OUTPATIENT)
Dept: ADMINISTRATIVE | Facility: OTHER | Age: 50
End: 2025-03-05
Payer: COMMERCIAL

## 2025-03-05 VITALS
BODY MASS INDEX: 36.52 KG/M2 | HEART RATE: 72 BPM | WEIGHT: 275.56 LBS | DIASTOLIC BLOOD PRESSURE: 68 MMHG | OXYGEN SATURATION: 98 % | SYSTOLIC BLOOD PRESSURE: 110 MMHG | HEIGHT: 73 IN

## 2025-03-05 DIAGNOSIS — R00.2 PALPITATIONS: ICD-10-CM

## 2025-03-05 DIAGNOSIS — Z86.79 HISTORY OF ATRIAL FLUTTER: Primary | ICD-10-CM

## 2025-03-05 DIAGNOSIS — M54.50 ACUTE BILATERAL LOW BACK PAIN, UNSPECIFIED WHETHER SCIATICA PRESENT: ICD-10-CM

## 2025-03-05 DIAGNOSIS — Z72.0 SMOKING TRYING TO QUIT: ICD-10-CM

## 2025-03-05 DIAGNOSIS — E66.01 SEVERE OBESITY WITH BODY MASS INDEX (BMI) OF 35.0 TO 39.9 WITH COMORBIDITY: ICD-10-CM

## 2025-03-05 PROCEDURE — 99999 PR PBB SHADOW E&M-EST. PATIENT-LVL V: CPT | Mod: PBBFAC,,,

## 2025-03-05 RX ORDER — METHOCARBAMOL 500 MG/1
500 TABLET, FILM COATED ORAL 4 TIMES DAILY PRN
Qty: 30 TABLET | Refills: 0 | Status: SHIPPED | OUTPATIENT
Start: 2025-03-05 | End: 2025-03-15

## 2025-03-06 LAB
OHS QRS DURATION: 86 MS
OHS QTC CALCULATION: 424 MS

## 2025-03-06 NOTE — PROGRESS NOTES
[facility]    3/5/2025      Subjective:       Patient ID:  Shane is a 49 y.o. male .  has a past medical history of Arthritis and Hypertension.    History of Present Illness    CHIEF COMPLAINT:  Mr. Savage presents today for follow up after an ED visit for palpitations.    HISTORY OF PRESENT ILLNESS:  He experienced three episodes of palpitations while cutting grass at his parents' house, accompanied by breathing difficulties and lightheadedness. He was noted to be dehydrated during these episodes and denies headache. He reports decreased appetite, stating he has to force himself to eat due to lack of hunger sensations as well as him constantly being on the go.  He was diagnosed with atrial flutter in October 2023.Overall patient did not have any ot the symptoms that brought him to the ED today or at time of visit. Does recall a brief palpitations but states that he was able to sit down for a minute a take a break and he felt better. Does endorse that he is overworked at times.  MEDICATIONS:  He takes Metoprolol, Losartan, and hydrochlorothiazide. His supplements include flaxseed oil and a daily multivitamin, though he reports inconsistent use of the multivitamin.      ROS:  General: -fever, -chills, -fatigue, -weight gain, +weight loss, +appetite changes  Eyes: -vision changes, -redness, -discharge  ENT: -ear pain, -nasal congestion, -sore throat  Cardiovascular: -chest pain, +palpitations, -lower extremity edema  Respiratory: -cough, +shortness of breath  Gastrointestinal: -abdominal pain, -nausea, -vomiting, -diarrhea, -constipation, -blood in stool  Genitourinary: -dysuria, -hematuria, -frequency  Musculoskeletal: -joint pain, -muscle pain  Skin: -rash, -lesion  Neurological: -headache, +dizziness, -numbness, -tingling  Psychiatric: -anxiety, -depression, -sleep difficulty           Problem List[1]      Last HgbA1C:    Lab Results   Component Value Date    HGBA1C 5.4  "09/09/2024    HGBA1C 5.2 08/02/2023    HGBA1C 5.6 12/06/2021         Last Lipid Panel:    Lab Results   Component Value Date    HDL 48 09/09/2024    HDL 51 01/24/2023    HDL 54 04/19/2022       Lab Results   Component Value Date    LDLCALC 45.2 (L) 09/09/2024    LDLCALC 58.2 (L) 01/24/2023    LDLCALC 72.4 04/19/2022       Lab Results   Component Value Date    TRIG 59 09/09/2024    TRIG 99 01/24/2023    TRIG 133 04/19/2022       Lab Results   Component Value Date    CHOLHDL 45.7 09/09/2024    CHOLHDL 39.5 01/24/2023    CHOLHDL 35.3 04/19/2022         Review of patient's allergies indicates:   Allergen Reactions    Iodine and iodide containing products      States is no longer an allergy        Medication List with Changes/Refills   New Medications    METHOCARBAMOL (ROBAXIN) 500 MG TAB    Take 1 tablet (500 mg total) by mouth 4 (four) times daily as needed.   Current Medications    ASCORBIC ACID, VITAMIN C, (VITAMIN C) 1000 MG TABLET    Take 1,000 mg by mouth once daily.    DIPHENHYDRAMINE (BENADRYL) 50 MG CAPSULE    Take 50mg by mouth 1 hour before contrast administration.    HYDROCHLOROTHIAZIDE (HYDRODIURIL) 25 MG TABLET    Take 1 tablet by mouth once daily    LOSARTAN (COZAAR) 25 MG TABLET    Take 1 tablet by mouth once daily    METOPROLOL SUCCINATE (TOPROL-XL) 50 MG 24 HR TABLET    Take 1 tablet by mouth once daily    PREDNISONE (DELTASONE) 50 MG TAB    Take 50mg by mouth at 13 hours, 7 hours, and 1 hour before contrast administration.               Objective:      /68 (BP Location: Left arm, Patient Position: Sitting)   Pulse 72   Ht 6' 1" (1.854 m)   Wt 125 kg (275 lb 9.2 oz)   SpO2 98%   BMI 36.36 kg/m²   Estimated body mass index is 36.36 kg/m² as calculated from the following:    Height as of this encounter: 6' 1" (1.854 m).    Weight as of this encounter: 125 kg (275 lb 9.2 oz).    Physical Exam  Vitals reviewed.   Constitutional:       Appearance: Normal appearance.   HENT:      Head: " Normocephalic.      Nose: Nose normal.      Mouth/Throat:      Mouth: Mucous membranes are moist.   Eyes:      Conjunctiva/sclera: Conjunctivae normal.   Cardiovascular:      Rate and Rhythm: Normal rate and regular rhythm.      Heart sounds: Normal heart sounds.   Pulmonary:      Effort: No respiratory distress.      Breath sounds: Normal breath sounds.   Abdominal:      Tenderness: There is no abdominal tenderness.   Musculoskeletal:         General: Tenderness (in lumbar spine) present.      Cervical back: Normal range of motion.   Skin:     General: Skin is warm.   Neurological:      Mental Status: He is oriented to person, place, and time.   Psychiatric:         Mood and Affect: Mood normal.             Assessment and Plan:   1. History of atrial flutter  - Ambulatory referral/consult to Cardiology; Future    2. Palpitations  - Ambulatory referral/consult to Cardiology; Future    3. Severe obesity with body mass index (BMI) of 35.0 to 39.9 with comorbidity    4. Acute bilateral low back pain, unspecified whether sciatica present  - methocarbamoL (ROBAXIN) 500 MG Tab; Take 1 tablet (500 mg total) by mouth 4 (four) times daily as needed.  Dispense: 30 tablet; Refill: 0    5. Smoking trying to quit  - Ambulatory referral/consult to Smoking Cessation Program; Future     Assessment & Plan    - Considered cardiac monitoring for 72 hours to assess changes in known atrial flutter and recent palpitations  - Evaluated need for cardiology follow-up given history of atrial flutter and recent palpitations with suspected dehydration  - Considered muscle relaxer trial for lumbar back pain   Noted the patient's history of atrial flutter and recent episodes that calmed down by the time he reached the hospital.   Considered ordering a 72-hour cardiac monitor to reassess the patient's condition and determine the cause of ongoing palpitations will defer to cardiology pending appointment date is in near future.   Continued the  patient's current medication of Metoprolol.   Referred the patient to Cardiology for follow-up due to history of atrial flutter and recent palpitations.   Considered various factors that could be causing the palpitations, including dehydration and stress.   Noted the patient's previous diagnosis of dehydration during a hospital visit.   Acknowledged dehydration as a potential contributing factor to the patient's symptoms.   Educated the patient on the importance of increasing water intake as well as increasing meals and the importance of a balanced diet.   Recommend improving diet to match activity level and work demands.   Noted the patient's complaint of lumbar back pain.   Prescribed a muscle relaxer for 10 days.   Recommend stretching before work and improving posture.  Will follow up for further work up in pain persist.   Reviewed stretching before work and maintaining better posture for back pain management.   Mr. Savage to implement stretching routine before work and maintain better posture throughout the day.   Discussed importance of listening to body when feeling exhausted and explained importance of proper rest.  Pt also express interest in smoking cessation and states that he he doesn't even know why he still smokes and he is ready to quit.  Will schedule repeat CMP due to AST being slightly elevated  FOLLOW UP:   Follow up for annual visit with Dr. Holden in April.         The patient was informed of the following statements     Emergency Care:Seek immediate medical attention in the emergency room if you experience any new or worsening symptoms, or if your current condition significantly changes or becomes more severe.  Patient Acknowledgment: Patient verbalizes understanding of the plan and agrees to proceed with the recommended care.    Follow Up:  4/7/2025 Nuno White MD                  No follow-ups on file.    Other Orders Placed This Visit:  Orders Placed This Encounter   Procedures     Ambulatory referral/consult to Cardiology    Ambulatory referral/consult to Smoking Cessation Program         This note was generated with the assistance of ambient listening technology. Verbal consent was obtained by the patient and accompanying visitor(s) for the recording of patient appointment to facilitate this note. I attest to having reviewed and edited the generated note for accuracy, though some syntax or spelling errors may persist. Please contact the author of this note for any clarification.    I spent a total of 30 minutes on the day of the visit.This includes face to face time and non-face to face time preparing to see the patient (eg, review of tests), obtaining and/or reviewing separately obtained history, documenting clinical information in the electronic or other health record, independently interpreting results and communicating results to the patient/family/caregiver, or care coordinator.      Seth Conn PA-C             [1]   Patient Active Problem List  Diagnosis    Severe obesity with body mass index (BMI) of 35.0 to 39.9 with comorbidity    Lumbar facet arthropathy    Patellofemoral syndrome    Marijuana user    Decreased range of motion (ROM) of right knee    Decreased strength    Decreased mobility    Essential hypertension    Fatigue    Plantar fasciitis    Chronic lower back pain    Wears contact lenses    Current every day smoker

## 2025-03-09 PROBLEM — I48.92 PAROXYSMAL ATRIAL FLUTTER: Status: ACTIVE | Noted: 2025-03-09

## 2025-03-09 NOTE — PROGRESS NOTES
Cardiology Clinic Visit    History of Present Illness:       Shane Savage is a pleasant 49 y.o. male with medical issues noted below in assessment/plan    3/10/25  3/10/25  While cutting grass felt episode of heart racing. Self resolved but when he got home started feeling again and was on his way to the hospital via EMS. Stopped when he arrived to ED. Similar to previous afib episode.   Has had decreased appetitite and losing weight. Fatigue and exertional SOB. Does have hx of gastric bypass.  No BP measures at home, usually okay.   Still smoking.     History obtained by patient interview and supplemented by nursing documentation and chart review.   Objective   PMHx:  has a past medical history of Arthritis and Hypertension.   SurgHx:  has a past surgical history that includes gastic bypass.   FamHx: family history includes Diabetes in his mother; Hypertension in his father and mother.   SocialHx:  reports that he has been smoking cigarettes. He started smoking about 12 years ago. He has a 0.9 pack-year smoking history. He has quit using smokeless tobacco. He reports current alcohol use. He reports current drug use. Drug: Marijuana.  Home Meds:  Current Outpatient Medications   Medication Instructions    ascorbic acid (vitamin C) (VITAMIN C) 1,000 mg, Daily    diphenhydrAMINE (BENADRYL) 50 MG capsule Take 50mg by mouth 1 hour before contrast administration.    hydroCHLOROthiazide (HYDRODIURIL) 25 mg, Oral    losartan (COZAAR) 25 mg, Oral    methocarbamoL (ROBAXIN) 500 mg, Oral, 4 times daily PRN    metoprolol succinate (TOPROL-XL) 50 mg, Oral    predniSONE (DELTASONE) 50 MG Tab Take 50mg by mouth at 13 hours, 7 hours, and 1 hour before contrast administration.     Objective/Exam:   There were no vitals taken for this visit.   Wt Readings from Last 4 Encounters:   03/05/25 125 kg (275 lb 9.2 oz)   02/27/25 130.6 kg (288 lb)   10/24/24 123.9 kg (273 lb 2.4 oz)   10/11/24 127 kg (280 lb)      Constitutional: NAD,  "Atraumatic, Conversant   HEENT: MMM, Sclera anicteric, No JVD   Cardiovascular: RRR, no murmurs noted, no chest tenderness to palpation, 2+ radial pulses b/l  Pulmonary: normal respiratory effort, CTAB, no crackles, wheezes  Abdominal: S/NT/ND  Musculoskeletal: No lower extremity edema noted b/l  Neurological: No gross neurological deficits  Skin: W/D/I  Psych: Appropriate affect, normal mood  Labs/Imaging/Procedures   Personally reviewed  Lab Results   Component Value Date     02/27/2025    K 3.7 02/27/2025     02/27/2025    CO2 25 02/27/2025    BUN 16 02/27/2025    CREATININE 1.3 02/27/2025    ANIONGAP 7 (L) 02/27/2025     Lab Results   Component Value Date    HGBA1C 5.4 09/09/2024     Lab Results   Component Value Date    BNP 51 10/11/2024    BNP 30 10/25/2023    BNP 33 08/27/2019      Lab Results   Component Value Date    WBC 4.81 02/27/2025    HGB 13.9 (L) 02/27/2025    HCT 41.2 02/27/2025     02/27/2025    GRAN 2.4 02/27/2025    GRAN 50.1 02/27/2025     Lab Results   Component Value Date    CHOL 105 (L) 09/09/2024    HDL 48 09/09/2024    LDLCALC 45.2 (L) 09/09/2024    LDLCALC 58.2 (L) 01/24/2023    LDLCALC 72.4 04/19/2022    TRIG 59 09/09/2024     Lab Results   Component Value Date    TSH 0.838 09/09/2024     No results found for: "APOLIPOPROTE"  No results found for: "LIPOA"       TTE:  Results for orders placed during the hospital encounter of 12/26/23    Echo Saline Bubble? No    Interpretation Summary    Left Ventricle: The left ventricle is normal in size. Normal wall thickness. Normal wall motion. There is normal systolic function with a visually estimated ejection fraction of 60 - 65%. There is normal diastolic function.    Right Ventricle: Mild right ventricular enlargement. Wall thickness is normal. Right ventricle wall motion  is normal. Systolic function is normal.    The left atrium is mildly dilated.    Pulmonary Artery: The estimated pulmonary artery systolic pressure is 21 " mmHg.    IVC/SVC: Normal venous pressure at 3 mmHg.    Stress Testing:   No results found for this or any previous visit.     Coronary Angiogram:  No results found for this or any previous visit.      Personal: Used to work at [employer] then closed. No works as  for [employer]. Christopher thorpe. Father is      Time spent on this visit included personally:  -Reviewing Medical records & lab results  -Independently reviewing and interpreting (if not documented by myself) EKGs, echocardiograms, catherizations   -Obtaining a history, performing a relevant exam, counseling/educating the patient/family  -Documenting clinical information in the EMR including ordering of tests  -Care coordination and communicating with other health care providers       Problem List:     1. Paroxysmal atrial flutter    2. Essential hypertension    3. Severe obesity with body mass index (BMI) of 35.0 to 39.9 with comorbidity    4. Current every day smoker      Assessment/Plan:   Shane Savage is a 49 y.o. male with HTN, early family hx of CAD (father MI 50s) current smoker, parox aflutter not on AC ehsan w/ recent ED visit for palpitations suspected flutter which self terminated.  Hypertensive today, exam benign, ECG sinus Kirt otherwise normal.    -stress echo TTE to r/o ischemia/LVH for possible 1c use  -holter for subclinical afib/flutter  -continue home bp measurement -elevated in clinic today but normal in past  -cont XL 50  -RADHA referral (had previous hx however after gastric bypass was told he didn't need CPAP anymore)  -smoking cessation  -discussed medical therapy with rhythm control agents versus ablation (suspect but do well as it does appear he is mostly in typical atrial flutter).  We will discuss further after above tests.    Continue remainder of Current Medications[1]     Thank you for the opportunity to care for this patient. Please don't hesitate to reach out with any questions/concerns.    Speech recognition software used for parts of this note. Please excuse grammar, out of context phrases, and/or syntax issues.        Kevan Torres MD  Interventional Cardiology  [facility]               [1]   Current Outpatient Medications:     ascorbic acid, vitamin C, (VITAMIN C) 1000 MG tablet, Take 1,000 mg by mouth once daily., Disp: , Rfl:     diphenhydrAMINE (BENADRYL) 50 MG capsule, Take 50mg by mouth 1 hour before contrast administration. (Patient not taking: Reported on 3/5/2025), Disp: 1 capsule, Rfl: 0    hydroCHLOROthiazide (HYDRODIURIL) 25 MG tablet, Take 1 tablet by mouth once daily, Disp: 90 tablet, Rfl: 3    losartan (COZAAR) 25 MG tablet, Take 1 tablet by mouth once daily, Disp: 90 tablet, Rfl: 2    methocarbamoL (ROBAXIN) 500 MG Tab, Take 1 tablet (500 mg total) by mouth 4 (four) times daily as needed., Disp: 30 tablet, Rfl: 0    metoprolol succinate (TOPROL-XL) 50 MG 24 hr tablet, Take 1 tablet by mouth once daily, Disp: 90 tablet, Rfl: 3    predniSONE (DELTASONE) 50 MG Tab, Take 50mg by mouth at 13 hours, 7 hours, and 1 hour before contrast administration. (Patient not taking: Reported on 3/5/2025), Disp: 3 tablet, Rfl: 0

## 2025-03-10 ENCOUNTER — OFFICE VISIT (OUTPATIENT)
Dept: CARDIOLOGY | Facility: CLINIC | Age: 50
End: 2025-03-10
Payer: COMMERCIAL

## 2025-03-10 ENCOUNTER — PATIENT MESSAGE (OUTPATIENT)
Dept: FAMILY MEDICINE | Facility: CLINIC | Age: 50
End: 2025-03-10
Payer: COMMERCIAL

## 2025-03-10 VITALS
HEIGHT: 73 IN | WEIGHT: 273.94 LBS | OXYGEN SATURATION: 98 % | DIASTOLIC BLOOD PRESSURE: 90 MMHG | SYSTOLIC BLOOD PRESSURE: 160 MMHG | BODY MASS INDEX: 36.31 KG/M2 | HEART RATE: 54 BPM

## 2025-03-10 DIAGNOSIS — E66.01 SEVERE OBESITY WITH BODY MASS INDEX (BMI) OF 35.0 TO 39.9 WITH COMORBIDITY: ICD-10-CM

## 2025-03-10 DIAGNOSIS — I10 ESSENTIAL HYPERTENSION: ICD-10-CM

## 2025-03-10 DIAGNOSIS — Z86.79 HISTORY OF ATRIAL FLUTTER: ICD-10-CM

## 2025-03-10 DIAGNOSIS — F17.200 CURRENT EVERY DAY SMOKER: ICD-10-CM

## 2025-03-10 DIAGNOSIS — I48.92 PAROXYSMAL ATRIAL FLUTTER: Primary | ICD-10-CM

## 2025-03-10 DIAGNOSIS — R00.2 PALPITATIONS: ICD-10-CM

## 2025-03-10 LAB
OHS QRS DURATION: 82 MS
OHS QTC CALCULATION: 396 MS

## 2025-03-10 PROCEDURE — 99999 PR PBB SHADOW E&M-EST. PATIENT-LVL IV: CPT | Mod: PBBFAC,,, | Performed by: INTERNAL MEDICINE

## 2025-03-10 PROCEDURE — 93000 ELECTROCARDIOGRAM COMPLETE: CPT | Mod: S$GLB,,, | Performed by: STUDENT IN AN ORGANIZED HEALTH CARE EDUCATION/TRAINING PROGRAM

## 2025-03-11 ENCOUNTER — OFFICE VISIT (OUTPATIENT)
Dept: SLEEP MEDICINE | Facility: CLINIC | Age: 50
End: 2025-03-11
Attending: PSYCHIATRY & NEUROLOGY
Payer: COMMERCIAL

## 2025-03-11 VITALS
SYSTOLIC BLOOD PRESSURE: 151 MMHG | WEIGHT: 268.81 LBS | HEART RATE: 63 BPM | DIASTOLIC BLOOD PRESSURE: 93 MMHG | BODY MASS INDEX: 35.46 KG/M2

## 2025-03-11 DIAGNOSIS — G47.30 SLEEP APNEA, UNSPECIFIED TYPE: Primary | ICD-10-CM

## 2025-03-11 DIAGNOSIS — I48.92 PAROXYSMAL ATRIAL FLUTTER: ICD-10-CM

## 2025-03-11 DIAGNOSIS — G25.81 RLS (RESTLESS LEGS SYNDROME): ICD-10-CM

## 2025-03-11 DIAGNOSIS — G47.00 INSOMNIA, UNSPECIFIED TYPE: ICD-10-CM

## 2025-03-11 PROCEDURE — 99999 PR PBB SHADOW E&M-EST. PATIENT-LVL III: CPT | Mod: PBBFAC,,,

## 2025-03-11 RX ORDER — ACETAMINOPHEN 500 MG
5 TABLET ORAL NIGHTLY
COMMUNITY
Start: 2025-03-11

## 2025-03-11 NOTE — PATIENT INSTRUCTIONS
SLEEP LAB ([facility] or [facility]) will contact you to schedule the sleep study. Their number is [phone] (ext [phone]). Please call them if you do not hear from them in 2 weeks from now.  The [facility] is located on 7th floor of the [facility]; [facility] is located in [facility] ( 3rd floor [facility]).    SLEEP CLINIC (my assistant) will call you when the sleep study results are ready or I will message you through the portal with the results as we have discussed - if you have not heard from us by 2 weeks from the date of the study, or you can use [facility] to contact me.    Our clinic phone number is [phone] (ext 1)       You are advised to abstain from driving should you feel sleepy or drowsy.

## 2025-03-11 NOTE — PROGRESS NOTES
CC: Insomnia and Sleep Apnea     Referred by Kevan Torres MD for a sleep evaluation.     Apnea          Subjective    HPI:  Sleep Apnea:  Symptoms:    [x] Loud snoring[x] Witnessed apneas [] Gasping [] Choking[x] Interrupted Sleep [x]  Nocturia [x]  Non refreshing sleep [x] Excessive daytime sleepiness   []  Morning headaches [x] Nasal Congestion [] Dry Mouth [] Excessive Daytime Fatigue [] None  Duration:   3 [] Days  [x] Months  [] Years  Comments:    Wake up Feeling: [] Refreshed  [x] Non refreshed [] Occasionally Tired  Do You Take Naps: [] Yes [x] No Number of Naps: Would if could     Insomnia:  Symptoms:    [x] Difficulty falling asleep [] Difficulty Staying Asleep[x] Frequent nocturnal awakenings[] Early morning awakenings  [] Denies problems with sleep  Duration:   3  [] Days  [x] Months  [] Years  Frequency:  [] Occasionally [] Times per week [x] Daily [] Weekly [] Rarely  Comments:    Restless Legs Syndrome (RLS):  Symptoms:     [x] Uncomfortable leg sensations at night[x] Urge to move legs while resting/sleeping [] Nocturnal leg cramps [] None   Duration:  5  [] Days  [] Months  [x] Years   Frequency:  [] Occasionally [] Times per week [x] Daily [] Weekly [] Rarely  Comments: resolves spontaneously    The patient was screened and denies symptoms concerning: parasomnias and narcolepsy/IH    Daytime Impact:   Falling Asleep: at Work/School [x] Yes [] No    Falling Asleep While Driving: [] Yes [] No   Interfering With:    [x] Short term memory [x] Concentration  [x] Work  [x] Social life [] None        3/11/2025    10:30 AM   EPWORTH SLEEPINESS SCALE TOTAL SCORE    Total score 11     (Validated Sleepiness Questionnaire with higher score indicating greater sleepiness (0-24)        3/11/2025    10:30 AM   Sleep Clinic New Patient   What time do you go to bed on a week day? (Give a range) 9 pm   What time do you go to bed on a day off? (Give a range) 11 pm   How long does it take you to fall asleep? (Give  "a range) it varies   On average, how many times per night do you wake up? 2-3   How long does it take you to fall back into sleep? (Give a range) n/a   What time do you wake up to start your day on a week day? (Give a range) 430 am   What time do you wake up to start your day on a day off? (Give a range) 6 am   What time do you get out of bed? (Give a range) 8 am   On average, how many hours do you sleep? 6 hrs   On average, how many naps do you take per day? 0   Rate your sleep quality from 0 to 5 (0-poor, 5-great). 2   Have you experienced:  Weight loss?   How much weight have you lost or gained (in lbs.) in the last year? 40lbs   On average, how many times per night do you go to the bathroom?  2                   STOP BANG Questionnaire  Patient diagnosed with Obstructive Sleep Apnea?: No  Has loud snoring: Yes  Disturbed sleep, daytime fatigue, daytime somnolence: Yes  Observed to have interrupted breathing during sleep: No  Takes medication for high blood pressure: Yes  Not taking BP medication but supposed to be: No  Recent BMI (Calculated): 36.1  Is BMI greater than 35 kg/m2?: 1=Yes  Age older than 50 years old?: 1=Yes  Has large neck size >40cm (15.7in., large male shirt size, large male collar size >16): No  Gender - Male: 1=Yes  STOP-Bang Total Score: 6  (Validated Stop Bang Questionnaire with higher score indicating greater risk of RADHA (0-2, 3-4, 5-8)) Risk: High          Objective    Records Reviewed  Lab Results   Component Value Date    CO2 25 02/27/2025     No echocardiogram results found for the past 12 months     Sleep Disorder Family History: None    Objective:  Vitals: Blood pressure (!) 151/93, pulse 63, weight 121.9 kg (268 lb 12.8 oz).   Exam:  Mallampati Score: III (soft and hard palate and base of uvula visible)  Neck: Size: 16.5"  Gen: Well Appearing, demonstrates insight  Skin: No rash or lesions on bridge of nose or mouth          Assessment & Plan  Paroxysmal atrial flutter  Follow up " with cardiology for continued mangement  Continue taking medications metroprolol xr 50 mg  Orders:    Ambulatory referral/consult to Sleep Disorders    Sleep apnea, unspecified type  Diagnostic Testing: Home Sleep Apnea Test (HST) indicated due to comorbid conditions: Aflutter, Hypertension, and Obesity with symptoms snoring, excessive daytime sleepiness, ESS 11/24, Stop Bang High Risk, interrupted sleep, and nonrefreshing sleep  Education:  Discussed the etiology and consequences of untreated RADHA, including risks of cardiovascular disease, hypertension, stroke, metabolic dysfunction, and excessive daytime sleepiness.  Safety Precautions: Recommended avoiding driving if experiencing excessive daytime sleepiness.  Treatment options:  Positive Airway Pressure (PAP) therapy, Weight loss Positional therapy,  Lifestyle Modifications: Advised weight management, alcohol reduction, and optimizing sleep hygiene.    Next Steps:  If RADHA is confirmed, patient agrees to trial APAP  If PAP therapy is initiated, follow-up within 31-90 days to assess compliance and effectiveness.  Results will be communicated by [facility]   Orders:    Home Sleep Study; Future    Insomnia, unspecified type  Discussed Sleep Hygiene and Stimulus Control  Melatonin 10 mg nightly prn for insomnia  Consider trial of trazodone (has some anxiety regarding sleep)       RLS (restless legs syndrome)  HST to rule out radha  Will revaluate after study  Consider iron panel

## 2025-03-11 NOTE — ASSESSMENT & PLAN NOTE
Follow up with cardiology for continued mangement  Continue taking medications metroprolol xr 50 mg  Orders:    Ambulatory referral/consult to Sleep Disorders

## 2025-03-12 ENCOUNTER — OFFICE VISIT (OUTPATIENT)
Dept: FAMILY MEDICINE | Facility: CLINIC | Age: 50
End: 2025-03-12
Payer: COMMERCIAL

## 2025-03-12 ENCOUNTER — HOSPITAL ENCOUNTER (OUTPATIENT)
Dept: RADIOLOGY | Facility: HOSPITAL | Age: 50
Discharge: HOME | End: 2025-03-12
Payer: COMMERCIAL

## 2025-03-12 ENCOUNTER — LAB VISIT (OUTPATIENT)
Dept: LAB | Facility: HOSPITAL | Age: 50
End: 2025-03-12
Payer: COMMERCIAL

## 2025-03-12 VITALS
HEART RATE: 65 BPM | WEIGHT: 263 LBS | SYSTOLIC BLOOD PRESSURE: 122 MMHG | BODY MASS INDEX: 34.85 KG/M2 | HEIGHT: 73 IN | OXYGEN SATURATION: 98 % | DIASTOLIC BLOOD PRESSURE: 82 MMHG

## 2025-03-12 DIAGNOSIS — R10.13 EPIGASTRIC PAIN: ICD-10-CM

## 2025-03-12 DIAGNOSIS — R10.13 EPIGASTRIC PAIN: Primary | ICD-10-CM

## 2025-03-12 LAB
ALBUMIN SERPL BCP-MCNC: 3.9 G/DL (ref 3.5–5.2)
ALBUMIN SERPL BCP-MCNC: 3.9 G/DL (ref 3.5–5.2)
ALP SERPL-CCNC: 46 U/L (ref 40–150)
ALP SERPL-CCNC: 46 U/L (ref 40–150)
ALT SERPL W/O P-5'-P-CCNC: 21 U/L (ref 10–44)
ALT SERPL W/O P-5'-P-CCNC: 21 U/L (ref 10–44)
AMYLASE SERPL-CCNC: 58 U/L (ref 20–110)
ANION GAP SERPL CALC-SCNC: 9 MMOL/L (ref 8–16)
AST SERPL-CCNC: 28 U/L (ref 10–40)
AST SERPL-CCNC: 28 U/L (ref 10–40)
BASOPHILS # BLD AUTO: 0.02 K/UL (ref 0–0.2)
BASOPHILS NFR BLD: 0.4 % (ref 0–1.9)
BILIRUB DIRECT SERPL-MCNC: 0.3 MG/DL (ref 0.1–0.3)
BILIRUB SERPL-MCNC: 0.6 MG/DL (ref 0.1–1)
BILIRUB SERPL-MCNC: 0.6 MG/DL (ref 0.1–1)
BUN SERPL-MCNC: 9 MG/DL (ref 6–20)
CALCIUM SERPL-MCNC: 9.4 MG/DL (ref 8.7–10.5)
CHLORIDE SERPL-SCNC: 103 MMOL/L (ref 95–110)
CO2 SERPL-SCNC: 28 MMOL/L (ref 23–29)
CREAT SERPL-MCNC: 1 MG/DL (ref 0.5–1.4)
DIFFERENTIAL METHOD BLD: ABNORMAL
EOSINOPHIL # BLD AUTO: 0.1 K/UL (ref 0–0.5)
EOSINOPHIL NFR BLD: 1.7 % (ref 0–8)
ERYTHROCYTE [DISTWIDTH] IN BLOOD BY AUTOMATED COUNT: 13.1 % (ref 11.5–14.5)
EST. GFR  (NO RACE VARIABLE): >60 ML/MIN/1.73 M^2
GLUCOSE SERPL-MCNC: 86 MG/DL (ref 70–110)
HCT VFR BLD AUTO: 44.2 % (ref 40–54)
HGB BLD-MCNC: 14.5 G/DL (ref 14–18)
IMM GRANULOCYTES # BLD AUTO: 0.01 K/UL (ref 0–0.04)
IMM GRANULOCYTES NFR BLD AUTO: 0.2 % (ref 0–0.5)
LIPASE SERPL-CCNC: 17 U/L (ref 4–60)
LYMPHOCYTES # BLD AUTO: 2 K/UL (ref 1–4.8)
LYMPHOCYTES NFR BLD: 42.7 % (ref 18–48)
MCH RBC QN AUTO: 33 PG (ref 27–31)
MCHC RBC AUTO-ENTMCNC: 32.8 G/DL (ref 32–36)
MCV RBC AUTO: 101 FL (ref 82–98)
MONOCYTES # BLD AUTO: 0.5 K/UL (ref 0.3–1)
MONOCYTES NFR BLD: 10.8 % (ref 4–15)
NEUTROPHILS # BLD AUTO: 2.1 K/UL (ref 1.8–7.7)
NEUTROPHILS NFR BLD: 44.2 % (ref 38–73)
NRBC BLD-RTO: 0 /100 WBC
PLATELET # BLD AUTO: 286 K/UL (ref 150–450)
PMV BLD AUTO: 10.5 FL (ref 9.2–12.9)
POTASSIUM SERPL-SCNC: 4.5 MMOL/L (ref 3.5–5.1)
PROT SERPL-MCNC: 7.2 G/DL (ref 6–8.4)
PROT SERPL-MCNC: 7.2 G/DL (ref 6–8.4)
RBC # BLD AUTO: 4.4 M/UL (ref 4.6–6.2)
SODIUM SERPL-SCNC: 140 MMOL/L (ref 136–145)
WBC # BLD AUTO: 4.73 K/UL (ref 3.9–12.7)

## 2025-03-12 PROCEDURE — 36415 COLL VENOUS BLD VENIPUNCTURE: CPT | Mod: PO

## 2025-03-12 PROCEDURE — 82150 ASSAY OF AMYLASE: CPT

## 2025-03-12 PROCEDURE — 85025 COMPLETE CBC W/AUTO DIFF WBC: CPT

## 2025-03-12 PROCEDURE — 80053 COMPREHEN METABOLIC PANEL: CPT

## 2025-03-12 PROCEDURE — 99999 PR PBB SHADOW E&M-EST. PATIENT-LVL IV: CPT | Mod: PBBFAC,,,

## 2025-03-12 PROCEDURE — 83690 ASSAY OF LIPASE: CPT

## 2025-03-12 PROCEDURE — 74176 CT ABD & PELVIS W/O CONTRAST: CPT | Mod: TC

## 2025-03-12 PROCEDURE — 74176 CT ABD & PELVIS W/O CONTRAST: CPT | Mod: 26,,, | Performed by: STUDENT IN AN ORGANIZED HEALTH CARE EDUCATION/TRAINING PROGRAM

## 2025-03-12 NOTE — PROGRESS NOTES
[facility]    3/12/2025      Subjective:       Patient ID:  Shane is a 49 y.o. male .  has a past medical history of Arthritis and Hypertension.    History of Present Illness    CHIEF COMPLAINT:  Shane presents today for abdominal pain and decreased appetite    HISTORY OF PRESENT ILLNESS:  He reports upper abdominal pain with additional discomfort around the navel area. He experiences poor appetite with minimal food intake throughout the day and feels full most of the time. He reports nausea without vomiting, describing an inability to vomit despite the urge. He has had alternating constipation and diarrhea, with a recent episode of prolonged diarrhea. He has a history of H. pylori diagnosed a few years ago. Hx of gastric bypass in 2001. Endorses unexpected weight loss. Lost ~ 10lbs in the last week. Has never had colonoscopy but negative cologuard.     MEDICAL HISTORY:  He has a history of hypertension.    WEIGHT MANAGEMENT:  He reports significant weight loss from a previous weight of 500 lbs. His goal weight is 285 lbs, which was his weight in high school.    MEDICATIONS:  He takes hydrochlorothiazide, losartan, and metoprolol for blood pressure management.    SOCIAL HISTORY:  He is a current smoker.    ALLERGIES:  He has an allergy to iodine.      ROS:  Constitutional: -chills, -fever  Respiratory: -cough, -shortness of breath  Cardiovascular: -chest pain  Gastrointestinal: +abdominal pain, +constipation, +diarrhea, +nausea, -vomiting, +loss of appetite  Neurological: +dizziness, -lightheadedness, -headaches           Problem List[1]      Last HgbA1C:    Lab Results   Component Value Date    HGBA1C 5.4 09/09/2024    HGBA1C 5.2 08/02/2023    HGBA1C 5.6 12/06/2021         Last Lipid Panel:    Lab Results   Component Value Date    HDL 48 09/09/2024    HDL 51 01/24/2023    HDL 54 04/19/2022       Lab Results   Component Value Date    LDLCALC 45.2 (L) 09/09/2024    LDLCALC 58.2  "(L) 01/24/2023    LDLCALC 72.4 04/19/2022       Lab Results   Component Value Date    TRIG 59 09/09/2024    TRIG 99 01/24/2023    TRIG 133 04/19/2022       Lab Results   Component Value Date    CHOLHDL 45.7 09/09/2024    CHOLHDL 39.5 01/24/2023    CHOLHDL 35.3 04/19/2022         Review of patient's allergies indicates:   Allergen Reactions    Iodine and iodide containing products      States is no longer an allergy        Medication List with Changes/Refills   Current Medications    ASCORBIC ACID, VITAMIN C, (VITAMIN C) 1000 MG TABLET    Take 1,000 mg by mouth once daily.    HYDROCHLOROTHIAZIDE (HYDRODIURIL) 25 MG TABLET    Take 1 tablet by mouth once daily    LOSARTAN (COZAAR) 25 MG TABLET    Take 1 tablet by mouth once daily    MELATONIN (MELATIN) 5 MG    Take 1 tablet (5 mg total) by mouth nightly.    METOPROLOL SUCCINATE (TOPROL-XL) 50 MG 24 HR TABLET    Take 1 tablet by mouth once daily    PREDNISONE (DELTASONE) 50 MG TAB    Take 50mg by mouth at 13 hours, 7 hours, and 1 hour before contrast administration.               Objective:      /82 (BP Location: Left arm, Patient Position: Sitting)   Pulse 65   Ht 6' 1" (1.854 m)   Wt 119.3 kg (263 lb 0.1 oz)   SpO2 98%   BMI 34.70 kg/m²   Estimated body mass index is 34.7 kg/m² as calculated from the following:    Height as of this encounter: 6' 1" (1.854 m).    Weight as of this encounter: 119.3 kg (263 lb 0.1 oz).    Physical Exam  Vitals reviewed.   Constitutional:       General: He is not in acute distress.     Appearance: He is obese.   HENT:      Head: Normocephalic and atraumatic.      Mouth/Throat:      Mouth: Mucous membranes are moist.   Eyes:      Conjunctiva/sclera: Conjunctivae normal.   Cardiovascular:      Rate and Rhythm: Normal rate.   Pulmonary:      Effort: Pulmonary effort is normal. No respiratory distress.   Abdominal:      General: Bowel sounds are normal. There is no distension.      Palpations: Abdomen is soft.      Tenderness: " There is abdominal tenderness (generalized abdominal tenderness).   Musculoskeletal:         General: Normal range of motion.      Cervical back: Normal range of motion.   Skin:     General: Skin is warm.   Neurological:      General: No focal deficit present.      Mental Status: He is alert and oriented to person, place, and time.   Psychiatric:         Mood and Affect: Mood normal.         Behavior: Behavior normal.             Assessment and Plan:   1. Epigastric pain  - Amylase; Future  - Comprehensive metabolic panel; Future  - CBC W/ AUTO DIFFERENTIAL; Future  - Hepatic function panel; Future  - Lipase; Future  - CT Abdomen Pelvis  Without Contrast; Future  - H. pylori antigen, stool; Future  - Ambulatory referral/consult to Gastroenterology; Future     Assessment & Plan    IMPRESSION:  - Considering gastroparesis as potential cause for symptoms, given history of gastric surgery and current presentation  - Evaluating for recurrence of H. pylori  - Investigating rapid weight loss (12 lbs in 6 days)  - Assessing for other GI pathologies, given persistent symptoms and lack of definitive diagnosis    EPIGASTRIC PAIN AND H. PYLORI:  - Acknowledged the patient's history of H. pylori and current symptoms.  - Considered possibility of recurrent H. pylori.  - Ordered stool studies for H. pylori and other potential pathogens.    FUNCTIONAL INTESTINAL DISORDERS:  - Shane reports experiencing both constipation and diarrhea.  - Ordered stool studies.    HYPERTENSION:  - Shane is taking hydrochlorothiazide, losartan, and metoprolol for blood pressure management.    ABNORMAL WEIGHT LOSS:  - Noted significant weight loss over a short period, from 275 lbs to 263 lbs.  - Expressed concern about rapid weight loss.  - Ordered lab work to investigate weight loss.    GENERAL DIAGNOSTIC PROCEDURES:  - Ordered CT WO Contrast.  - Ordered lab work including electrolytes.      REFERRALS:  - Referred the patient to Gastroenterology for  further evaluation of persistent GI symptoms.    FOLLOW-UP:  - Follow up in approximately 1 month to review test results and specialist findings.         The patient was informed of the following statements     Emergency Care:Seek immediate medical attention in the emergency room if you experience any new or worsening symptoms, or if your current condition significantly changes or becomes more severe.  Patient Acknowledgment: Patient verbalizes understanding of the plan and agrees to proceed with the recommended care.    Follow Up:  4/7/2025 Nuno White MD                    Other Orders Placed This Visit:  Orders Placed This Encounter   Procedures    CT Abdomen Pelvis  Without Contrast    Amylase    Comprehensive metabolic panel    CBC W/ AUTO DIFFERENTIAL    Hepatic function panel    Lipase    H. pylori antigen, stool    Ambulatory referral/consult to Gastroenterology         This note was generated with the assistance of ambient listening technology. Verbal consent was obtained by the patient and accompanying visitor(s) for the recording of patient appointment to facilitate this note. I attest to having reviewed and edited the generated note for accuracy, though some syntax or spelling errors may persist. Please contact the author of this note for any clarification.        Jessica Gallegos PA-C             [1]   Patient Active Problem List  Diagnosis    Severe obesity with body mass index (BMI) of 35.0 to 39.9 with comorbidity    Lumbar facet arthropathy    Patellofemoral syndrome    Marijuana user    Decreased range of motion (ROM) of right knee    Decreased strength    Decreased mobility    Essential hypertension    Fatigue    Plantar fasciitis    Chronic lower back pain    Wears contact lenses    Current every day smoker    Paroxysmal atrial flutter

## 2025-03-13 ENCOUNTER — PATIENT MESSAGE (OUTPATIENT)
Dept: FAMILY MEDICINE | Facility: CLINIC | Age: 50
End: 2025-03-13
Payer: COMMERCIAL

## 2025-03-13 ENCOUNTER — RESULTS FOLLOW-UP (OUTPATIENT)
Dept: FAMILY MEDICINE | Facility: CLINIC | Age: 50
End: 2025-03-13

## 2025-03-14 ENCOUNTER — LAB VISIT (OUTPATIENT)
Dept: LAB | Facility: HOSPITAL | Age: 50
End: 2025-03-14
Payer: COMMERCIAL

## 2025-03-14 DIAGNOSIS — R10.13 EPIGASTRIC PAIN: ICD-10-CM

## 2025-03-14 PROCEDURE — 87338 HPYLORI STOOL AG IA: CPT

## 2025-03-17 LAB — H.PYLORI ANTIGEN INTERPRETATION: NEGATIVE

## 2025-03-21 ENCOUNTER — OFFICE VISIT (OUTPATIENT)
Dept: GASTROENTEROLOGY | Facility: CLINIC | Age: 50
End: 2025-03-21
Payer: COMMERCIAL

## 2025-03-21 VITALS — WEIGHT: 273.81 LBS | HEIGHT: 76 IN | BODY MASS INDEX: 33.34 KG/M2

## 2025-03-21 DIAGNOSIS — R10.13 EPIGASTRIC PAIN: ICD-10-CM

## 2025-03-21 PROCEDURE — 99999 PR PBB SHADOW E&M-EST. PATIENT-LVL III: CPT | Mod: PBBFAC,,, | Performed by: INTERNAL MEDICINE

## 2025-03-21 NOTE — PROGRESS NOTES
Subjective:       Patient ID: Shane Savage is a 49 y.o. male.    Chief Complaint: Abdominal Pain    Patient here today to establish care with aforementioned complaints     Stomach issues. H/o H pylori in the past, seemed like that. Pain, diffuse. Associated diarrhea. Affected diet, could only ate a little. Lasted for about 2 weeks, ultimately went away.     CT normal    Hp negative, stool studies negative    Gastric bipass 2001    Cologuard negative in 2023    Past Medical History:   Diagnosis Date    Arthritis     Hypertension        Past Surgical History:   Procedure Laterality Date    gastic bypass         Social History  Social History[1]    Family History   Problem Relation Name Age of Onset    Hypertension Mother      Diabetes Mother      Hypertension Father         Review of Systems   Gastrointestinal:  Negative for abdominal distention, abdominal pain, diarrhea, nausea and vomiting.           Objective:      Physical Exam  Constitutional:       Appearance: He is well-developed.   HENT:      Head: Normocephalic and atraumatic.   Eyes:      Conjunctiva/sclera: Conjunctivae normal.   Pulmonary:      Effort: Pulmonary effort is normal. No respiratory distress.   Neurological:      Mental Status: He is alert and oriented to person, place, and time.   Psychiatric:         Behavior: Behavior normal.         Thought Content: Thought content normal.         Judgment: Judgment normal.           Pertinent labs and imaging studies reviewed    Assessment:       1. Epigastric pain        Plan:       Now resolved.  Likely viral  Patient average risk for colon cancer.  Up-to-date with appropriate screening.  Repeat Cologuard were similar modality in 2026   Follow-up as needed    (Portions of this note were dictated using voice recognition software and may contain dictation related errors in spelling/grammar/syntax not found on text review)             [1]   Social History  Tobacco Use    Smoking status: Some Days      Current packs/day: 0.00     Average packs/day: 0.1 packs/day for 9.0 years (0.9 ttl pk-yrs)     Types: Cigarettes     Start date: 12/2012     Last attempt to quit: 12/2021     Years since quitting: 3.3    Smokeless tobacco: Former    Tobacco comments:     Stopped smoking 3 weeks ago (today 1/3/22), using nicotine vape throughout the day     Marijuana user   Substance Use Topics    Alcohol use: Yes    Drug use: Yes     Types: Marijuana

## 2025-03-24 ENCOUNTER — TELEPHONE (OUTPATIENT)
Dept: SLEEP MEDICINE | Facility: OTHER | Age: 50
End: 2025-03-24
Payer: COMMERCIAL

## 2025-03-24 NOTE — PROGRESS NOTES
CHW - Initial Contact    This Community Health Worker completed verified updated the Social Determinant of Health questionnaire with patient via telephone today.    Pt identified barriers of most importance are: Food, Utility Assistance    Referrals to community agencies completed with patient/caregiver consent outside of [facility] include:  [facility]/ Utility  Support and Services: CHW provided pt with resources via In Basket   Other information discussed the patient needs / wants help with: Pt stated that he does not qualify for SNAP food program. Pt requested free resources   Follow up required: Yes, confirm  Follow-up Outreach - Due: 4/7/2025

## 2025-03-24 NOTE — TELEPHONE ENCOUNTER
Patient canceled the home sleep study for today,he cannot afford it right now.  He will call back to reschedule.

## 2025-03-25 ENCOUNTER — PATIENT OUTREACH (OUTPATIENT)
Dept: ADMINISTRATIVE | Facility: OTHER | Age: 50
End: 2025-03-25
Payer: COMMERCIAL

## 2025-03-27 ENCOUNTER — CLINICAL SUPPORT (OUTPATIENT)
Dept: SMOKING CESSATION | Facility: CLINIC | Age: 50
End: 2025-03-27
Payer: COMMERCIAL

## 2025-03-27 DIAGNOSIS — F17.200 NICOTINE DEPENDENCE: Primary | ICD-10-CM

## 2025-03-27 PROCEDURE — 99404 PREV MED CNSL INDIV APPRX 60: CPT | Mod: S$GLB,,, | Performed by: GENERAL PRACTICE

## 2025-03-27 PROCEDURE — 99999 PR PBB SHADOW E&M-EST. PATIENT-LVL I: CPT | Mod: PBBFAC,,,

## 2025-03-27 RX ORDER — IBUPROFEN 200 MG
1 TABLET ORAL DAILY
Qty: 28 PATCH | Refills: 0 | Status: SHIPPED | OUTPATIENT
Start: 2025-03-27

## 2025-03-27 RX ORDER — MICONAZOLE NITRATE 2 %
2 CREAM (GRAM) TOPICAL
Qty: 100 EACH | Refills: 0 | Status: SHIPPED | OUTPATIENT
Start: 2025-03-27

## 2025-04-10 ENCOUNTER — CLINICAL SUPPORT (OUTPATIENT)
Dept: SMOKING CESSATION | Facility: CLINIC | Age: 50
End: 2025-04-10
Payer: COMMERCIAL

## 2025-04-10 ENCOUNTER — TELEPHONE (OUTPATIENT)
Dept: FAMILY MEDICINE | Facility: CLINIC | Age: 50
End: 2025-04-10
Payer: COMMERCIAL

## 2025-04-10 DIAGNOSIS — F17.200 NICOTINE DEPENDENCE: Primary | ICD-10-CM

## 2025-04-10 PROCEDURE — 99407 BEHAV CHNG SMOKING > 10 MIN: CPT | Mod: S$GLB,,, | Performed by: GENERAL PRACTICE

## 2025-04-10 PROCEDURE — 99999 PR PBB SHADOW E&M-EST. PATIENT-LVL I: CPT | Mod: PBBFAC,,,

## 2025-04-10 NOTE — TELEPHONE ENCOUNTER
Called pt infroming him I was contacting him to set up his follow up appt that he missed. Pt rescheduled appt 4/14 with DANII Smallwood

## 2025-04-14 ENCOUNTER — OFFICE VISIT (OUTPATIENT)
Dept: FAMILY MEDICINE | Facility: CLINIC | Age: 50
End: 2025-04-14
Payer: COMMERCIAL

## 2025-04-14 VITALS
SYSTOLIC BLOOD PRESSURE: 126 MMHG | HEIGHT: 76 IN | WEIGHT: 276 LBS | DIASTOLIC BLOOD PRESSURE: 80 MMHG | OXYGEN SATURATION: 97 % | BODY MASS INDEX: 33.61 KG/M2 | HEART RATE: 66 BPM

## 2025-04-14 DIAGNOSIS — R00.2 PALPITATIONS: ICD-10-CM

## 2025-04-14 DIAGNOSIS — Z23 NEED FOR SHINGLES VACCINE: ICD-10-CM

## 2025-04-14 DIAGNOSIS — M67.441 GANGLION CYST OF JOINT OF FINGER OF RIGHT HAND: ICD-10-CM

## 2025-04-14 DIAGNOSIS — Z87.891 PERSONAL HISTORY OF TOBACCO USE: ICD-10-CM

## 2025-04-14 DIAGNOSIS — E66.811 OBESITY (BMI 30.0-34.9): ICD-10-CM

## 2025-04-14 DIAGNOSIS — Z86.79 HISTORY OF ATRIAL FLUTTER: ICD-10-CM

## 2025-04-14 DIAGNOSIS — I10 ESSENTIAL HYPERTENSION: Primary | ICD-10-CM

## 2025-04-14 PROCEDURE — 99214 OFFICE O/P EST MOD 30 MIN: CPT | Mod: 25,S$GLB,, | Performed by: NURSE PRACTITIONER

## 2025-04-14 PROCEDURE — 3079F DIAST BP 80-89 MM HG: CPT | Mod: CPTII,S$GLB,, | Performed by: NURSE PRACTITIONER

## 2025-04-14 PROCEDURE — 3008F BODY MASS INDEX DOCD: CPT | Mod: CPTII,S$GLB,, | Performed by: NURSE PRACTITIONER

## 2025-04-14 PROCEDURE — 90750 HZV VACC RECOMBINANT IM: CPT | Mod: S$GLB,,, | Performed by: NURSE PRACTITIONER

## 2025-04-14 PROCEDURE — 99999 PR PBB SHADOW E&M-EST. PATIENT-LVL IV: CPT | Mod: PBBFAC,,, | Performed by: NURSE PRACTITIONER

## 2025-04-14 PROCEDURE — 1159F MED LIST DOCD IN RCRD: CPT | Mod: CPTII,S$GLB,, | Performed by: NURSE PRACTITIONER

## 2025-04-14 PROCEDURE — 4010F ACE/ARB THERAPY RXD/TAKEN: CPT | Mod: CPTII,S$GLB,, | Performed by: NURSE PRACTITIONER

## 2025-04-14 PROCEDURE — 90471 IMMUNIZATION ADMIN: CPT | Mod: S$GLB,,, | Performed by: NURSE PRACTITIONER

## 2025-04-14 PROCEDURE — 1160F RVW MEDS BY RX/DR IN RCRD: CPT | Mod: CPTII,S$GLB,, | Performed by: NURSE PRACTITIONER

## 2025-04-14 PROCEDURE — 3074F SYST BP LT 130 MM HG: CPT | Mod: CPTII,S$GLB,, | Performed by: NURSE PRACTITIONER

## 2025-04-14 PROCEDURE — G2211 COMPLEX E/M VISIT ADD ON: HCPCS | Mod: S$GLB,,, | Performed by: NURSE PRACTITIONER

## 2025-04-14 RX ORDER — ZOSTER VACCINE RECOMBINANT, ADJUVANTED 50 MCG/0.5
0.5 KIT INTRAMUSCULAR ONCE
Qty: 1 EACH | Refills: 0 | Status: SHIPPED | OUTPATIENT
Start: 2025-04-14 | End: 2025-04-14 | Stop reason: SDUPTHER

## 2025-04-14 NOTE — PROGRESS NOTES
Subjective     Patient ID: Shane Savage is a 49 y.o. male.    Chief Complaint: Follow-up    History of Present Illness    HPI:  Mr. Savage is known to me and presents for a routine follow-up visit of his chronic hypertension and to inquire about a recently noticed lump on his thumb. Mr. Savage reports noticing a lump on his right thumb approximately 1.5 to 2 weeks ago. He describes it as a mass located near the joint. He reports mild pain associated with it, but denies any erythema, edema, or increased temperature. The lump is not affecting the function of his hand or thumb.    Mr. Savage mentions recent weight fluctuations. He lost weight due to GI issues recently, reaching a low of 263 lbs, but has since regained about 10 lbs and currently weighs 276 lbs. His previous GI issues have resolved, and he feels improved now.    Mr. Savage has quit smoking cigarettes since the previous Friday, using nicotine patches and gum to manage cravings. He has not purchased cigarettes since the previous Wednesday and has successfully avoided smoking even when around other smokers.    Regarding his cardiovascular health, he has not had palpitations in a few months; he is taking metoprolol 50mg daily for this.    Mr. Savage walks about 10,000 steps a day at his job. He recently consumed boiled crawfish, shrimp, and turkey for breakfast.    He denies any edema to ankles or feet, chest pain, dyspnea, headache, dizziness, or vision changes. He also denies recent paresthesia. Last eye exam was last year.    MEDICATIONS:  Mr. Savage is on Hydrochlorothiazide 25 mg and Losartan 25 mg for blood pressure management. He is also taking Metoprolol 50 mg for blood pressure and palpitations. His regimen includes Vitamin C 1000 mg daily. For smoking cessation, he uses both nicotine patches and gum. Mr. Savage discontinued melatonin and prednisone, which he never took.    MEDICAL HISTORY:  Mr. Savage has a history of hypertension, atrial  fibrillation, and mild anemia. He received the first dose of the shingles vaccine in August of last year.    TEST RESULTS:  Labs reviewed with patient today. Last month, the patient's CBC showed mild anemia, which was not concerning. His kidney and liver function tests, as well as pancreas enzymes, were within normal limits. A stool test was negative for bacteria. In September, his cholesterol, A1c, and thyroid function tests were all within normal limits. Mr. Savage has a Holter monitor test scheduled for the 21st of this month (next Monday). A stress test is planned for June 5th. He follows up with Cardiology.    SOCIAL HISTORY:  Alcohol: Drinks a couple days a week  Marijuana: Current user Occupation: Employed, walks about 10,000 steps a day at job    Marital status: Single parent            Problem List[1]    Review of Systems  Otherwise negative       Objective     Physical Exam  Vitals reviewed.   Constitutional:       General: He is awake. He is not in acute distress.     Appearance: Normal appearance. He is well-developed and well-groomed. He is obese.   HENT:      Head: Normocephalic and atraumatic.      Right Ear: Tympanic membrane, ear canal and external ear normal.      Left Ear: Tympanic membrane, ear canal and external ear normal.   Eyes:      General:         Right eye: No discharge.         Left eye: No discharge.      Extraocular Movements: Extraocular movements intact.      Pupils: Pupils are equal, round, and reactive to light.   Neck:      Vascular: No carotid bruit.   Cardiovascular:      Rate and Rhythm: Normal rate and regular rhythm.      Pulses:           Radial pulses are 2+ on the right side and 2+ on the left side.        Dorsalis pedis pulses are 2+ on the right side and 2+ on the left side.      Heart sounds: Normal heart sounds, S1 normal and S2 normal. No murmur heard.  Pulmonary:      Effort: Pulmonary effort is normal. No respiratory distress.      Breath sounds: No wheezing or  rhonchi.   Musculoskeletal:      Right lower leg: No edema.      Left lower leg: No edema.      Comments: Freely movable cyst near MCP joint, feels round in shape, slightly firm, nontender, approx. 0.5 cm in diameter; no erythema or warmth to skin   Lymphadenopathy:      Cervical: No cervical adenopathy.   Skin:     General: Skin is warm and dry.      Coloration: Skin is not pale.   Neurological:      Mental Status: He is alert and oriented to person, place, and time.      Coordination: Coordination normal.      Gait: Gait normal.   Psychiatric:         Attention and Perception: Attention normal.         Mood and Affect: Mood and affect normal.         Speech: Speech normal.         Behavior: Behavior normal. Behavior is cooperative.         Thought Content: Thought content normal.         Cognition and Memory: Cognition normal.            Assessment and Plan     1. Essential hypertension  -     CBC Auto Differential; Future; Expected date: 10/14/2025  -     Comprehensive Metabolic Panel; Future; Expected date: 10/14/2025  -     Lipid Panel; Future; Expected date: 10/14/2025  -     TSH; Future; Expected date: 10/14/2025  -     Urinalysis; Future; Expected date: 10/14/2025    2. Palpitations    3. History of atrial flutter    4. Ganglion cyst of joint of finger of right hand    5. Personal history of tobacco use    6. Need for shingles vaccine  -     varicella zoster (Shingrix) IM vaccine (>/= 49 yo)    7. Obesity (BMI 30.0-34.9)  -     Lipid Panel; Future; Expected date: 10/14/2025               Assessment & Plan    ESSENTIAL HYPERTENSION:  - Monitored the patient's blood pressure, which is well-controlled at 126/80.  - Continued current blood pressure medications: hydrochlorothiazide 25mg, Losartan 25mg, and Metoprolol 50mg.  - Educated the patient on the relationship between weight loss and potential reduction in blood pressure medications.  - Suggested potential for reducing hydrochlorothiazide to 12.5mg with  weight loss.  - Recommend lifestyle modifications including salt reduction, increased water intake, and regular exercise.  - Advised the patient to report any swelling in ankles or feet, chest pain, shortness of breath, headache, dizziness, or vision changes.  - Mr. Savage to reduce salt intake, especially from boiled seafood.  - Mr. Savage to increase water intake.  - Recommend incorporating more purposeful exercise beyond work-related activity for cardiovascular health and weight management.  - Mr. Savage to continue eating fruits, vegetables, and lean proteins (chicken, fish).    ATRIAL FIBRILLATION:  - Continued Metoprolol 50mg for atrial fibrillation management.  - Noted that the patient reports no palpitations in the last couple of months.  - Reviewed upcoming cardiology appointments and tests for atrial fibrillation management.  - Scheduled Holter monitor test, stress echo, and cardiology follow-up.    ANEMIA:  - Noted slight anemia in recent CBC, not of significant concern.  - Decreased Vitamin C from 1000 mg to 500 mg daily.  - Ordered follow-up labs in 6 months.    GANGLION CYST (RIGHT HAND):  - Evaluated the patient's right thumb, noting a movable cyst-like structure without redness, swelling, or warmth.  - Assessed the cyst as likely a synovial joint cyst.  - Explained the nature of joint cysts, potential causes, and when further evaluation may be necessary.  - Recommend monitoring for changes in size or symptoms.  - Advised that soft tissue ultrasound may be considered if the cyst grows or becomes more problematic.    NICOTINE DEPENDENCE (IN REMISSION):  - Noted that the patient reports not smoking since the previous Friday and not buying cigarettes since the previous Wednesday.  - Continued use of nicotine replacement therapy (patch and gum).  - Encouraged continued abstinence from smoking.  - Scheduled smoking cessation appointment for the following Thursday.    CANNABIS USE:  - Confirmed ongoing  cannabis use by the patient.  - Inquired about cannabis use in relation to stress relief; advised against this.    ALCOHOL USE:  - Noted that the patient reports drinking alcohol a few days a week.  - Inquired about alcohol use in relation to stress relief.  - Advised moderation in alcohol consumption.  - Instructed the patient to avoid using alcohol as a stress relief method.    OBSTRUCTIVE SLEEP APNEA:  - Discussed previous referral to sleep medicine and inquired about sleep study.  - Noted that the patient did not complete the study due to billing issues.  - Explained the impact of sleep apnea on heart health and blood pressure.  - Recommend weight loss as a pot  ential treatment for sleep apnea.  - Educated on the benefits of weight loss, including potential resolution of sleep apnea and reflux.    PREVENTIVE CARE:  - Informed about increased risk of shingles in older adults and importance of vaccination.  - Explained potential side effects of second shingles vaccine dose, including body aches, chills, and mild fever.  - Administered second dose of shingles vaccine.    FOLLOW-UP:  - Ordered comprehensive lab work for 6-month follow-up.  - Follow up in 6 months for annual visit and lab work.  - Contact office if medication refills are needed.  - Use patient portal to send messages if needed.          Follow up in about 6 months (around 10/14/2025), or if symptoms worsen or fail to improve.        I spent a total of 30 minutes on the day of the visit. This includes face to face time and non-face to face time preparing to see the patient (eg, review of tests), obtaining and/or reviewing separately obtained history, documenting clinical information in the electronic or other health record, independently interpreting results and communicating results to the patient/family/caregiver, or care coordinator.        (Portions of this note may have been dictated using voice recognition software and may contain dictation  related errors in spelling/grammar/syntax not found on text review)     This note was generated with the assistance of ambient listening technology. Verbal consent was obtained by the patient and accompanying visitor(s) for the recording of patient appointment to facilitate this note. I attest to having reviewed and edited the generated note for accuracy, though some syntax or spelling errors may persist. Please contact the author of this note for any clarification.              [1]   Patient Active Problem List  Diagnosis    Severe obesity with body mass index (BMI) of 35.0 to 39.9 with comorbidity    Lumbar facet arthropathy    Patellofemoral syndrome    Marijuana user    Decreased range of motion (ROM) of right knee    Decreased strength    Decreased mobility    Essential hypertension    Fatigue    Plantar fasciitis    Chronic lower back pain    Wears contact lenses    Current every day smoker    Paroxysmal atrial flutter    Personal history of tobacco use

## 2025-04-15 ENCOUNTER — PATIENT OUTREACH (OUTPATIENT)
Dept: ADMINISTRATIVE | Facility: OTHER | Age: 50
End: 2025-04-15
Payer: COMMERCIAL

## 2025-04-15 NOTE — PROGRESS NOTES
CHW - Case Closure    This Community Health Worker spoke to patient via telephone today.   Pt/Caregiver reported: Pt stated that he has not yet used th resources but he plans to. Pt denied needing any other assistance at this time  Pt/Caregiver denied any additional needs at this time and agrees with episode closure at this time.  Provided patient with Community Health Worker's contact information and encouraged him/her to contact this Community Health Worker if additional needs arise.

## 2025-04-17 ENCOUNTER — CLINICAL SUPPORT (OUTPATIENT)
Dept: SMOKING CESSATION | Facility: CLINIC | Age: 50
End: 2025-04-17
Payer: COMMERCIAL

## 2025-04-17 DIAGNOSIS — F17.200 NICOTINE DEPENDENCE: Primary | ICD-10-CM

## 2025-04-17 PROCEDURE — 99402 PREV MED CNSL INDIV APPRX 30: CPT | Mod: S$GLB,,, | Performed by: GENERAL PRACTICE

## 2025-04-17 PROCEDURE — 99999 PR PBB SHADOW E&M-EST. PATIENT-LVL I: CPT | Mod: PBBFAC,,,

## 2025-04-17 NOTE — PROGRESS NOTES
Individual Follow-Up Form    4/17/2025    Quit Date: TBD    Clinical Status of Patient: Outpatient    Length of Service: 30 minutes    Continuing Medication: yes  Nicotine gum    Other Medications: patches     Target Symptoms: Withdrawal and medication side effects. The following were rated moderate (3) to severe (4) on TCRS:  Moderate (3): none  Severe (4): none    Comments: Spoke to patient via telephonic visit in regards to tobacco cessation. Patient reports that he is continuing on NRT's of patches and gum and reports no negative side effects at this time. Patient also states that he has not used tobacco since Friday. Commended patient on efforts to become tobacco free. Patient sharing past weeks challenges. Patient advised on triggers/cures, strategies, behavior change, medication, and goals. The patient denies any abnormal behavioral or mental changes at this time.      Diagnosis: F17.200    Next Visit: 2 weeks

## 2025-04-21 ENCOUNTER — HOSPITAL ENCOUNTER (OUTPATIENT)
Dept: CARDIOLOGY | Facility: HOSPITAL | Age: 50
Discharge: HOME | End: 2025-04-21
Attending: INTERNAL MEDICINE
Payer: COMMERCIAL

## 2025-04-21 DIAGNOSIS — F17.200 NICOTINE DEPENDENCE: ICD-10-CM

## 2025-04-21 DIAGNOSIS — I48.92 PAROXYSMAL ATRIAL FLUTTER: ICD-10-CM

## 2025-04-21 PROCEDURE — 93225 XTRNL ECG REC<48 HRS REC: CPT

## 2025-04-21 PROCEDURE — 93227 XTRNL ECG REC<48 HR R&I: CPT | Mod: ,,, | Performed by: INTERNAL MEDICINE

## 2025-04-22 RX ORDER — MICONAZOLE NITRATE 2 %
2 CREAM (GRAM) TOPICAL
Qty: 100 EACH | Refills: 0 | Status: SHIPPED | OUTPATIENT
Start: 2025-04-22

## 2025-04-26 LAB
OHS CV EVENT MONITOR DAY: 0
OHS CV HOLTER LENGTH DECIMAL HOURS: 48
OHS CV HOLTER LENGTH HOURS: 48
OHS CV HOLTER LENGTH MINUTES: 0
OHS CV HOLTER SINUS AVERAGE HR: 64
OHS CV HOLTER SINUS MAX HR: 128
OHS CV HOLTER SINUS MIN HR: 43

## 2025-04-27 ENCOUNTER — RESULTS FOLLOW-UP (OUTPATIENT)
Dept: CARDIOLOGY | Facility: CLINIC | Age: 50
End: 2025-04-27

## 2025-04-29 ENCOUNTER — CLINICAL SUPPORT (OUTPATIENT)
Dept: SMOKING CESSATION | Facility: CLINIC | Age: 50
End: 2025-04-29
Payer: COMMERCIAL

## 2025-04-29 DIAGNOSIS — F17.200 NICOTINE DEPENDENCE: Primary | ICD-10-CM

## 2025-04-29 DIAGNOSIS — F17.200 NICOTINE DEPENDENCE: ICD-10-CM

## 2025-04-29 PROCEDURE — 99404 PREV MED CNSL INDIV APPRX 60: CPT | Mod: S$GLB,,, | Performed by: GENERAL PRACTICE

## 2025-04-29 RX ORDER — IBUPROFEN 200 MG
1 TABLET ORAL DAILY
Qty: 28 PATCH | Refills: 0 | Status: SHIPPED | OUTPATIENT
Start: 2025-04-29

## 2025-04-29 NOTE — PROGRESS NOTES
Individual Follow-Up Form    4/29/2025    Quit Date: TBD    Clinical Status of Patient: Outpatient    Length of Service: 60 minutes    Continuing Medication: yes  Nicotine gum    Other Medications: Patches     Target Symptoms: Withdrawal and medication side effects. The following were rated moderate (3) to severe (4) on TCRS:  Moderate (3): none  Severe (4): none    Comments: Patient seen today in regards to tobacco cessation. Completion of TCRS (Tobacco Cessation Rating Scale) Patient states that he remains tobacco free at this time, but is still working at quit. Commended patient on this. Patient is continuing on NRT's of patches and gum. Patient reports no negative side effects at this time. Patient sharing past weeks challenges. Patient states that he [place] and is thinking about how he will do. Patient advised on strategies and behavior change. The patient denies any abnormal behavioral or mental changes at this time.      Diagnosis: F17.200    Next Visit: 2 weeks

## 2025-05-01 DIAGNOSIS — I10 ESSENTIAL HYPERTENSION: ICD-10-CM

## 2025-05-01 RX ORDER — LOSARTAN POTASSIUM 25 MG/1
25 TABLET ORAL DAILY
Qty: 90 TABLET | Refills: 3 | Status: SHIPPED | OUTPATIENT
Start: 2025-05-01

## 2025-05-01 RX ORDER — HYDROCHLOROTHIAZIDE 25 MG/1
25 TABLET ORAL DAILY
Qty: 90 TABLET | Refills: 3 | Status: SHIPPED | OUTPATIENT
Start: 2025-05-01

## 2025-05-01 NOTE — TELEPHONE ENCOUNTER
No care due was identified.  [facility] Embedded Care Due Messages. Reference number: [id number].   5/01/2025 5:07:35 AM CDT

## 2025-05-15 RX ORDER — METOPROLOL SUCCINATE 50 MG/1
50 TABLET, EXTENDED RELEASE ORAL
Qty: 90 TABLET | Refills: 3 | OUTPATIENT
Start: 2025-05-15

## 2025-05-27 ENCOUNTER — TELEPHONE (OUTPATIENT)
Dept: CARDIOLOGY | Facility: CLINIC | Age: 50
End: 2025-05-27
Payer: COMMERCIAL

## 2025-05-27 NOTE — TELEPHONE ENCOUNTER
Spoke with patient. Advise patient to contact health insurance, give office a call to have echo stress test external.   Patient stated he will contact the office by the end of the week with update.   Patient verbalized understanding.     Eliza SPENCER

## 2025-05-29 ENCOUNTER — CLINICAL SUPPORT (OUTPATIENT)
Dept: SMOKING CESSATION | Facility: CLINIC | Age: 50
End: 2025-05-29
Payer: COMMERCIAL

## 2025-05-29 DIAGNOSIS — F17.200 NICOTINE DEPENDENCE: Primary | ICD-10-CM

## 2025-05-29 PROCEDURE — 99999 PR PBB SHADOW E&M-EST. PATIENT-LVL I: CPT | Mod: PBBFAC,,, | Performed by: GENERAL PRACTICE

## 2025-05-29 PROCEDURE — 99404 PREV MED CNSL INDIV APPRX 60: CPT | Mod: S$GLB,,, | Performed by: GENERAL PRACTICE

## 2025-05-29 RX ORDER — MICONAZOLE NITRATE 2 %
2 CREAM (GRAM) TOPICAL
Qty: 380 EACH | Refills: 0 | Status: SHIPPED | OUTPATIENT
Start: 2025-05-29

## 2025-05-29 NOTE — PROGRESS NOTES
Individual Follow-Up Form    5/29/2025    Quit Date: TBD    Clinical Status of Patient: Outpatient    Length of Service: 60 minutes    Continuing Medication: yes  Nicotine gum    Other Medications: Patches     Target Symptoms: Withdrawal and medication side effects. The following were rated moderate (3) to severe (4) on TCRS:  Moderate (3): none  Severe (4): none    Comments: Sharing last weeks challenges, triggers, and coping activities to remain quit, completion of TCRS (Tobacco Cessation Rating Scale) reviewed addiction model, personal reasons for quitting, medications, goals. Patient states that he remains tobacco free at this time. Patient states that he did have a slip, but was able to get back on track. Commended patient on this. Patient reports that he is continuing on NRT's of patches and gum and reports no negative side effects at this time. Patient states he finds that he has more benefit with utilizing the gum, but will continuing using the patches until he runs out. Patient advised on strategies and behavior change. The patient denies any abnormal behavioral or mental changes at this time.      Diagnosis: F17.200    Next Visit: 2 weeks

## 2025-06-02 ENCOUNTER — TELEPHONE (OUTPATIENT)
Dept: CARDIOLOGY | Facility: HOSPITAL | Age: 50
End: 2025-06-02
Payer: COMMERCIAL

## 2025-06-02 DIAGNOSIS — F17.200 NICOTINE DEPENDENCE: ICD-10-CM

## 2025-06-02 RX ORDER — IBUPROFEN 200 MG
1 TABLET ORAL DAILY
Qty: 28 PATCH | Refills: 3 | Status: SHIPPED | OUTPATIENT
Start: 2025-06-02

## 2025-06-16 ENCOUNTER — CLINICAL SUPPORT (OUTPATIENT)
Dept: SMOKING CESSATION | Facility: CLINIC | Age: 50
End: 2025-06-16
Payer: COMMERCIAL

## 2025-06-16 DIAGNOSIS — F17.200 NICOTINE DEPENDENCE: Primary | ICD-10-CM

## 2025-06-16 PROCEDURE — 99404 PREV MED CNSL INDIV APPRX 60: CPT | Mod: S$GLB,,, | Performed by: GENERAL PRACTICE

## 2025-06-20 NOTE — PROGRESS NOTES
Individual Follow-Up Form    6/20/2025    Quit Date: NA    Clinical Status of Patient: Outpatient    Length of Service: 60 minutes    Continuing Medication: yes  Nicotine gum    Other Medications: Patches     Target Symptoms: Withdrawal and medication side effects. The following were rated moderate (3) to severe (4) on TCRS:  Moderate (3): none  Severe (4): none    Comments: Spoke to patient via telephonic visit in regards to tobacco cessation. Patient states that he has another slip, but has gotten himself back on track. Patient sharing past weeks challenges. Patient states that he is continuing on NRT's of patches and gum. Patient reports no negative side effects at this time. Patient advised on strategies, behavior change, medication, goals, and quit date. The patient denies any abnormal behavioral or mental changes at this time.      Diagnosis: F17.200    Next Visit: 2 weeks

## 2025-06-23 ENCOUNTER — CLINICAL SUPPORT (OUTPATIENT)
Dept: SMOKING CESSATION | Facility: CLINIC | Age: 50
End: 2025-06-23
Payer: COMMERCIAL

## 2025-06-23 DIAGNOSIS — F17.200 NICOTINE DEPENDENCE: Primary | ICD-10-CM

## 2025-06-23 PROCEDURE — 99402 PREV MED CNSL INDIV APPRX 30: CPT | Mod: S$GLB,,, | Performed by: GENERAL PRACTICE

## 2025-06-23 PROCEDURE — 99999 PR PBB SHADOW E&M-EST. PATIENT-LVL I: CPT | Mod: PBBFAC,,, | Performed by: GENERAL PRACTICE

## 2025-06-26 ENCOUNTER — CLINICAL SUPPORT (OUTPATIENT)
Dept: SMOKING CESSATION | Facility: CLINIC | Age: 50
End: 2025-06-26
Payer: COMMERCIAL

## 2025-06-26 DIAGNOSIS — F17.200 NICOTINE DEPENDENCE: Primary | ICD-10-CM

## 2025-06-26 PROCEDURE — 99999 PR PBB SHADOW E&M-EST. PATIENT-LVL I: CPT | Mod: PBBFAC,,,

## 2025-06-26 NOTE — PROGRESS NOTES
Called pt to f/u on his 3 month smoking cessation quit status. Pt stated he is still smoking, but has cut back and is still in program. Informed him of benefit period, phone follow ups, and contact information. Will complete smart form and will continue to follow up on quit #3 episode.

## 2025-06-30 NOTE — PROGRESS NOTES
Individual Follow-Up Form    6/23/2025    Quit Date: TBD    Clinical Status of Patient: Outpatient    Length of Service: 30 minutes    Continuing Medication: yes  Nicotine gum    Other Medications: none     Target Symptoms: Withdrawal and medication side effects. The following were rated moderate (3) to severe (4) on TCRS:  Moderate (3): none  Severe (4): none    Comments: Spoke to patient via telephonic visit in regards to tobacco cessation. Patient states that he is not smoking everyday. Commended patient on this. Patient is continuing on Nicotine Gum and reports no negative side effects at this time. Patient sharing past weeks challenges. Patient advised on strategies, behavior change and medication. The patient denies any abnormal behavioral or mental changes at this time.      Diagnosis: F17.200    Next Visit: 2 weeks

## 2025-07-02 ENCOUNTER — HOSPITAL ENCOUNTER (OUTPATIENT)
Dept: CARDIOLOGY | Facility: HOSPITAL | Age: 50
Discharge: HOME | End: 2025-07-02
Attending: INTERNAL MEDICINE
Payer: COMMERCIAL

## 2025-07-02 ENCOUNTER — CLINICAL SUPPORT (OUTPATIENT)
Dept: SMOKING CESSATION | Facility: CLINIC | Age: 50
End: 2025-07-02
Payer: COMMERCIAL

## 2025-07-02 VITALS — HEIGHT: 73 IN | BODY MASS INDEX: 36.45 KG/M2 | WEIGHT: 275 LBS

## 2025-07-02 DIAGNOSIS — I48.92 PAROXYSMAL ATRIAL FLUTTER: ICD-10-CM

## 2025-07-02 DIAGNOSIS — F17.200 NICOTINE DEPENDENCE: Primary | ICD-10-CM

## 2025-07-02 LAB
AORTIC SIZE INDEX (SOV): 1.7 CM/M2
AORTIC SIZE INDEX: 1.4 CM/M2
ASCENDING AORTA: 3.5 CM
AV AREA BY CONTINUOUS VTI: 5 CM2
AV INDEX (PROSTH): 0.93
AV LVOT MEAN GRADIENT: 1 MMHG
AV LVOT PEAK GRADIENT: 2 MMHG
AV MEAN GRADIENT: 2 MMHG
AV PEAK GRADIENT: 3 MMHG
AV VALVE AREA BY VELOCITY RATIO: 4.1 CM²
AV VALVE AREA: 4.9 CM2
AV VELOCITY RATIO: 0.78
BSA FOR ECHO PROCEDURE: 2.53 M2
CV ECHO LV RWT: 0.29 CM
CV STRESS BASE HR: 81 BPM
DIASTOLIC BLOOD PRESSURE: 66 MMHG
DOP CALC AO PEAK VEL: 0.9 M/S
DOP CALC AO VTI: 16.2 CM
DOP CALC LVOT AREA: 5.3 CM2
DOP CALC LVOT DIAMETER: 2.6 CM
DOP CALC LVOT PEAK VEL: 0.7 M/S
DOP CALC LVOT STROKE VOLUME: 80.1 CM3
DOP CALCLVOT PEAK VEL VTI: 15.1 CM
E WAVE DECELERATION TIME: 208 MS
E/A RATIO: 1.52
E/E' RATIO: 7 M/S
ECHO EF ESTIMATED: 60 %
ECHO LV POSTERIOR WALL: 0.8 CM (ref 0.6–1.1)
FRACTIONAL SHORTENING: 32.7 % (ref 28–44)
INTERVENTRICULAR SEPTUM: 0.8 CM (ref 0.6–1.1)
LA MAJOR: 5.3 CM
LA MINOR: 5.4 CM
LA WIDTH: 4.5 CM
LEFT ATRIUM SIZE: 2.9 CM
LEFT ATRIUM VOLUME INDEX MOD: 34 ML/M2
LEFT ATRIUM VOLUME INDEX: 24 ML/M2
LEFT ATRIUM VOLUME MOD: 83 ML
LEFT ATRIUM VOLUME: 59 CM3
LEFT INTERNAL DIMENSION IN SYSTOLE: 3.7 CM (ref 2.1–4)
LEFT VENTRICLE DIASTOLIC VOLUME INDEX: 60.16 ML/M2
LEFT VENTRICLE DIASTOLIC VOLUME: 148 ML
LEFT VENTRICLE MASS INDEX: 65 G/M2
LEFT VENTRICLE SYSTOLIC VOLUME INDEX: 24 ML/M2
LEFT VENTRICLE SYSTOLIC VOLUME: 59 ML
LEFT VENTRICULAR INTERNAL DIMENSION IN DIASTOLE: 5.5 CM (ref 3.5–6)
LEFT VENTRICULAR MASS: 160 G
LV LATERAL E/E' RATIO: 6.3
LV SEPTAL E/E' RATIO: 7.6
MV PEAK A VEL: 0.5 M/S
MV PEAK E VEL: 0.76 M/S
OHS CV CPX 1 MINUTE RECOVERY HEART RATE: 111 BPM
OHS CV CPX 85 PERCENT MAX PREDICTED HEART RATE MALE: 144
OHS CV CPX ESTIMATED METS: 12
OHS CV CPX MAX PREDICTED HEART RATE: 169
OHS CV CPX PATIENT IS FEMALE: 0
OHS CV CPX PATIENT IS MALE: 1
OHS CV CPX PEAK DIASTOLIC BLOOD PRESSURE: 69 MMHG
OHS CV CPX PEAK HEAR RATE: 148 BPM
OHS CV CPX PEAK RATE PRESSURE PRODUCT: NORMAL
OHS CV CPX PEAK SYSTOLIC BLOOD PRESSURE: 156 MMHG
OHS CV CPX PERCENT MAX PREDICTED HEART RATE ACHIEVED: 88
OHS CV CPX RATE PRESSURE PRODUCT PRESENTING: 9477
OHS CV RV/LV RATIO: 0.84 CM
POST STRESS EJECTION FRACTION: 68 %
RA MAJOR: 5.56 CM
RA PRESSURE ESTIMATED: 3 MMHG
RA WIDTH: 4.3 CM
RIGHT ATRIAL AREA: 19.6 CM2
RIGHT VENTRICLE DIASTOLIC BASEL DIMENSION: 4.6 CM
RV TISSUE DOPPLER FREE WALL SYSTOLIC VELOCITY 1 (APICAL 4 CHAMBER VIEW): 12.15 CM/S
SINUS: 4.2 CM
STJ: 3.9 CM
STRESS ECHO POST EXERCISE DUR MIN: 7 MINUTES
STRESS ECHO POST EXERCISE DUR SEC: 25 SECONDS
SYSTOLIC BLOOD PRESSURE: 117 MMHG
TDI LATERAL: 0.12 M/S
TDI SEPTAL: 0.1 M/S
TDI: 0.11 M/S
TRICUSPID ANNULAR PLANE SYSTOLIC EXCURSION: 2.3 CM
Z-SCORE OF LEFT VENTRICULAR DIMENSION IN END DIASTOLE: -8.28
Z-SCORE OF LEFT VENTRICULAR DIMENSION IN END SYSTOLE: -5.58

## 2025-07-02 PROCEDURE — 93351 STRESS TTE COMPLETE: CPT

## 2025-07-02 PROCEDURE — 93351 STRESS TTE COMPLETE: CPT | Mod: 26,,, | Performed by: INTERNAL MEDICINE

## 2025-07-02 PROCEDURE — 93320 DOPPLER ECHO COMPLETE: CPT | Mod: 26,,, | Performed by: INTERNAL MEDICINE

## 2025-07-02 PROCEDURE — 99999 PR PBB SHADOW E&M-EST. PATIENT-LVL I: CPT | Mod: PBBFAC,,, | Performed by: GENERAL PRACTICE

## 2025-07-02 PROCEDURE — 99402 PREV MED CNSL INDIV APPRX 30: CPT | Mod: S$GLB,,, | Performed by: GENERAL PRACTICE

## 2025-07-02 PROCEDURE — 93325 DOPPLER ECHO COLOR FLOW MAPG: CPT | Mod: 26,,, | Performed by: INTERNAL MEDICINE

## 2025-07-02 RX ORDER — IBUPROFEN 200 MG
1 TABLET ORAL DAILY
Qty: 28 PATCH | Refills: 0 | Status: SHIPPED | OUTPATIENT
Start: 2025-07-02

## 2025-07-02 NOTE — PROGRESS NOTES
Individual Follow-Up Form    7/2/2025    Quit Date: TBD    Clinical Status of Patient: Outpatient    Length of Service: 30 minutes    Continuing Medication: yes  Nicotine gum    Other Medications: Patches     Target Symptoms: Withdrawal and medication side effects. The following were rated moderate (3) to severe (4) on TCRS:  Moderate (3): none  Severe (4): none    Comments: Spoke to patient via telephonic visit. Patient sharing past weeks challenges. Patient states that he had been continuing on the Nicotine Gum, but ran out due to only getting 100 pieces from the pharmacy. Patient advised on prescription amount and then called the pharmacy to get issue resolved. Patient to  remainder of gum. Patient advised on triggers/cues, strategies, and behavior change. The patient denies any abnormal behavioral or mental changes at this time.      Diagnosis: F17.200    Next Visit: 2 weeks

## 2025-07-28 ENCOUNTER — TELEPHONE (OUTPATIENT)
Dept: SMOKING CESSATION | Facility: CLINIC | Age: 50
End: 2025-07-28
Payer: COMMERCIAL

## 2025-07-30 ENCOUNTER — OFFICE VISIT (OUTPATIENT)
Dept: CARDIOLOGY | Facility: CLINIC | Age: 50
End: 2025-07-30
Payer: COMMERCIAL

## 2025-07-30 VITALS
HEART RATE: 73 BPM | HEIGHT: 73 IN | DIASTOLIC BLOOD PRESSURE: 78 MMHG | SYSTOLIC BLOOD PRESSURE: 128 MMHG | WEIGHT: 274.25 LBS | OXYGEN SATURATION: 98 % | BODY MASS INDEX: 36.35 KG/M2

## 2025-07-30 DIAGNOSIS — E66.01 SEVERE OBESITY WITH BODY MASS INDEX (BMI) OF 35.0 TO 39.9 WITH COMORBIDITY: ICD-10-CM

## 2025-07-30 DIAGNOSIS — I10 ESSENTIAL HYPERTENSION: ICD-10-CM

## 2025-07-30 DIAGNOSIS — I48.92 PAROXYSMAL ATRIAL FLUTTER: Primary | ICD-10-CM

## 2025-07-30 PROCEDURE — 3078F DIAST BP <80 MM HG: CPT | Mod: CPTII,S$GLB,, | Performed by: INTERNAL MEDICINE

## 2025-07-30 PROCEDURE — 3074F SYST BP LT 130 MM HG: CPT | Mod: CPTII,S$GLB,, | Performed by: INTERNAL MEDICINE

## 2025-07-30 PROCEDURE — 3008F BODY MASS INDEX DOCD: CPT | Mod: CPTII,S$GLB,, | Performed by: INTERNAL MEDICINE

## 2025-07-30 PROCEDURE — 4010F ACE/ARB THERAPY RXD/TAKEN: CPT | Mod: CPTII,S$GLB,, | Performed by: INTERNAL MEDICINE

## 2025-07-30 PROCEDURE — 99999 PR PBB SHADOW E&M-EST. PATIENT-LVL III: CPT | Mod: PBBFAC,,, | Performed by: INTERNAL MEDICINE

## 2025-07-30 PROCEDURE — 99214 OFFICE O/P EST MOD 30 MIN: CPT | Mod: S$GLB,,, | Performed by: INTERNAL MEDICINE

## 2025-07-30 PROCEDURE — 1159F MED LIST DOCD IN RCRD: CPT | Mod: CPTII,S$GLB,, | Performed by: INTERNAL MEDICINE

## 2025-07-30 RX ORDER — MULTIVITAMIN
1 TABLET ORAL DAILY
COMMUNITY

## 2025-07-30 NOTE — PROGRESS NOTES
Cardiology Clinic Visit    History of Present Illness:       Shane Savage is a pleasant 49 y.o. male with medical issues noted below in assessment/plan    7/30/25 7/30/25  - Reports previous palpitations episode 6 months ago: described as fluttering and racing; brief; has not recurred  - Stress test: normal results except for very mildly enlarged aorta  - Holter monitor review: no detection of AFib or other symptoms during monitoring period  - Experienced swelling of lower extremities a few days ago; has since improved  - Reports difficulty wearing compression socks due to leg size  - Denies anxiety, panic attacks, recurrence of palpitations since the episode 6 months ago    3/10/25  While cutting grass felt episode of heart racing. Self resolved but when he got home started feeling again and was on his way to the hospital via EMS. Stopped when he arrived to ED. Similar to previous afib episode.   Has had decreased appetitite and losing weight. Fatigue and exertional SOB. Does have hx of gastric bypass.  No BP measures at home, usually okay.   Still smoking.     History obtained by patient interview and supplemented by nursing documentation and chart review.   Objective   PMHx:  has a past medical history of Arthritis and Hypertension.   SurgHx:  has a past surgical history that includes gastic bypass.   FamHx: family history includes Diabetes in his mother; Hypertension in his father and mother.   SocialHx:  reports that he has been smoking cigarettes. He started smoking about 12 years ago. He has a 0.9 pack-year smoking history. He has quit using smokeless tobacco. He reports current alcohol use. He reports current drug use. Drug: Marijuana.  Home Meds:  Current Outpatient Medications   Medication Instructions    amoxicillin (AMOXIL) 500 MG Tab Take one tab by mouth every 12 hours till gone    ascorbic acid (vitamin C) (VITAMIN C) 1,000 mg, Daily    chlorhexidine (PERIDEX) 0.12 % solution Gently swish 15  ml in mouth for 30 seconds twice daily for two weeks. Start one day after procedure.    hydroCHLOROthiazide (HYDRODIURIL) 25 mg, Oral, Daily    HYDROcodone-acetaminophen (NORCO) 7.5-325 mg per tablet Take one tab by mouth every 6 to 8 hours as needed for pain.    ibuprofen (ADVIL,MOTRIN) 800 MG tablet Take one tablet by mouth every 6 hours as needed for pain    losartan (COZAAR) 25 mg, Oral, Daily    metoprolol succinate (TOPROL-XL) 50 mg, Oral     Objective/Exam:   There were no vitals taken for this visit.   Wt Readings from Last 4 Encounters:   07/02/25 124.7 kg (275 lb)   04/14/25 125.2 kg (276 lb)   03/21/25 124.2 kg (273 lb 13 oz)   03/12/25 119.3 kg (263 lb 0.1 oz)      Constitutional: NAD, Atraumatic, Conversant   HEENT: MMM, Sclera anicteric, No JVD   Cardiovascular: RRR, no murmurs noted, no chest tenderness to palpation, 2+ radial pulses b/l  Pulmonary: normal respiratory effort, CTAB, no crackles, wheezes  Abdominal: S/NT/ND  Musculoskeletal: No lower extremity edema noted b/l  Neurological: No gross neurological deficits  Skin: W/D/I  Psych: Appropriate affect, normal mood  Labs/Imaging/Procedures   Personally reviewed  Lab Results   Component Value Date     03/12/2025    K 4.5 03/12/2025     03/12/2025    CO2 28 03/12/2025    BUN 9 03/12/2025    CREATININE 1.0 03/12/2025    ANIONGAP 9 03/12/2025     Lab Results   Component Value Date    HGBA1C 5.4 09/09/2024     Lab Results   Component Value Date    BNP 51 10/11/2024    BNP 30 10/25/2023    BNP 33 08/27/2019      Lab Results   Component Value Date    WBC 4.73 03/12/2025    HGB 14.5 03/12/2025    HCT 44.2 03/12/2025     03/12/2025    GRAN 2.1 03/12/2025    GRAN 44.2 03/12/2025     Lab Results   Component Value Date    CHOL 105 (L) 09/09/2024    HDL 48 09/09/2024    LDLCALC 45.2 (L) 09/09/2024    LDLCALC 58.2 (L) 01/24/2023    LDLCALC 72.4 04/19/2022    TRIG 59 09/09/2024     Lab Results   Component Value Date    TSH 0.838 09/09/2024  "    No results found for: "APOLIPOPROTE"  No results found for: "LIPOA"       TTE:  Results for orders placed during the hospital encounter of 12/26/23    Echo Saline Bubble? No    Interpretation Summary    Left Ventricle: The left ventricle is normal in size. Normal wall thickness. Normal wall motion. There is normal systolic function with a visually estimated ejection fraction of 60 - 65%. There is normal diastolic function.    Right Ventricle: Mild right ventricular enlargement. Wall thickness is normal. Right ventricle wall motion  is normal. Systolic function is normal.    The left atrium is mildly dilated.    Pulmonary Artery: The estimated pulmonary artery systolic pressure is 21 mmHg.    IVC/SVC: Normal venous pressure at 3 mmHg.    Stress Testing:   No results found for this or any previous visit.     Coronary Angiogram:  No results found for this or any previous visit.      Personal: Used to work at [employer] then closed. No works as  for [employer]. Father is      Time spent on this visit included personally:  -Reviewing Medical records & lab results  -Independently reviewing and interpreting (if not documented by myself) EKGs, echocardiograms, catherizations   -Obtaining a history, performing a relevant exam, counseling/educating the patient/family  -Documenting clinical information in the EMR including ordering of tests  -Care coordination and communicating with other health care providers       Problem List:     1. Paroxysmal atrial flutter    2. Essential hypertension    3. Severe obesity with body mass index (BMI) of 35.0 to 39.9 with comorbidity      Assessment/Plan:   Shane Savage is a 49 y.o. male with HTN, early family hx of CAD (father MI 50s) current smoker, parox aflutter not on AC currenlty w/ recent ED visit for palpitations suspected flutter which self terminated.  ECG sinus Kirt otherwise normal. Stress echo  60%, mild Ao Root 4.2, negative " ischemia. Holter negative.  BP well controlled today.  No further symptoms since I last saw him.    -continue home bp measurement   -cont XL 50  -on losartan 25 and hydrochlorothiazide 12.5 for blood pressure management, discuss combining therapies in [facility]--just had refills he will re-evaluate in a few months  -as no reoccurrence of symptoms will continue to monitor for now, low threshold to start potential 1 C agents if symptoms reoccur as well as anticoagulation  -TTE every 1-2 years for aortic root monitoring  Continue remainder of Current Medications[1]         Thank you for the opportunity to care for this patient. Please don't hesitate to reach out with any questions/concerns.   Speech recognition software used for parts of this note. Please excuse grammar, out of context phrases, and/or syntax issues.        Kevan Torres MD  Interventional Cardiology  [facility]               [1]   Current Outpatient Medications:     amoxicillin (AMOXIL) 500 MG Tab, Take one tab by mouth every 12 hours till gone, Disp: 14 tablet, Rfl: 0    ascorbic acid, vitamin C, (VITAMIN C) 1000 MG tablet, Take 1,000 mg by mouth once daily., Disp: , Rfl:     chlorhexidine (PERIDEX) 0.12 % solution, Gently swish 15 ml in mouth for 30 seconds twice daily for two weeks. Start one day after procedure., Disp: 473 mL, Rfl: 0    hydroCHLOROthiazide (HYDRODIURIL) 25 MG tablet, Take 1 tablet (25 mg total) by mouth once daily., Disp: 90 tablet, Rfl: 3    HYDROcodone-acetaminophen (NORCO) 7.5-325 mg per tablet, Take one tab by mouth every 6 to 8 hours as needed for pain., Disp: 8 tablet, Rfl: 0    ibuprofen (ADVIL,MOTRIN) 800 MG tablet, Take one tablet by mouth every 6 hours as needed for pain, Disp: 28 tablet, Rfl: 0    losartan (COZAAR) 25 MG tablet, Take 1 tablet (25 mg total) by mouth once daily., Disp: 90 tablet, Rfl: 3    metoprolol succinate (TOPROL-XL) 50 MG 24 hr tablet, Take 1 tablet by mouth once daily, Disp: 90 tablet, Rfl:  3

## 2025-07-31 ENCOUNTER — TELEPHONE (OUTPATIENT)
Dept: SMOKING CESSATION | Facility: CLINIC | Age: 50
End: 2025-07-31
Payer: COMMERCIAL

## 2025-08-12 ENCOUNTER — CLINICAL SUPPORT (OUTPATIENT)
Dept: SMOKING CESSATION | Facility: CLINIC | Age: 50
End: 2025-08-12
Payer: COMMERCIAL

## 2025-08-12 DIAGNOSIS — F17.200 NICOTINE DEPENDENCE: ICD-10-CM

## 2025-08-12 PROCEDURE — 99404 PREV MED CNSL INDIV APPRX 60: CPT | Mod: S$GLB,,, | Performed by: GENERAL PRACTICE

## 2025-08-12 PROCEDURE — 99999 PR PBB SHADOW E&M-EST. PATIENT-LVL II: CPT | Mod: PBBFAC,,, | Performed by: GENERAL PRACTICE

## 2025-08-12 RX ORDER — IBUPROFEN 200 MG
1 TABLET ORAL DAILY
Qty: 28 PATCH | Refills: 0 | Status: SHIPPED | OUTPATIENT
Start: 2025-08-12

## 2025-08-22 ENCOUNTER — OFFICE VISIT (OUTPATIENT)
Dept: FAMILY MEDICINE | Facility: CLINIC | Age: 50
End: 2025-08-22
Payer: COMMERCIAL

## 2025-08-22 VITALS
HEIGHT: 73 IN | DIASTOLIC BLOOD PRESSURE: 88 MMHG | SYSTOLIC BLOOD PRESSURE: 142 MMHG | OXYGEN SATURATION: 98 % | HEART RATE: 65 BPM | WEIGHT: 285.19 LBS | BODY MASS INDEX: 37.8 KG/M2

## 2025-08-22 DIAGNOSIS — M54.42 CHRONIC LEFT-SIDED LOW BACK PAIN WITH LEFT-SIDED SCIATICA: ICD-10-CM

## 2025-08-22 DIAGNOSIS — I10 ESSENTIAL HYPERTENSION: ICD-10-CM

## 2025-08-22 DIAGNOSIS — E66.01 SEVERE OBESITY WITH BODY MASS INDEX (BMI) OF 35.0 TO 39.9 WITH COMORBIDITY: ICD-10-CM

## 2025-08-22 DIAGNOSIS — M79.642 CHRONIC PAIN OF LEFT HAND: Primary | ICD-10-CM

## 2025-08-22 DIAGNOSIS — G89.29 CHRONIC LEFT-SIDED LOW BACK PAIN WITH LEFT-SIDED SCIATICA: ICD-10-CM

## 2025-08-22 DIAGNOSIS — G89.29 CHRONIC PAIN OF LEFT HAND: Primary | ICD-10-CM

## 2025-08-22 PROCEDURE — 3079F DIAST BP 80-89 MM HG: CPT | Mod: CPTII,S$GLB,, | Performed by: NURSE PRACTITIONER

## 2025-08-22 PROCEDURE — 99999 PR PBB SHADOW E&M-EST. PATIENT-LVL V: CPT | Mod: PBBFAC,,, | Performed by: NURSE PRACTITIONER

## 2025-08-22 PROCEDURE — 3008F BODY MASS INDEX DOCD: CPT | Mod: CPTII,S$GLB,, | Performed by: NURSE PRACTITIONER

## 2025-08-22 PROCEDURE — 3075F SYST BP GE 130 - 139MM HG: CPT | Mod: CPTII,S$GLB,, | Performed by: NURSE PRACTITIONER

## 2025-08-22 PROCEDURE — 1159F MED LIST DOCD IN RCRD: CPT | Mod: CPTII,S$GLB,, | Performed by: NURSE PRACTITIONER

## 2025-08-22 PROCEDURE — 99214 OFFICE O/P EST MOD 30 MIN: CPT | Mod: S$GLB,,, | Performed by: NURSE PRACTITIONER

## 2025-08-22 PROCEDURE — 1160F RVW MEDS BY RX/DR IN RCRD: CPT | Mod: CPTII,S$GLB,, | Performed by: NURSE PRACTITIONER

## 2025-08-22 PROCEDURE — 4010F ACE/ARB THERAPY RXD/TAKEN: CPT | Mod: CPTII,S$GLB,, | Performed by: NURSE PRACTITIONER

## 2025-09-02 ENCOUNTER — CLINICAL SUPPORT (OUTPATIENT)
Dept: SMOKING CESSATION | Facility: CLINIC | Age: 50
End: 2025-09-02
Payer: COMMERCIAL

## 2025-09-02 DIAGNOSIS — F17.200 NICOTINE DEPENDENCE: Primary | ICD-10-CM

## 2025-09-02 PROCEDURE — 99999 PR PBB SHADOW E&M-EST. PATIENT-LVL I: CPT | Mod: PBBFAC,,, | Performed by: GENERAL PRACTICE

## 2025-09-02 PROCEDURE — 99404 PREV MED CNSL INDIV APPRX 60: CPT | Mod: S$GLB,,, | Performed by: GENERAL PRACTICE

## 2025-09-02 RX ORDER — DM/P-EPHED/ACETAMINOPH/DOXYLAM 30-7.5/3
2 LIQUID (ML) ORAL
Qty: 270 LOZENGE | Refills: 0 | Status: SHIPPED | OUTPATIENT
Start: 2025-09-02